# Patient Record
Sex: FEMALE | Race: WHITE | Employment: UNEMPLOYED | URBAN - METROPOLITAN AREA
[De-identification: names, ages, dates, MRNs, and addresses within clinical notes are randomized per-mention and may not be internally consistent; named-entity substitution may affect disease eponyms.]

---

## 2017-03-10 ENCOUNTER — OFFICE VISIT (OUTPATIENT)
Dept: FAMILY MEDICINE CLINIC | Age: 47
End: 2017-03-10

## 2017-03-10 VITALS
WEIGHT: 145 LBS | SYSTOLIC BLOOD PRESSURE: 127 MMHG | TEMPERATURE: 98 F | HEIGHT: 68 IN | BODY MASS INDEX: 21.98 KG/M2 | DIASTOLIC BLOOD PRESSURE: 85 MMHG | RESPIRATION RATE: 10 BRPM | HEART RATE: 74 BPM

## 2017-03-10 DIAGNOSIS — K90.0 CELIAC DISEASE: ICD-10-CM

## 2017-03-10 DIAGNOSIS — E03.4 HYPOTHYROIDISM DUE TO ACQUIRED ATROPHY OF THYROID: ICD-10-CM

## 2017-03-10 DIAGNOSIS — Z00.00 PREVENTATIVE HEALTH CARE: Primary | ICD-10-CM

## 2017-03-10 PROCEDURE — 99396 PREV VISIT EST AGE 40-64: CPT | Performed by: FAMILY MEDICINE

## 2017-03-10 RX ORDER — TAZAROTENE 0.1 MG/G
CREAM CUTANEOUS
COMMUNITY
Start: 2016-12-14

## 2017-04-03 LAB
CHOLESTEROL, TOTAL: 195 MG/DL
CHOLESTEROL/HDL RATIO: ABNORMAL
HBA1C MFR BLD: 5.1 %
HDLC SERPL-MCNC: 89 MG/DL (ref 35–70)
LDL CHOLESTEROL CALCULATED: 93 MG/DL (ref 0–160)
TRIGL SERPL-MCNC: 67 MG/DL
VLDLC SERPL CALC-MCNC: ABNORMAL MG/DL

## 2017-07-25 ENCOUNTER — OFFICE VISIT (OUTPATIENT)
Dept: FAMILY MEDICINE CLINIC | Age: 47
End: 2017-07-25
Payer: COMMERCIAL

## 2017-07-25 VITALS
TEMPERATURE: 97.3 F | HEIGHT: 69 IN | SYSTOLIC BLOOD PRESSURE: 115 MMHG | BODY MASS INDEX: 22.01 KG/M2 | DIASTOLIC BLOOD PRESSURE: 72 MMHG | RESPIRATION RATE: 16 BRPM | HEART RATE: 67 BPM | WEIGHT: 148.6 LBS

## 2017-07-25 DIAGNOSIS — K90.0 CELIAC DISEASE: ICD-10-CM

## 2017-07-25 DIAGNOSIS — E03.4 HYPOTHYROIDISM DUE TO ACQUIRED ATROPHY OF THYROID: ICD-10-CM

## 2017-07-25 DIAGNOSIS — Z00.00 PREVENTATIVE HEALTH CARE: ICD-10-CM

## 2017-07-25 PROCEDURE — 99214 OFFICE O/P EST MOD 30 MIN: CPT | Performed by: FAMILY MEDICINE

## 2017-07-25 RX ORDER — ASCORBIC ACID 125 MG
1 TABLET,CHEWABLE ORAL 2 TIMES DAILY
COMMUNITY

## 2017-07-25 RX ORDER — MULTIVIT WITH MINERALS/LUTEIN
1000 TABLET ORAL 4 TIMES DAILY
COMMUNITY

## 2017-07-25 ASSESSMENT — PATIENT HEALTH QUESTIONNAIRE - PHQ9
2. FEELING DOWN, DEPRESSED OR HOPELESS: 0
SUM OF ALL RESPONSES TO PHQ QUESTIONS 1-9: 0
1. LITTLE INTEREST OR PLEASURE IN DOING THINGS: 0
SUM OF ALL RESPONSES TO PHQ9 QUESTIONS 1 & 2: 0

## 2018-01-21 LAB
AVERAGE GLUCOSE: NORMAL
CHOLESTEROL, TOTAL: 202 MG/DL
CHOLESTEROL/HDL RATIO: ABNORMAL
HBA1C MFR BLD: 5.1 %
HDLC SERPL-MCNC: 85 MG/DL (ref 35–70)
LDL CHOLESTEROL CALCULATED: 103 MG/DL (ref 0–160)
TRIGL SERPL-MCNC: 71 MG/DL
VLDLC SERPL CALC-MCNC: ABNORMAL MG/DL

## 2018-02-02 ENCOUNTER — OFFICE VISIT (OUTPATIENT)
Dept: FAMILY MEDICINE CLINIC | Age: 48
End: 2018-02-02
Payer: COMMERCIAL

## 2018-02-02 VITALS
DIASTOLIC BLOOD PRESSURE: 76 MMHG | OXYGEN SATURATION: 92 % | SYSTOLIC BLOOD PRESSURE: 115 MMHG | TEMPERATURE: 97.5 F | BODY MASS INDEX: 20.88 KG/M2 | RESPIRATION RATE: 10 BRPM | HEIGHT: 69 IN | WEIGHT: 141 LBS | HEART RATE: 73 BPM

## 2018-02-02 DIAGNOSIS — K90.0 CELIAC DISEASE: ICD-10-CM

## 2018-02-02 DIAGNOSIS — Z00.00 PREVENTATIVE HEALTH CARE: ICD-10-CM

## 2018-02-02 DIAGNOSIS — E78.9 LIPID DISORDER: Primary | ICD-10-CM

## 2018-02-02 DIAGNOSIS — E06.3 HYPOTHYROIDISM DUE TO HASHIMOTO'S THYROIDITIS: ICD-10-CM

## 2018-02-02 DIAGNOSIS — E03.8 HYPOTHYROIDISM DUE TO HASHIMOTO'S THYROIDITIS: ICD-10-CM

## 2018-02-02 PROCEDURE — G8420 CALC BMI NORM PARAMETERS: HCPCS | Performed by: FAMILY MEDICINE

## 2018-02-02 PROCEDURE — G8427 DOCREV CUR MEDS BY ELIG CLIN: HCPCS | Performed by: FAMILY MEDICINE

## 2018-02-02 PROCEDURE — G8484 FLU IMMUNIZE NO ADMIN: HCPCS | Performed by: FAMILY MEDICINE

## 2018-02-02 PROCEDURE — 1036F TOBACCO NON-USER: CPT | Performed by: FAMILY MEDICINE

## 2018-02-02 PROCEDURE — 99214 OFFICE O/P EST MOD 30 MIN: CPT | Performed by: FAMILY MEDICINE

## 2018-02-02 NOTE — PROGRESS NOTES
Subjective:      Patient ID: Blanche Snowden is a 56 y.o. female. HPI   Blanche Snowdne is a 56 y.o. White female. Kristy Dandy  presents to the [address] [facility] today for   Chief Complaint   Patient presents with   [address]   ,  and;   Lipid disorder    Celiac disease    Hypothyroidism due to Hashimoto's thyroiditis    Preventative health care      I have reviewed Blanche Snowden medical, surgical and other pertinent history in detail, and have updated medication and allergy information in the computerized patient record. Clinical Care Team:     -Referring Provider for today's consult: Self Referred  -Primary Care Provider: Nate Lorenzo MD    Medical/Surgical History:   She  has a past medical history of Celiac disease and Hypothyroidism. Her  has a past surgical history that includes Upper gastrointestinal endoscopy (November 2013); Colonoscopy (July 2013); Appendectomy (2000); Anus surgery (2000); and other surgical history. Family/Social History:     Her family history includes High Cholesterol in her father. She  reports that she has never smoked. She has never used smokeless tobacco. She reports that she does not drink alcohol or use drugs.     Medications/Allergies/Immunizations:     Her current medication(s) include   Current Outpatient Prescriptions:     LCVBHQUEF-GEFIPSEPJ-QHRPDNGCJI PO, Take 1 tablet by mouth every morning (before breakfast) [facility], Disp: , Rfl:     Lysine 500 MG TABS, Take 500 mg by mouth 4 times daily, Disp: , Rfl:     Ascorbic Acid (VITAMIN C) 1000 MG tablet, Take 1,000 mg by mouth 2 times daily, Disp: , Rfl:     Menaquinone-7 (VITAMIN K2) 100 MCG CAPS, Take 1 capsule by mouth 2 times daily, Disp: , Rfl:     TAZORAC 0.1 % cream, Apply as needed nightly, Disp: , Rfl:     NONFORMULARY, Progesterone compounded capsule- 1 capsule daily on last half of cycle, Disp: , Rfl:     Specialty Vitamins Products (MAGNESIUM, AMINO ACID CHELATE,) 133 MG tablet, Take 1 tablet by mouth 4 times daily. , Disp: , Rfl:     ferrous sulfate 325 (65 FE) MG tablet, Take 325 mg by mouth nightly., Disp: , Rfl:     Omega-3 1400 MG CAPS, Take 1,250 mg by mouth 6 times daily [facility]S  Or NOW1 before and as finish meals and at bedtime, Disp: , Rfl:     Cinnamon 500 MG CAPS, Take 1 capsule by mouth 4 times daily (before meals and nightly). , Disp: , Rfl:     Vitamin D (CHOLECALCIFEROL) 1000 UNITS CAPS capsule, Take 6,000 Units by mouth daily , Disp: , Rfl:     Magnesium Citrate 100 MG TABS, Take 1 tablet by mouth 3 times daily (with meals) , Disp: , Rfl:     THYROID PO, Compound Thyroid 40 mcg/12 mcg Take 1 capsule by mouth daily, Disp: , Rfl:     Multiple Vitamins-Minerals (THERAPEUTIC MULTIVITAMIN-MINERALS) tablet, Take 1 tablet by mouth 2 times daily. , Disp: , Rfl:     DHEA 10 MG TABS, Take 15 mg by mouth daily Alternate 10 mg and 20 mg, Disp: , Rfl:     QUERCETIN PO, Take 1 capsule by mouth daily as needed (allergies). , Disp: , Rfl:     B Complex Vitamins (VITAMIN B COMPLEX) TABS, Take 1 tablet by mouth 3 times daily (before meals) [facility], [facility]vs or natures made, Disp: , Rfl:   Allergies: Review of patient's allergies indicates no known allergies. ,  Immunizations: There is no immunization history on file for this patient. History of Present Illness:     Irina's had concerns including Discuss Labs. Juan R Spivey  presents to the [facility] [address] [place] today for;   Chief Complaint   Patient presents with   [address] [place] [address]   , ,  abnormal labs follow up and these conditions as she  Is looking today for:     Lipid disorder    Celiac disease    Hypothyroidism due to Hashimoto's thyroiditis    Preventative health care          Review of Systems   All other systems reviewed and are negative. Objective:   Physical Exam   Constitutional: She is oriented to person, place, and time. She appears well-developed and well-nourished.    HENT:   Head: Normocephalic. Pulmonary/Chest: Effort normal.   Neurological: She is alert and oriented to person, place, and time. Psychiatric: She has a normal mood and affect. Thought content normal.   Nursing note and vitals reviewed. Assessment:     Laboratory Data:   Lab results were searched in Care Everywhere and/or those brought by the pateint were reviewed today with Alma Hernandez and she has a copy of their most recent labs to take home with them as noted below;       Imaging Data:   Imaging Data:       Assessment & Plan:       Impression:  1. Lipid disorder    2. Celiac disease    3. Hypothyroidism due to Hashimoto's thyroiditis    4. Preventative health care      Assessment and Plan:  After reviewing the patients chief complaints, reviewing their lab findings in great detail (with the patient and those accompanying them) which correlate to their chief complaints, symptoms, and or medical conditions; suggestions were made relating to changes in diet and or supplements which may improve the complaints and which will be reflected in their future lab findings; Chief Complaint   Patient presents with   [address]   ;    Plans for the next visits:  - Abnormal and non-optimal Labs were ordered today to be repeated in the next 120-365 days to assess changes from adjustments in nutrition and or nutrients. - Patient instructed when having a blood draw to ask the  to divide their lab draws into multiple draws over several days if not feeling good at the time of the lab draw or if either prefers to do several smaller blood draws over several days  - Patient instructed to check with insurer before each lab draw and to go to the lab which the insurer directs them for the most cost effective lab draw with the least patient's cost  - Alma Hernandez  will be scheduled subsequent to those results.   Jay Dawn will bring in her drink and food log to her next visit    Chronic Problems Addressed on this Visit: or weight issues. I discussed that the patient's clinical use of cinnamon bark, calcium, magnesium, Vitamin D and pharmaceutical grade CVS #[id number] fish oil or triple-strength fish oil, and balanced B-50 or B-100 time-released B complex which does not contain Vit C in the tablet. The dose will be for a time-limited trial to determine their individual effectiveness and tolerance in this patient. I also referred the patient to the reviewing such items as consumerlabs. com NMCD: Nutrition, Metabolism, and Cardiovascular Diseases (journal) and NCAAM.gov,  Searching www.nih.gov for any concerns about long-term use and hepatotoxicity of cinnamon and other nutrients and suggest they frequently search [employer].gov for the latest non-proprietary information on nutriceuticals as well as consider a subscription to [employer] for details on reviewed supplements, or at the least review the nutrient files at [facility], [address], an insulin mimetic, reduces some High Carbohydrate Dietary Impacts. Methylhydroxychalcone polymers insulin-enhancing properties in fat cells are responsible for enhanced glucose uptake, inhibiting hepatic HMG-CoA reductase and lowers lipids. www.jacn. org/content/20/4/327.full     But cinnamon with additives such as Cinnamon Extract are not effective as insulin mimetics. https://www.arroyo.net/     Nutrients for Start up from [employer] or [employer] for ease to get started now ;  Keyur Desai has some useable products;  - Triple Strength Fish Oil, enteric coated  - Vit D 3 5000 IU gel caps  - Iron ferrous sulfat 325 mg tabs  - Centrum Silver look-a-like for most patients not needing iron, or  - Centrum plain look-a-like if need iron    Local pharmacy chains such as [facility], [address], [facility], [address].  Beatris [place];  - Triple Strength Fish Oil (enteric coated if available) or    If not enteric coated, can take from freezer for less burps  - B-50 or B-100 time released balanced B complex tabs not containing Vit C in tablet  - Cinnamon bark 500 mg (with Chromium but not extracts)   some brands list 1000 mg / serving of 2 500 mg capsules,    some brands have 1000 mg caps with the chromium but capsule size is huge  - Calcium carbonate/citrate, magnesium oxide/citrate, Vit D 3  as 3-4 tabs/caps/serving     Some Local Brands may contain Zinc which is acceptable for the first bottle or two  - Magnesium oxide 250 mg tabs for those having < 2 bowel movements daily  - Magnesium citrate TABLETS  or Slow Mag, or magnesium gluconate if having > 2 bowel movement/day  - Centrum Silver or look-a-like for most patients, Centrum plain or look-a-like with iron  - Vitamin D-3 comes as 1,000 IU or 2,000 IU or 5,000 IU gel caps or Liquid drops      Some brands containing or derived from soy oil or corn oil are OK if not allergic to soy  - Elemental Iron 65 mg tabs at bedtime is available over the counter if need more iron     Usually turns bowel movements grey, green or black but not a concern  - Apricot Kernel Oil (by Now) for dry skin and use in sensitive perineal area skin    Nutrients for ongoing use by Mail order for less expense from www.amazon. com, wwwWealthVisor.com, www.[employer] Logic   - Triple Strength Fish Oil , Softgels   - B-100 time released balanced B complex   - Cinnamon bark 500 mg with or without Chromium but not extract (ineffective)  - Calcium carbonate 1000 mg, Magnesium oxide 500 mg, Vit D 3 best as individual  - Magnesium oxide 250 mg, 400 mg, or 500 mg tabs if < 2 bowel movements daily  - Multivitamin Seniors for most patients, One Daily or Item with iron  - Vit D 3  1,000IU ,   2,000 IU,  5,000 IU, can start low and buy larger on 2nd bottle     Some brands containing or derived from soy oil or corn oil are OK if not allergic to soy    Nutrients for Special Needs by Mail order for less expense;  - Elemental Iron 65 mg tabs rash on your chin, cheeks and lines on your neck and chest. The amount of latex is different in each food product or fruit variety. Foods to Avoid out of Season if not grown locally: Melon, Nectarine, Papaya, Cherry, Passion fruit, Plum, Chestnuts, and Tomato. Avocado, Banana, Celery, Figs, and Kiwi always contain Latex-like protein and are almost universally toxic    Whats in Season? Strawberries taste better in June than December because June is strawberry season so buy locally grown produce \"in season\" for the best flavor, cost and less Latex. Locally grown produce not only tastes great requires little of no ethylene exposure in food distribution so has less latex content. Out of season, use canned, frozen or dried since processed ripe and are latex lower!!!   Month     [place] Locally Grown Produce  January, February, March: use canned, frozen or dried fruits since lower in latex  April; asparagus, radishes  May; asparagus, broccoli, green onions, greens, peas, radishes, rhubarb  June; asparagus, beets, beans, broccoli, cabbage, cantaloupe, carrots, green onions, greens, lettuce, onions, parsley, peas, radishes, rhubarb, strawberries, watermelons  July; beans, beets, blueberries, broccoli, cabbage, cantaloupe, carrots, cauliflower, celery, cucumbers, eggplant, grapes, green onions, greens, lettuce, onions, parsley, peas, peaches, bell peppers, potatoes, radishes, summer raspberries, squash, sweet corn, tomatoes, turnips, watermelons  August; apples, beans, beets, blueberries, cabbage, cantaloupe, carrots, cauliflower, celery, cucumbers, eggplant, grapes, green onions, greens, lettuce, onions, parsley, peas, peaches, pears, bell peppers, potatoes, radishes, squash, sweet corn, tomatoes, turnips, watermelons  September; apples, beans, beets, blueberries, cabbage, cantaloupe, carrots, cauliflower, celery, cucumbers, eggplant, grapes, green onions, greens, lettuce, onions, parsley, peas, peaches, pears, bell

## 2018-04-23 ENCOUNTER — TELEPHONE (OUTPATIENT)
Dept: FAMILY MEDICINE CLINIC | Age: 48
End: 2018-04-23

## 2019-01-15 LAB
AVERAGE GLUCOSE: NORMAL
CHOLESTEROL, TOTAL: 172 MG/DL
CHOLESTEROL/HDL RATIO: NORMAL
HBA1C MFR BLD: 5.3 %
HDLC SERPL-MCNC: 57 MG/DL (ref 35–70)
LDL CHOLESTEROL CALCULATED: 95 MG/DL (ref 0–160)
TRIGL SERPL-MCNC: 97 MG/DL
VLDLC SERPL CALC-MCNC: NORMAL MG/DL

## 2019-02-01 ENCOUNTER — OFFICE VISIT (OUTPATIENT)
Dept: FAMILY MEDICINE CLINIC | Age: 49
End: 2019-02-01
Payer: COMMERCIAL

## 2019-02-01 VITALS
SYSTOLIC BLOOD PRESSURE: 94 MMHG | DIASTOLIC BLOOD PRESSURE: 60 MMHG | HEIGHT: 69 IN | TEMPERATURE: 98.4 F | BODY MASS INDEX: 22.1 KG/M2 | WEIGHT: 149.2 LBS | RESPIRATION RATE: 10 BRPM

## 2019-02-01 DIAGNOSIS — E03.8 HYPOTHYROIDISM DUE TO HASHIMOTO'S THYROIDITIS: ICD-10-CM

## 2019-02-01 DIAGNOSIS — K90.0 CELIAC DISEASE: ICD-10-CM

## 2019-02-01 DIAGNOSIS — E78.9 LIPID DISORDER: ICD-10-CM

## 2019-02-01 DIAGNOSIS — E06.3 HYPOTHYROIDISM DUE TO HASHIMOTO'S THYROIDITIS: ICD-10-CM

## 2019-02-01 PROCEDURE — G8427 DOCREV CUR MEDS BY ELIG CLIN: HCPCS | Performed by: FAMILY MEDICINE

## 2019-02-01 PROCEDURE — G8420 CALC BMI NORM PARAMETERS: HCPCS | Performed by: FAMILY MEDICINE

## 2019-02-01 PROCEDURE — 1036F TOBACCO NON-USER: CPT | Performed by: FAMILY MEDICINE

## 2019-02-01 PROCEDURE — 99214 OFFICE O/P EST MOD 30 MIN: CPT | Performed by: FAMILY MEDICINE

## 2019-02-01 PROCEDURE — G8484 FLU IMMUNIZE NO ADMIN: HCPCS | Performed by: FAMILY MEDICINE

## 2019-02-01 ASSESSMENT — PATIENT HEALTH QUESTIONNAIRE - PHQ9
2. FEELING DOWN, DEPRESSED OR HOPELESS: 0
SUM OF ALL RESPONSES TO PHQ QUESTIONS 1-9: 0
1. LITTLE INTEREST OR PLEASURE IN DOING THINGS: 0
SUM OF ALL RESPONSES TO PHQ9 QUESTIONS 1 & 2: 0
SUM OF ALL RESPONSES TO PHQ QUESTIONS 1-9: 0

## 2019-08-20 ENCOUNTER — NURSE ONLY (OUTPATIENT)
Dept: LAB | Age: 49
End: 2019-08-20

## 2019-08-20 DIAGNOSIS — E78.9 LIPID DISORDER: ICD-10-CM

## 2019-08-20 DIAGNOSIS — K90.0 CELIAC DISEASE: ICD-10-CM

## 2019-08-20 DIAGNOSIS — E03.8 HYPOTHYROIDISM DUE TO HASHIMOTO'S THYROIDITIS: ICD-10-CM

## 2019-08-20 DIAGNOSIS — E06.3 HYPOTHYROIDISM DUE TO HASHIMOTO'S THYROIDITIS: ICD-10-CM

## 2019-08-20 LAB
AVERAGE GLUCOSE: 93 MG/DL (ref 70–126)
BASOPHILS # BLD: 0.5 %
BASOPHILS ABSOLUTE: 0 THOU/MM3 (ref 0–0.1)
CALCIUM SERPL-MCNC: 9.6 MG/DL (ref 8.5–10.5)
CHOLESTEROL, TOTAL: 172 MG/DL (ref 100–199)
EOSINOPHIL # BLD: 0.8 %
EOSINOPHILS ABSOLUTE: 0.1 THOU/MM3 (ref 0–0.4)
ERYTHROCYTE [DISTWIDTH] IN BLOOD BY AUTOMATED COUNT: 14 % (ref 11.5–14.5)
ERYTHROCYTE [DISTWIDTH] IN BLOOD BY AUTOMATED COUNT: 46.3 FL (ref 35–45)
ESTRADIOL LEVEL: 29.8 PG/ML
HBA1C MFR BLD: 5.1 % (ref 4.4–6.4)
HCT VFR BLD CALC: 39.7 % (ref 37–47)
HDLC SERPL-MCNC: 76 MG/DL
HEMOGLOBIN: 12.6 GM/DL (ref 12–16)
IMMATURE GRANS (ABS): 0.02 THOU/MM3 (ref 0–0.07)
IMMATURE GRANULOCYTES: 0 %
LDL CHOLESTEROL CALCULATED: 87 MG/DL
LYMPHOCYTES # BLD: 30.8 %
LYMPHOCYTES ABSOLUTE: 2 THOU/MM3 (ref 1–4.8)
MAGNESIUM: 2.1 MG/DL (ref 1.6–2.4)
MCH RBC QN AUTO: 28.7 PG (ref 26–33)
MCHC RBC AUTO-ENTMCNC: 31.7 GM/DL (ref 32.2–35.5)
MCV RBC AUTO: 90.4 FL (ref 81–99)
MONOCYTES # BLD: 5.7 %
MONOCYTES ABSOLUTE: 0.4 THOU/MM3 (ref 0.4–1.3)
NUCLEATED RED BLOOD CELLS: 0 /100 WBC
PLATELET # BLD: 251 THOU/MM3 (ref 130–400)
PMV BLD AUTO: 9.7 FL (ref 9.4–12.4)
PROGESTERONE LEVEL: 0.35 NG/ML
PTH INTACT: 45.5 PG/ML (ref 15–65)
RBC # BLD: 4.39 MILL/MM3 (ref 4.2–5.4)
RHEUMATOID FACTOR: 10 IU/ML (ref 0–13)
SEDIMENTATION RATE, ERYTHROCYTE: 4 MM/HR (ref 0–20)
SEG NEUTROPHILS: 61.9 %
SEGMENTED NEUTROPHILS ABSOLUTE COUNT: 4 THOU/MM3 (ref 1.8–7.7)
T3 TOTAL: 93 NG/DL (ref 72–181)
TRIGL SERPL-MCNC: 44 MG/DL (ref 0–199)
TSH SERPL DL<=0.05 MIU/L-ACNC: 1.73 UIU/ML (ref 0.4–4.2)
VITAMIN D 25-HYDROXY: 74 NG/ML (ref 30–100)
WBC # BLD: 6.5 THOU/MM3 (ref 4.8–10.8)

## 2019-08-21 LAB — THYROXINE (T4): 6.6 UG/DL (ref 4.5–12)

## 2019-08-23 LAB
ANA SCREEN: NORMAL
C-REACTIVE PROTEIN, HIGH SENSITIVITY: 1.2 MG/L
DHEAS (DHEA SULFATE): 354 UG/DL (ref 32–240)
GLIADIN PEPTIDE IGG, IGA: NORMAL
HOMOCYSTEINE, TOTAL: 7 UMOL/L
THYROID PEROXIDASE ANTIBODY: 2.2 IU/ML (ref 0–9)

## 2019-08-25 LAB
DHEA UNCONJUGATED: 5.85 NG/ML (ref 0.63–4.7)
TESTOSTERONE, FREE W SHGB, FEMALES/CHILDREN: NORMAL

## 2019-09-03 ENCOUNTER — OFFICE VISIT (OUTPATIENT)
Dept: FAMILY MEDICINE CLINIC | Age: 49
End: 2019-09-03
Payer: COMMERCIAL

## 2019-09-03 VITALS
HEIGHT: 69 IN | TEMPERATURE: 98.7 F | HEART RATE: 61 BPM | RESPIRATION RATE: 10 BRPM | WEIGHT: 146.6 LBS | BODY MASS INDEX: 21.71 KG/M2 | SYSTOLIC BLOOD PRESSURE: 122 MMHG | DIASTOLIC BLOOD PRESSURE: 80 MMHG | OXYGEN SATURATION: 99 %

## 2019-09-03 DIAGNOSIS — J45.20 MILD INTERMITTENT ASTHMA WITHOUT COMPLICATION: ICD-10-CM

## 2019-09-03 DIAGNOSIS — E03.8 HYPOTHYROIDISM DUE TO HASHIMOTO'S THYROIDITIS: ICD-10-CM

## 2019-09-03 DIAGNOSIS — E78.9 LIPID DISORDER: ICD-10-CM

## 2019-09-03 DIAGNOSIS — E06.3 HYPOTHYROIDISM DUE TO HASHIMOTO'S THYROIDITIS: ICD-10-CM

## 2019-09-03 DIAGNOSIS — F41.9 ANXIETY: Primary | ICD-10-CM

## 2019-09-03 DIAGNOSIS — K90.0 CELIAC DISEASE: ICD-10-CM

## 2019-09-03 DIAGNOSIS — R53.83 TIRED: ICD-10-CM

## 2019-09-03 DIAGNOSIS — R41.3 MEMORY CHANGE: ICD-10-CM

## 2019-09-03 PROCEDURE — 99214 OFFICE O/P EST MOD 30 MIN: CPT | Performed by: FAMILY MEDICINE

## 2019-09-03 PROCEDURE — 1036F TOBACCO NON-USER: CPT | Performed by: FAMILY MEDICINE

## 2019-09-03 PROCEDURE — G8420 CALC BMI NORM PARAMETERS: HCPCS | Performed by: FAMILY MEDICINE

## 2019-09-03 PROCEDURE — G8427 DOCREV CUR MEDS BY ELIG CLIN: HCPCS | Performed by: FAMILY MEDICINE

## 2019-09-03 RX ORDER — CLINDAMYCIN PHOSPHATE 11.9 MG/ML
SOLUTION TOPICAL PRN
Refills: 3 | COMMUNITY
Start: 2019-06-04

## 2019-09-03 NOTE — PROGRESS NOTES
[address] [facility]. [address] [place] [address]  Dept: [phone]  Dept Fax: [phone]  Loc: [phone]      Danisha Merrill is a 53 y.o. White female. Jackeline Perry  presents to the [facility] today for   Chief Complaint   Patient presents with    6 Month Follow-Up    Discuss Labs   ,  and;   2. Mild intermittent asthma without complication    3. Hypothyroidism due to Hashimoto's thyroiditis    4. Celiac disease    5. Lipid disorder      I have reviewed Danisha Merrill medical, surgical and other pertinent history in detail, and have updated medication and allergy information in the computerized patientrecord. Clinical Care Team:     -Referring Provider for today's consult: Self Referred  -Primary Care Provider: Jason Gaona DO    Medical/Surgical History:   She  has a past medical history of Celiac disease and Hypothyroidism. Her  has a past surgical history that includes Upper gastrointestinal endoscopy (November 2013); Colonoscopy (July 2013); Appendectomy (2000); Anus surgery (2000); and other surgical history. Family/Social History:     Her family history includes High Cholesterol in her father. She  reports that she has never smoked. She has never used smokeless tobacco. She reports that she does not drink alcohol or use drugs.     Medications/Allergies/Immunizations:     Her current medication(s) include   Current Outpatient Medications:     clindamycin (CLEOCIN T) 1 % external solution, Apply topically as needed, Disp: , Rfl: 3    Lysine 500 MG TABS, Take 500 mg by mouth 4 times daily, Disp: , Rfl:     Ascorbic Acid (VITAMIN C) 1000 MG tablet, Take 1,000 mg by mouth 4 times daily , Disp: , Rfl:     Menaquinone-7 (VITAMIN K2) 100 MCG CAPS, Take 1 capsule by mouth 2 times daily, Disp: , Rfl:     TAZORAC 0.1 % cream, Apply as needed nightly, Disp: , Rfl:     NONFORMULARY, Wt 146 lb 9.6 oz (66.5 kg)   SpO2 99%   BMI 21.96 kg/m²   Physical Exam   Constitutional: She is oriented to person, place, and time. She appears well-developed and well-nourished. HENT:   Head: Normocephalic. Pulmonary/Chest: Effort normal.   Neurological: She is alert and oriented to person, place, and time. Psychiatric: She has a normal mood and affect. Thought content normal.   Nursing note and vitals reviewed. Laboratory Data:   Lab results were searched in Care Everywhere and/or those brought by the pateint were reviewed today with Schuyler Mckinnon and she has a copy of their most recent labs to take home with them as notedbelow;       Imaging Data:   Imaging Data:       Assessment & Plan:       Impression:  1. Anxiety    2. Mild intermittent asthma without complication    3. Hypothyroidism due to Hashimoto's thyroiditis    4. Celiac disease    5. Lipid disorder      Assessment and Plan:  After reviewing the patients chief complaints, reviewing their labfindings in great detail (with the patient and those accompanying them) which correlate to their chief complaints, symptoms, and or medical conditions; suggestions were made relating to changes in diet and or supplementswhich may improve the complaints and which will be reflected in their future lab findings; Chief Complaint   Patient presents with    6 Month Follow-Up    Discuss Labs   ;    Plans for the next visits:  - Abnormal and non-optimal Labs were ordered today to be repeated in the next 120-365 days to assess changes from adjustments in nutrition and or nutrients.    - Patient instructed when having ablood draw to ask the  to divide their lab draws into multiple draws over several days if not feeling good at the time of the lab draw or if either prefers to do several smaller blood draws over several days  -Patient instructed to check with insurer before each lab draw and to to to the lab which the insurer directs them for the most discussed that thepatient's clinical use of cinnamon bark, calcium, magnesium, Vitamin D and pharmaceutical grade CVS #288244 fish oil or triple-strength fish oil, and B-75 two phase time-released B complex by Celi Ramos will be for atime-limited trial to determine their individual effectiveness and safety in this patient. I also referred the patient to the NMCD: Nutrition, Metabolism, and Cardiovascular Diseases (journal) and concerns about long-termuse and hepatotoxicity of cinnamon and other nutrients and suggest they frequently search nih.gov for the latest non-proprietary information on nutriceuticals as well as consider a subscription to [employer] fordetails on reviewed supplements, or at the least review the nutrient files at [address] at [facility], [employer], an insulin mimetic, reduces some High Carbohydrate Dietary Impacts. Methylhydroxychalcone polymers insulin-enhancing properties in fat cells are responsible for enhanced glucose uptake, inhibiting hepatic HMG-CoA reductase and lowers lipids. www.jacn. org/content/20/4/327.full     But cinnamon with additivessuch as Chromium or Cinnamon Extract are not effective as insulin mimetics.  https://www.arroyo.net/     Nutrients for Start up from [employer] or [employer] for ease to get started now ;  Keyur Desai has some useable products;  - Triple Strength Fish Oil, enteric coated  - Vit D 3 5000 IU gel caps  - Iron ferrous sulfat 325 mg tabs  - Centrum Silver look-a-like for most patients, or  - Centrum plain look-a-like if need iron    Localpharmacies or chains such as [facility], [facility], [facility], have;  - Triple Strength Fish Oil (enteric coated ifavailable) or    If not enteric coated, can take from freezer for less burps  - B-50 or B-100 time released balanced B complex tabs  - Cinnamon bark 500 mg (without Chromium or extracts)   some brands list 1000 mg / serving of 2 DHEA levels on labs if having Fatigue    If stools too loose substitute for your Magnesium oxide using;   Magnesium citrate 200 mg tabs(NOT liquid) at bMobilized   Magnesium gluconate 550 mgby Dylon at [employer]. com or amazon. com  Magnesium chloride foot soaks or body sprays  www.Aden [employer] Anais   Magnesium chloride flakes 14.99 Item #: [id number] if Backordered get spray    Food Drink Symptom Log;  I asked this patient to track these items and any other symptoms on their list on a weekly basis to documenttheir progress or lack of same. This can be done on the symptom tracking sheet I gave them at today's visit but looks like this:                                                      Rate on scale of 0-10 with zero = notnoticeable  Symptom:                            Week 1               2                 3                 4               Etc            Hair loss    Foot cramps    Paresthesia    Aches    IBS (irritable bowel)    Constipation    Diarrhea  Nocturia    (up to bathroom at night)    Fatigue/Energy level  Stress      On the other side of the sheet they can track their food, drink, environment, activity, symptoms etc      Avoiding Latex-like proteins inmy foods; Avocados, Bananas, Celery, Figs & Kiwi proteins have latex-like proteins to inflame our immunesystems  How Can I Have A Latex Allergy? Eating foods with latex-like protein exposes us to latex allergies. Our body cannot tell the differencebetween these latex-like proteins and latex from rubber products since many people are allergic to fruit, vegetables and latex. Read labels on pre-packaged foods. This list to avoid is only a guide if you are known allergicto latex or have a latex rash on your chin, cheeks and lines on your neck and chest. The amount of latex is different in each food product or fruit variety. Foods to Avoid out of Season if not grown locally: Melon, Nectarine, Papaya, Cherry, Passion fruit, Plum, Chestnuts, and Tomato.

## 2020-03-06 ENCOUNTER — NURSE ONLY (OUTPATIENT)
Dept: LAB | Age: 50
End: 2020-03-06

## 2020-03-06 LAB
CALCIUM SERPL-MCNC: 9.8 MG/DL (ref 8.5–10.5)
MAGNESIUM: 2.5 MG/DL (ref 1.6–2.4)
PTH INTACT: 46.6 PG/ML (ref 15–65)
VITAMIN D 25-HYDROXY: 44 NG/ML (ref 30–100)

## 2020-03-08 LAB
EPSTEIN-BARR VIRUS ANTIBODIES: NORMAL
LYME ANTIBODY: 0.58 LIV (ref 0–1.2)

## 2020-04-03 ENCOUNTER — TELEPHONE (OUTPATIENT)
Dept: FAMILY MEDICINE CLINIC | Age: 50
End: 2020-04-03

## 2020-04-08 ENCOUNTER — VIRTUAL VISIT (OUTPATIENT)
Dept: FAMILY MEDICINE CLINIC | Age: 50
End: 2020-04-08
Payer: COMMERCIAL

## 2020-04-08 PROCEDURE — 99214 OFFICE O/P EST MOD 30 MIN: CPT | Performed by: FAMILY MEDICINE

## 2020-04-08 PROCEDURE — G8428 CUR MEDS NOT DOCUMENT: HCPCS | Performed by: FAMILY MEDICINE

## 2020-04-08 RX ORDER — ACETAMINOPHEN 160 MG
1 TABLET,DISINTEGRATING ORAL DAILY
COMMUNITY

## 2020-04-08 ASSESSMENT — ENCOUNTER SYMPTOMS: APNEA: 1

## 2020-04-08 NOTE — PROGRESS NOTES
draw with the least patient's cost  - [facility]  will be scheduled subsequentto those results. Jj Thompson will bring in her drink and food log to her next visit    Chronic Problems Addressed on this Visit:                                   1.  Intensity of Service; Uncontrolled items at this visit; Chief Complaint   Patient presents with   [address]   ; Improved items at this visit; Stable items atthis visit;  2. Patients food and drinks were reviewed with the patient,       - [facility] will bring food+drink symptom log to next visit for inclusion in their record      - 75 better food list reviewed & given topatient with the omega 6 food list to avoid         - Gluten in corn and oats abstracts sheet reviewed and given to the patient today   3. Greater than 25 GT doxy. me video visit minutes were spent face to face on this visit of which >50% was for counseling and coordination of care. Patients food and drinks were reviewed with thepatient,   - they will bring a food drink symptom log to future visits for inclusion in their record    - 75 better food list reviewed & given to patient along with the omega 6 food list to avoid      - Glutenin corn and oats abstracts sheet reviewed and given to the patient today    - 23 Foods containing Latex-like proteins was reviewed and copy to be taken if desired     - Nutrient Supplements list provided and copyto be taken if desired    - [employer]. com web site offered to patient to review at their convenience by staff with login information    Note:  I have discussed with the patient that with all nutraceuticals, there is often mixed data and emerging research which needs to be monitored; as well as an array of NIHfact sheets on nutrients and supplements. If I have recommended cinnamon at the request of this patient to assist them in control of their blood sugar, triglyceride and or weight issues.   I discussed that with the undesireable chromium / extract  - Calcium carbonate/citrate, magnesium oxide/citrate, Vit D 3  as 3-4 tabs/caps/serving     Some Local Brands may contain Zincwhich is acceptable for the first bottle or two  - Magnesium oxide 250 mg tabs for those having < 2 bowel movements daily  - Magnesium citrate 200 mg if having > 2bowel movement/day  - Centrum Silver or look-a-like for most patients, Centrum plain or look-a-like with iron  - Vitamin D-3 comes as 1,000 IU or 2,000 IU or 5,000 IU gel caps or Liquid drops      Some brands containing or derived from soy oil or corn oil are OK if not allergic to soy  - Elemental Iron 65 mg tabsat bedtime is available over the counter if need more iron     Usually turns bowel movements grey, green or black but not a concern  - Apricot Kernel Oil (by Now) for dry skin sensitive perineal or perianal area skin    Nutrients for ongoing use by Mail order for less expense from www. [employer] ;  - Strength Fish Oil , 240 Softgels Item #456936  -B-100 time released balanced B complex Item #453325  - Cinnamon bark 500 mg without Chromium or extract Item #223019  - Calcium carbonate 1000 mg, Magnesium oxide 500 mg, Vit D 3  400 IU Item #697051  - Magnesium oxide 500 mg tabs Item #003925 if less than 2 bowel movements daily  - [employer] Seniors Item #008510 for mostpatients, One Daily Item #175013 with iron  - Vit D 3  1,000 Item #974871      2,000 IU Item #598898  ,000 IU Item #879319     Some brands containing orderived from soy oil or corn oil are OK if not allergic to soy    Nutrients for Special Needs by Lidia Albarado for less expense from www. puritan.com ;  -Elemental Iron 65 mg tabs Item #154521 if need more iron for low iron on labs    Usually turns bowel movements grey, green or black but not a concern  - Time released Niacin 250 mg Item #571772 for cold intolerance, low libido or impotence  - DHEA 50 mg Item #104966 for improving DHEA levels on labs if having Fatigue    If stools too Latex-like protein. Whats in Season? Strawberries taste better in June than December because June is strawberry season so buy locally grown produce \"in season\" for the best flavor, cost and less Latex. Locally grown produce notonly tastes great requires little of no ethylene exposure in food distribution so has less latex content. Out of season, use canned, frozen or dried sinceprocessed ripe and are latex lower!!!   Month     [place] LocallyGrown Produce  January, February, March: use canned, frozen or dried fruits since lower in latex  April; asparagus, radishes  May; asparagus, broccoli, green onions, greens, peas, radishes,rhubarb  June; asparagus, beets, beans, broccoli, cabbage, cantaloupe, carrots, green onions, greens, lettuce,onions, parsley, peas, radishes, rhubarb, strawberries, watermelons  July; beans, beets, blueberries,broccoli, cabbage, cantaloupe, carrots, cauliflower, celery, cucumbers, eggplant, grapes, green onions, greens, lettuce, onions, parsley, peas, peaches, bell peppers, potatoes, radishes, summer raspberries, squash, sweetcorn, tomatoes, turnips, watermelons  August; apples, beans, beets, blueberries, cabbage, cantaloupe, carrots,cauliflower, celery, cucumbers, eggplant, grapes, green onions, greens, lettuce, onions, parsley, peas, peaches, pears, bell peppers, potatoes, radishes, squash, sweet corn, tomatoes, turnips, watermelons  September; apples, beans, beets, blueberries, cabbage, cantaloupe, carrots, cauliflower, celery, cucumbers, eggplant, grapes,green onions, greens, lettuce, onions, parsley, peas, peaches, pears, bell peppers, plums, potatoes, pumpkins, radishes, fall red raspberries, squash, sweet corn, tomatoes, turnips, watermelons  October; apples, beets, broccoli, cabbage, carrots, cauliflower, celery, green onions, greens, lettuce, parsley, peas, pears, potatoes,pumpkins, radishes, fall red raspberries, squash, turnips  November; broccoli, cabbage, carrots, parsley,pears, peas  December: use canned, frozen ordried fruits since lower in latex    Upto half of latex-sensitive patients show allergic reactions to fruits (avocados, bananas, kiwifruits, papayas, peaches),   Annals of Allergy, 1994. These plants contain the same proteins that are allergens in latex. People with fruit allergies should warn physicians beforeundergoing procedures which may cause anaphylactic reaction if in contact with latex gloves. Some of the common foods with defined cross-reactivity to latexare avocado, banana, kiwi, chestnut, raw potato, tomato,stone fruits (e.g., peach, cherry), hazelnut, melons, celery, carrot, apple, pear, papaya, and almond. Foods with less well-defined cross-reactivity to latex are peanuts, peppers, citrus fruits, coconut, pineapple, jose alejandro,fig, passion fruit, Ugli fruit, and grape    This fruit/latex cross-reactivity is worsened by ethylene, a gas used to hasten commercial ripening. In nature, plants produce low levels of the hormone ethylene, which regulates germination, flowering, and ripening. Forced ripening by high ethyleneconcentrations, plants produce allergenic wound-repair proteins, which are similar to wound-repair proteins made during the tapping of rubber trees. Sensitive individualswho ingest the fruit get a higher dose and worse reaction. Some people may even first become sensitized to latex through fruit. Can food processing increase theconcentrations of allergenic proteins? Latex-sensitized children (and adults) in [place] often experience allergic reactions after eating bananas ripenedartificially with ethylene. In the [place], food distribution centers treat unripe bananas and other produce with ethylene to ripen; not commonly done in [facility] since fruit is tree-ripened there. Does treatmentof food with ethylene induce banana proteins that cross-react with latex?  (Abbie et al.    References:   Latex in Foods Allergy, http://ehp.niehs.nih.gov/members/2003/5811/5811.html    Search web for [employer] in Season \" for whereyou live or are at the time you food shop  www.nutritioncouncil.org/pdf/healthy/SeasonalProduce. pdf ,   Management of Latex, ://medicalcenter. [employer].edu/  search for latex

## 2020-11-20 ENCOUNTER — OFFICE VISIT (OUTPATIENT)
Dept: PSYCHOLOGY | Age: 50
End: 2020-11-20
Payer: COMMERCIAL

## 2020-11-20 PROCEDURE — 90791 PSYCH DIAGNOSTIC EVALUATION: CPT | Performed by: PSYCHOLOGIST

## 2020-11-24 NOTE — PROGRESS NOTES
Behavioral Health Consultation  Rivera Curry, PhD  Psychologist  11/24/2020  11:29 AM      Time spent with Patient:  60 minutes  [place]  [facility] [place] Reason for Consult:  anxiety and stress  Referring Provider: No referring provider defined for this encounter. Pt provided informed consent for the behavioral health program. Discussed with patient model of service to include the limits of confidentiality (i.e. abuse reporting, suicide intervention, etc.) and short-term intervention focused approach. Pt indicated understanding. Feedback given to PCP. Description:    MSE:    Appearance    cooperative, crying  Appetite normal  Sleep disturbance Yes  Loss of pleasure No  Speech    normal rate, normal volume and well articulated  Mood    Anxious  Depressed  Affect    anxiety  Thought Content    intact and helplessness  Insight    Good  Judgment    Intact  Suicide Assessment    no suicidal ideation  Homicidal Assessment Intent No  Cutting No  Enuresis No  Learning Disorder None      History:    Medications:   Current Outpatient Medications   Medication Sig Dispense Refill    Cholecalciferol (VITAMIN D3) 25 MCG (1000 UT) CAPS Take 1 capsule by mouth daily      clindamycin (CLEOCIN T) 1 % external solution Apply topically as needed  3    Lysine 500 MG TABS Take 500 mg by mouth 4 times daily      Ascorbic Acid (VITAMIN C) 1000 MG tablet Take 1,000 mg by mouth 4 times daily       Menaquinone-7 (VITAMIN K2) 100 MCG CAPS Take 1 capsule by mouth 2 times daily      TAZORAC 0.1 % cream Apply as needed nightly      NONFORMULARY Progesterone compounded capsule- 1 capsule daily on last half of cycle      Specialty Vitamins Products (MAGNESIUM, AMINO ACID CHELATE,) 133 MG tablet Take 1 tablet by mouth 4 times daily.  ferrous sulfate 325 (65 FE) MG tablet Take 325 mg by mouth nightly.       Omega-3 1400 MG CAPS Take 1,250 mg by mouth 6 times daily [facility]  Or NOW1 before and as finish meals and at bedtime      Cinnamon 500 MG CAPS Take 1 capsule by mouth 4 times daily (before meals and nightly).  Magnesium Citrate 100 MG TABS Take 3 tablets by mouth daily       THYROID PO Compound Thyroid 40 mcg/12 mcg Take 1 capsule by mouth daily      DHEA 10 MG TABS Take 15 mg by mouth daily Alternate 10 mg and 20 mg      QUERCETIN PO Take 1 capsule by mouth daily as needed (allergies).  B Complex Vitamins (VITAMIN B COMPLEX) TABS Take 1 tablet by mouth 3 times daily (before meals) [employer], or natures made       No current facility-administered medications for this visit.         Social History:   Social History     Socioeconomic History    Marital status:      Spouse name: Not on file    Number of children: Not on file    Years of education: Not on file    Highest education level: Not on file   Occupational History    Not on file   Social Needs    Financial resource strain: Not on file    Food insecurity     Worry: Not on file     Inability: Not on file    Transportation needs     Medical: Not on file     Non-medical: Not on file   Tobacco Use    Smoking status: Never Smoker    Smokeless tobacco: Never Used   Substance and Sexual Activity    Alcohol use: No    Drug use: No    Sexual activity: Not on file   Lifestyle    Physical activity     Days per week: Not on file     Minutes per session: Not on file    Stress: Not on file   Relationships    Social connections     Talks on phone: Not on file     Gets together: Not on file     Attends Pent[employer]ism service: Not on file     Active member of club or organization: Not on file     Attends meetings of clubs or organizations: Not on file     Relationship status: Not on file    Intimate partner violence     Fear of current or ex partner: Not on file     Emotionally abused: Not on file     Physically abused: Not on file     Forced sexual activity: Not on file   Other Topics Concern    Not on file   Social History Narrative    Not on file TOBACCO:   reports that she has never smoked. She has never used smokeless tobacco.  ETOH:   reports no history of alcohol use. Family History:   Family History   Problem Relation Age of Onset    High Cholesterol Father            Assessment:  She was seen for issues that are going on with her son; she is stressed out and worries about him; she saw this therapist at the other practice with her son for family counseling; her main stressor is her son and his anger; she takes thyroid medication, vitamin D, B, K; progesterone and Black Cohash which is a natural hot flash relief for menopause; She rated herself as good for sleep; eating was fine; motivation was fine; work was fine; relationships and feelings as needs improvement; since this therapist is seeing her son for therapy, this therapist talked to her about seeing someone else because of the conflict of interest; she is experiencing anxiety about her son and it is affecting her daily functioning. Diagnosis:    Adjustment Disorder with mixed anxiety and depressed mood. F43.26        Plan:  Pt interventions:  this therapist will talk to another therapist in the office and see if he can meet with her.

## 2021-03-01 ENCOUNTER — OFFICE VISIT (OUTPATIENT)
Dept: PSYCHOLOGY | Age: 51
End: 2021-03-01
Payer: COMMERCIAL

## 2021-03-01 DIAGNOSIS — F43.23 ADJUSTMENT DISORDER WITH MIXED ANXIETY AND DEPRESSED MOOD: Primary | ICD-10-CM

## 2021-03-01 PROCEDURE — 1036F TOBACCO NON-USER: CPT | Performed by: PSYCHOLOGIST

## 2021-03-01 PROCEDURE — 90791 PSYCH DIAGNOSTIC EVALUATION: CPT | Performed by: PSYCHOLOGIST

## 2021-03-01 NOTE — PATIENT INSTRUCTIONS
Try the worry worksheet below for 15 min per day. Mindfulness attitudes are also attached. Great meeting with you today! Talk to you again soon. Constructive Worry Worksheet  Concerns Solutions     1. ________________________                        2.  ______________________                        3.  _______________________     1. __________________________        2. __________________________        3. __________________________        1.  _________________________        2. __________________________        3. __________________________        1. __________________________        2. __________________________        3.  __________________________           Constructive Worry Instructions  When we have challenges, we tend to use our problem-solving skills to make our lives better and to relieve ourselves of anxiety. It is not surprising that some of us may use our problem-solving skills at the wrong time and place, namely bedtime. We may think about a problem, trying to solve it, but unfortunately this will oftentimes keep us awake. Constructive worry is a method for managing the tendency to worry during that quiet time when sleep is supposed to be taking over. Do this exercise early in the evening (at least 2 hours before bed). It should take only about 15 minutes to complete. Here is how it is done:  1. Write down the problem(s) facing you that has the greatest chance of keeping you awake a bedtime, and list them in the Concerns column of the [address]. 2. Then, think of the next step that might help fix it. Write it down in the Solutions column. This need not be the final solution to the problem, since most problems have to be solved by taking steps anyhow, and you will be doing this again tomorrow night and the night after until you finally get to the best solution.  If you know how to fix the problem completely, then write that down.     If you decide that this is not really a big problem, and you will just deal with it when the time comes, then write that down.  If you decide that you simply do not know what to do about it, and need to ask someone to help you, write that down.  If you decide that it is a problem, but there seems to be no good solution at all, and that you will just have to live with it, write that down, with a note to yourself that maybe sometime soon you or someone you speak with will give you a clue that will lead you to a solution. 3. Repeat this for any other concerns you have. 4. Fold the Constructive Worry Worksheet in half and place it on the nightstand next to your bed and forget about it until bedtime. 5. At bedtime, if you begin to worry actually tell yourself that you have dealt with your problems already in the best way you know how, and when you were at your problem-solving best.  Remind yourself that you will be working on them again tomorrow evening and that nothing you can do while you are so tired can help you any more than what you have already done; more effort will only make the matters worst.      Mindfulness Attitudes that provide foundation for healthier living  From the work of Debbie Romero, PhD, in Full Catastrophe Living    1. Non-Judging   Taking the stance of an impartial witness to your own experience.  Noticing the stream of judging mind . . good / bad / neutral not trying to  stop it but just being aware of it. 2. Patience   Letting things unfold in their own time   A child may try to help a butterfly emerge by breaking open a chrysalis but  chances are the butterfly wont benefit from this help.  Practising patience with ourselves. Why rush through some moments in  order to get to other better ones? Each one is your life in that moment. [de-identified]   Being completely open to each moment, accepting its fullness, knowing that  like the butterfly, things will emerge in their own time.    3. Beginners Mind   Too often we let our thinking and our beliefs about what we know stop us  from seeing things as they really are.  Cultivating a mind that is willing to see everything as if for the first time.  Being receptive to new possibilities not getting stuck in a rut of our own  expertise.  Each moment is unique and contains unique possibilities.  Try it with someone you know - next time, ask yourself if you are seeing  this person with fresh eyes, as he/she really is? Try it with problems with  the jazlyn with the dog with the man in the corner shop. 4. Trust   Developing a basic trust in yourself and your feelings.  Trusting in your own authority and intuition, even if you make some  mistakes along the way.  Honour your feelings. Taking responsibility for yourself and your own wellbeing. 5. Non-Striving   Meditation has no goal other than for you to be yourself. The irony is you  already are.  Paying attention to how you are right now - however that it is. Just watch.  The best way to achieve your own goals is to back off from striving and  instead start to really focus on carefully seeing and accepting things as they  are, moment by moment. With patience and regular practice, movement  towards your goals will take place by itself. 6. Acceptance   Seeing things as they actually are in the present. If you have a headache,  accept you have a headache. Meditation Maintenance: A Follow on Course © Belkys Hernandez   We often waste a lot of time and energy denying what is fact. We are trying  to force situations to how we would like them to be. This creates more  tension and prevents positive change occurring.  Now is the only time we have for anything. You have to accept yourself as  you are before you can really change.  Acceptance is not passive; it does not mean you have to like everything and  abandon your principles and values.  It does not mean you have to be  resigned to tolerating things. It does not mean that you should stop trying to  break free of your own self-destructive habits or give up your desire to  change and grow.  Acceptance is a willingness to see things as they are. You are much more  likely to know what to do and have an inner conviction to act when you have  a clear picture of what is actually happening. 7. Letting Go  Alivia Durbin Letting go is a way of letting things be, of accepting things as they are.  We let things go and we just watch   If we find it particularly difficult to let go of something because it has such a  strong hold on our mind, we can direct our attention to what holding feels  like. Holding on is the opposite of letting go. Being willing to look at the  ways we hold on shows a lot about its opposite.  You already know how to let go Every night when we go to sleep we let go of our bodily sensations, thoughts, worries.

## 2021-03-01 NOTE — PROGRESS NOTES
Behavioral Health Consultation/Psychotherapy Note  Brandon Hdz. Maria A Antunez Psy.D. 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 She said Awa Perez (22 y/o)  has been seeing Dr. Oralee Apley for about 5 years. She also had PPD after the birth of her second son. She said she's not been dealing well with all the stuff going on with Heriberto.  A GI appt last week indicated he may have cancer. She said she feels so bad for him since he can't catch a break. He got a good job at the [employer] said she's been dealing with depression and anxiety (including panic attacks) since her 20's. Her parents were  when she was in [place] and a good friend got killed around that time as well. She is Religious and her nikhil is very important to her. She thinks she could handle things with Heriberto better if she new his nikhil were strong. He has walked away from his nikhil in the past couple years. O:    Appearance    Patient presents as alert, oriented, and cooperative, tearful with moderate distress  Appetite normal  Sleep disturbance Some  Loss of pleasure Some  Speech    clear and understandable  Mood    Depressed, Anxious  Emptiness  Affect    anxiety  Thought Process    linear and coherent  Insight    Good  Judgment    Intact  Memory    recent and remote memory intact  Suicide Assessment    no suicidal ideation      A:    1. Adjustment disorder with mixed anxiety and depressed mood      Ct needing supportive psychotherapy to address stress, anxiety, coping, through the use of mindfulness and relying on the strength of her nikhil. P:    Doxy visit in 2-4 weeks, then meet in office. All questions about treatment plan answered. Patient instructed to go immediately to the emergency room and/or call 911 if any suicidal or homicidal ideations. Patient stated understanding and is agreeable to treatment and crisis plan.           Provider Signature:  Electronically signed by Tameka Hedrick PSYD on 3/1/2021 at 9:29 AM

## 2021-03-26 ENCOUNTER — VIRTUAL VISIT (OUTPATIENT)
Dept: PSYCHOLOGY | Age: 51
End: 2021-03-26
Payer: COMMERCIAL

## 2021-03-26 DIAGNOSIS — F43.23 ADJUSTMENT DISORDER WITH MIXED ANXIETY AND DEPRESSED MOOD: Primary | ICD-10-CM

## 2021-03-26 PROCEDURE — 90837 PSYTX W PT 60 MINUTES: CPT | Performed by: PSYCHOLOGIST

## 2021-03-26 NOTE — PROGRESS NOTES
treatment plan answered. Patient instructed to go immediately to the emergency room and/or call 911 if any suicidal or homicidal ideations. Patient stated understanding and is agreeable to treatment and crisis plan.           Provider Signature:  Electronically signed by Murali Hogue PSYD on 3/26/2021 at 3:01 PM

## 2021-04-09 ENCOUNTER — OFFICE VISIT (OUTPATIENT)
Dept: PSYCHOLOGY | Age: 51
End: 2021-04-09
Payer: COMMERCIAL

## 2021-04-09 ENCOUNTER — NURSE ONLY (OUTPATIENT)
Dept: LAB | Age: 51
End: 2021-04-09

## 2021-04-09 DIAGNOSIS — F43.23 ADJUSTMENT DISORDER WITH MIXED ANXIETY AND DEPRESSED MOOD: Primary | ICD-10-CM

## 2021-04-09 LAB
BASOPHILS # BLD: 0.6 %
BASOPHILS ABSOLUTE: 0 THOU/MM3 (ref 0–0.1)
C-REACTIVE PROTEIN: < 0.3 MG/DL (ref 0–1)
CALCIUM SERPL-MCNC: 9.7 MG/DL (ref 8.5–10.5)
EOSINOPHIL # BLD: 0.7 %
EOSINOPHILS ABSOLUTE: 0 THOU/MM3 (ref 0–0.4)
ERYTHROCYTE [DISTWIDTH] IN BLOOD BY AUTOMATED COUNT: 14.2 % (ref 11.5–14.5)
ERYTHROCYTE [DISTWIDTH] IN BLOOD BY AUTOMATED COUNT: 47.1 FL (ref 35–45)
HCT VFR BLD CALC: 44.3 % (ref 37–47)
HEMOGLOBIN: 13.9 GM/DL (ref 12–16)
IMMATURE GRANS (ABS): 0.02 THOU/MM3 (ref 0–0.07)
IMMATURE GRANULOCYTES: 0.3 %
LYMPHOCYTES # BLD: 31.3 %
LYMPHOCYTES ABSOLUTE: 2.1 THOU/MM3 (ref 1–4.8)
MAGNESIUM: 2.3 MG/DL (ref 1.6–2.4)
MCH RBC QN AUTO: 28.3 PG (ref 26–33)
MCHC RBC AUTO-ENTMCNC: 31.4 GM/DL (ref 32.2–35.5)
MCV RBC AUTO: 90.2 FL (ref 81–99)
MONOCYTES # BLD: 7.6 %
MONOCYTES ABSOLUTE: 0.5 THOU/MM3 (ref 0.4–1.3)
NUCLEATED RED BLOOD CELLS: 0 /100 WBC
PLATELET # BLD: 268 THOU/MM3 (ref 130–400)
PMV BLD AUTO: 9.8 FL (ref 9.4–12.4)
PTH INTACT: 34.8 PG/ML (ref 15–65)
RBC # BLD: 4.91 MILL/MM3 (ref 4.2–5.4)
SEDIMENTATION RATE, ERYTHROCYTE: 1 MM/HR (ref 0–20)
SEG NEUTROPHILS: 59.5 %
SEGMENTED NEUTROPHILS ABSOLUTE COUNT: 4 THOU/MM3 (ref 1.8–7.7)
VITAMIN D 25-HYDROXY: 36 NG/ML (ref 30–100)
WBC # BLD: 6.7 THOU/MM3 (ref 4.8–10.8)

## 2021-04-09 PROCEDURE — 90837 PSYTX W PT 60 MINUTES: CPT | Performed by: PSYCHOLOGIST

## 2021-04-09 PROCEDURE — 1036F TOBACCO NON-USER: CPT | Performed by: PSYCHOLOGIST

## 2021-04-09 NOTE — PROGRESS NOTES
Behavioral Health Consultation/Psychotherapy Note  Junior Shayla Mercedes Psy.D. Visit Date:  4/9/2021    Patient:  Marquis Alonzo  YOB: 1970  Chief Complaint:  Follow-up, Anxiety, Depression, and Stress    Duration of session:  60 minutes    This note will not be viewable in [facility] for the following reason(s). This is a Psychotherapy Note. S:     Wu Fury a lot of good and bad moments with dealing with grief. Last week she struggled with depression and anger. Still, she thinks she may have turned a corner. She talked about the insight with calling on her nikhil in God to help. We talked about challenges being disguised as opportunities for growth. She said riding horses is like food for her soul. She's had more good moments than bad, and feels much hope for the future even though she knows things with Prudence Giancarlo aren't great now. O:    Appearance    Patient presents as alert, oriented, and cooperative, tearful with moderate distress  Appetite normal  Sleep disturbance Some  Loss of pleasure Some  Speech    clear and understandable  Mood    Depressed, Anxious  Emptiness  Affect    anxiety  Thought Process    linear and coherent  Insight    Good  Judgment    Intact  Memory    recent and remote memory intact  Suicide Assessment    no suicidal ideation      A:    1. Adjustment disorder with mixed anxiety and depressed mood        Ct needing supportive psychotherapy to address stress, anxiety, coping, through the use of mindfulness and relying on the strength of her nikhil.      P:    Meet in office in 2-4 weeks. All questions about treatment plan answered. Patient instructed to go immediately to the emergency room and/or call 911 if any suicidal or homicidal ideations. Patient stated understanding and is agreeable to treatment and crisis plan.           Provider Signature:  Electronically signed by Mana Monteiro PSYD on 4/9/2021 at 10:34 AM

## 2021-04-09 NOTE — PATIENT INSTRUCTIONS
Pramillicent to the [facility]:    \"Lord, I may not know exactly what I need right now, but you do. Please send your [facility] to give me what you know I need. \"\"        Self-compassion affirmations by Dr. Fabienne Felton  https://self-compassion. org/exercise-2-self-compassion-break/    Think of a situation in your life that is difficult, that is causing you stress. Call the situation to mind, and see if you can actually feel the stress and emotional discomfort in your body. Now, say to yourself:    1. This is a moment of suffering    Thats mindfulness. Other options include: This hurts. Ouch. This is stress. 2. Suffering is a part of life    Thats common humanity. Other options include: Other people feel this way. Im not alone. We all struggle in our lives. Now, put your hands over your heart, feel the warmth of your hands and the gentle touch of your hands on your chest. Or adopt the soothing touch you discovered felt right for you. Say to yourself:    3. May I be kind to myself    You can also ask yourself, Roslyn Prado do I need to hear right now to express kindness to myself?  Is there a phrase that speaks to you in your particular situation, such as: May I give myself the compassion that I need  May I learn to accept myself as I am  May I forgive myself  May I be strong.   May I be patient  This practice can be used any time of day or night, and will help you remember to evoke the three aspects of self-compassion when you need it most.

## 2021-04-14 ENCOUNTER — OFFICE VISIT (OUTPATIENT)
Dept: FAMILY MEDICINE CLINIC | Age: 51
End: 2021-04-14
Payer: COMMERCIAL

## 2021-04-14 VITALS
SYSTOLIC BLOOD PRESSURE: 114 MMHG | DIASTOLIC BLOOD PRESSURE: 60 MMHG | HEART RATE: 74 BPM | BODY MASS INDEX: 21.73 KG/M2 | OXYGEN SATURATION: 98 % | WEIGHT: 143.4 LBS | HEIGHT: 68 IN | TEMPERATURE: 98.4 F | RESPIRATION RATE: 12 BRPM

## 2021-04-14 DIAGNOSIS — K90.0 CELIAC DISEASE: ICD-10-CM

## 2021-04-14 DIAGNOSIS — E78.9 LIPID DISORDER: ICD-10-CM

## 2021-04-14 DIAGNOSIS — E06.3 HYPOTHYROIDISM DUE TO HASHIMOTO'S THYROIDITIS: ICD-10-CM

## 2021-04-14 DIAGNOSIS — E03.8 HYPOTHYROIDISM DUE TO HASHIMOTO'S THYROIDITIS: ICD-10-CM

## 2021-04-14 DIAGNOSIS — F41.9 ANXIETY: Primary | ICD-10-CM

## 2021-04-14 DIAGNOSIS — J45.20 MILD INTERMITTENT ASTHMA WITHOUT COMPLICATION: ICD-10-CM

## 2021-04-14 DIAGNOSIS — R41.3 MEMORY CHANGE: ICD-10-CM

## 2021-04-14 DIAGNOSIS — R53.83 TIRED: ICD-10-CM

## 2021-04-14 PROBLEM — F32.A DEPRESSIVE DISORDER: Status: ACTIVE | Noted: 2020-06-16

## 2021-04-14 PROBLEM — G47.00 INSOMNIA: Status: ACTIVE | Noted: 2020-01-22

## 2021-04-14 PROBLEM — N80.9 ENDOMETRIOSIS: Status: ACTIVE | Noted: 2020-06-16

## 2021-04-14 PROCEDURE — G8420 CALC BMI NORM PARAMETERS: HCPCS | Performed by: FAMILY MEDICINE

## 2021-04-14 PROCEDURE — 1036F TOBACCO NON-USER: CPT | Performed by: FAMILY MEDICINE

## 2021-04-14 PROCEDURE — 99214 OFFICE O/P EST MOD 30 MIN: CPT | Performed by: FAMILY MEDICINE

## 2021-04-14 PROCEDURE — G8427 DOCREV CUR MEDS BY ELIG CLIN: HCPCS | Performed by: FAMILY MEDICINE

## 2021-04-14 SDOH — ECONOMIC STABILITY: FOOD INSECURITY: WITHIN THE PAST 12 MONTHS, THE FOOD YOU BOUGHT JUST DIDN'T LAST AND YOU DIDN'T HAVE MONEY TO GET MORE.: NEVER TRUE

## 2021-04-14 SDOH — ECONOMIC STABILITY: INCOME INSECURITY: HOW HARD IS IT FOR YOU TO PAY FOR THE VERY BASICS LIKE FOOD, HOUSING, MEDICAL CARE, AND HEATING?: NOT HARD AT ALL

## 2021-04-14 SDOH — ECONOMIC STABILITY: TRANSPORTATION INSECURITY
IN THE PAST 12 MONTHS, HAS THE LACK OF TRANSPORTATION KEPT YOU FROM MEDICAL APPOINTMENTS OR FROM GETTING MEDICATIONS?: NO

## 2021-04-14 SDOH — ECONOMIC STABILITY: TRANSPORTATION INSECURITY
IN THE PAST 12 MONTHS, HAS LACK OF TRANSPORTATION KEPT YOU FROM MEETINGS, WORK, OR FROM GETTING THINGS NEEDED FOR DAILY LIVING?: NO

## 2021-04-14 SDOH — ECONOMIC STABILITY: FOOD INSECURITY: WITHIN THE PAST 12 MONTHS, YOU WORRIED THAT YOUR FOOD WOULD RUN OUT BEFORE YOU GOT MONEY TO BUY MORE.: NEVER TRUE

## 2021-04-14 ASSESSMENT — PATIENT HEALTH QUESTIONNAIRE - PHQ9
2. FEELING DOWN, DEPRESSED OR HOPELESS: 0
1. LITTLE INTEREST OR PLEASURE IN DOING THINGS: 0
SUM OF ALL RESPONSES TO PHQ QUESTIONS 1-9: 0
SUM OF ALL RESPONSES TO PHQ QUESTIONS 1-9: 0

## 2021-04-14 NOTE — PATIENT INSTRUCTIONS
Thank you   1. Thank you for trusting us with your healthcare needs. You may receive a survey regarding today's visit. It would help us out if you would take a few moments to provide your feedback. We value your input. 2. Please bring in ALL medications BOTTLES, including inhalers, herbal supplements, over the counter, prescribed & non-prescribed medicine. The office would like actual medication bottles and a list.   3. Please note our OFFICE POLICIES:   a. Prior to getting your labs drawn, please check with your insurance company for benefits and eligibility of lab services. Often, insurance companies cover certain tests for preventative visits only. It is patient's responsibility to see what is covered. b. We are unable to change a diagnosis after the test has been performed. c. Lab orders will not be re-printed. Please hold onto your original lab orders and take them to your lab to be completed. d. If you no show your scheduled appointment three times, you will be dismissed from this practice. e. Joan Mckusick must be completed 24 hours prior to your schedule appointment. 4. If the list below has been completed, PLEASE FAX RECORDS TO OUR OFFICE @ [phone].  Once the records have been received we will update your records at our office:  Health Maintenance Due   Topic Date Due    Hepatitis C screen  Never done    Pneumococcal 0-64 years Vaccine (1 of 1 - PPSV23) Never done    HIV screen  Never done    COVID-19 Vaccine (1) Never done    DTaP/Tdap/Td vaccine (1 - Tdap) Never done    Cervical cancer screen  03/17/2016    TSH testing  08/20/2020

## 2021-04-14 NOTE — PROGRESS NOTES
[address] [facility]. [address] [place] [address]  Dept: [phone]  Dept Fax: [phone]  Loc: [phone]      Shaina Brown is a 53 y.o. White female. [place]  presents to the [place] [address] clinic today for   Chief Complaint   Patient presents with    Follow-up   , and;   1. Anxiety    2. Mild intermittent asthma without complication    3. Celiac disease    4. Hypothyroidism due to Hashimoto's thyroiditis    5. Lipid disorder    6. Tired    7. Memory change          I have reviewed Shaina Borwn medical, surgical and other pertinent history in detail, and have updated medication and allergy information in the computerized patient record. Clinical Care Team:     -Referring Provider for today's consult: self  -Primary Care Provider: Jacqueline Delgadillo DO    Medical/Surgical History:   She  has a past medical history of Celiac disease and Hypothyroidism. Her  has a past surgical history that includes Upper gastrointestinal endoscopy (November 2013); Colonoscopy (July 2013); Appendectomy (2000); Anus surgery (2000); and other surgical history. Family/Social History:     Her family history includes High Cholesterol in her father. She  reports that she has never smoked. She has never used smokeless tobacco. She reports that she does not drink alcohol or use drugs.     Medications/Allergies/Immunizations:     Her current medication(s) include   Current Outpatient Medications:     Cholecalciferol (VITAMIN D3) 25 MCG (1000 UT) CAPS, Take 2 capsules by mouth daily , Disp: , Rfl:     clindamycin (CLEOCIN T) 1 % external solution, Apply topically as needed, Disp: , Rfl: 3    Lysine 500 MG TABS, Take 500 mg by mouth 4 times daily, Disp: , Rfl:     Ascorbic Acid (VITAMIN C) 1000 MG tablet, Take 1,000 mg by mouth 4 times daily , Disp: , Rfl:     Menaquinone-7 (VITAMIN K2) 100 MCG CAPS, Take 1 capsule by mouth 2 times daily, Disp: , Rfl:     TAZORAC 0.1 % cream, Apply as needed nightly, Disp: , Rfl:     NONFORMULARY, Progesterone compounded capsule- 1 capsule daily on last half of cycle, Disp: , Rfl:     Specialty Vitamins Products (MAGNESIUM, AMINO ACID CHELATE,) 133 MG tablet, Take 1 tablet by mouth 4 times daily. , Disp: , Rfl:     ferrous sulfate 325 (65 FE) MG tablet, Take 325 mg by mouth nightly., Disp: , Rfl:     Omega-3 1400 MG CAPS, Take 2 capsules by mouth 6 times daily [facility]S  Or NOW1 before and as finish meals and at bedtime, Disp: , Rfl:     Cinnamon 500 MG CAPS, Take 1 capsule by mouth 4 times daily (before meals and nightly). , Disp: , Rfl:     Magnesium Citrate 100 MG TABS, Take 3 tablets by mouth daily , Disp: , Rfl:     THYROID PO, Compound Thyroid 40 mcg/12 mcg Take 1 capsule by mouth daily, Disp: , Rfl:     DHEA 10 MG TABS, Take 15 mg by mouth daily Alternate 10 mg and 20 mg, Disp: , Rfl:     QUERCETIN PO, Take 1 capsule by mouth daily as needed (allergies). , Disp: , Rfl:     B Complex Vitamins (VITAMIN B COMPLEX) TABS, Take 1 tablet by mouth 3 times daily (before meals) [employer], or natures made, Disp: , Rfl:   Allergies: Patient has no known allergies. Immunizations: There is no immunization history on file for this patient. History of Present Illness:     Irina's had concerns including Follow-up. Jovon Pandey  presents to the [address] [place] [address] today for;   Chief Complaint   Patient presents with    Follow-up   , abnormal labs follow up and these conditions as she  Is looking today for:     1. Anxiety    2. Mild intermittent asthma without complication    3. Celiac disease    4. Hypothyroidism due to Hashimoto's thyroiditis    5. Lipid disorder    6. Tired    7. Memory change      HPI    Subjective:     Review of Systems   All other systems reviewed and are negative.       Objective:     /60 (Site: Right Upper Arm, Position: Sitting, Cuff Size: Medium Adult)   Pulse 74   Temp 98.4 °F (36.9 °C) (Oral)   Resp 12   Ht 5' 8\" (1.727 m)   Wt 143 lb 6.4 oz (65 kg)   SpO2 98%   BMI 21.80 kg/m²   Physical Exam  Vitals signs and nursing note reviewed. Constitutional:       Appearance: Normal appearance. HENT:      Head: Normocephalic. Pulmonary:      Effort: Pulmonary effort is normal.   Neurological:      Mental Status: She is alert. Psychiatric:         Mood and Affect: Mood normal.         Thought Content: Thought content normal.            Laboratory Data:   Lab results were searched in Care Everywhere and/or those brought by the pateint were reviewed today with [place] and she has a copy of their most recent labs to take home with them as notedbelow;       Imaging Data:   Imaging Data:       Assessment & Plan:       Impression:  1. Anxiety    2. Mild intermittent asthma without complication    3. Celiac disease    4. Hypothyroidism due to Hashimoto's thyroiditis    5. Lipid disorder    6. Tired    7. Memory change      Assessment and Plan:  After reviewing the patients chief complaints, reviewing their labfindings in great detail (with the patient and those accompanying them) which correlate to their chief complaints, symptoms, and or medical conditions; suggestions were made relating to changes in diet and or supplements which may improve the complaints and which will be reflected in their future lab findings; Chief Complaint   Patient presents with    Follow-up   ;    Plans for the next visits:  - Abnormal and non-optimal Labs were ordered today to be repeated in the next 120-365 days to assess changes from adjustments in nutrition and or nutrients.    - Patient instructed when having a blood draw to ask the  to divide their lab draws into multiple draws over several days if not feeling good at the time of the lab draw or if either prefers to do several smaller blood draws over several days  -Patient instructed to check with insurer before each lab draw and to to to the lab which the insurer directs them for the most cost effective lab draw with the least patient's cost  - Tushar Meehan  will be scheduled subsequent to those results. Allyn Gutiérrez will bring in her drink and food log to her next visit    Chronic Problems Addressed on this Visit:                                   1.  Intensity of Service; Uncontrolled items at this visit; Chief Complaint   Patient presents with    Follow-up   ; Improved items at this visit; Stable items at this visit;  2. Patients food and drinks were reviewed with the patient,       - Tushar Meehan will bring food+drink symptom log to next visit for inclusion in their record      - 75 better food list reviewed & given to patient with the omega 6 food list to avoid         - Gluten in corn and oats abstracts sheet reviewed and given to the patient today   3. Greater than 25 minutes were spent face to face on this visit of which >50% was for counseling and coordination of care. Patients food and drinks were reviewed with the patient,   - they will bring a food drink symptom log to future visits for inclusion in their record    - 75 better food list reviewed & given to patient along with the omega 6 food list to avoid      - Gluten in corn and oats abstracts sheet reviewed and given to the patient today    - 23 Foods containing Latex-like proteins was reviewed and copy to be taken if desired     - Nutrient Supplements list provided and copyto be taken if desired    - [employer]. [employer] web site offered to patient to review at their convenience by staff with login information    Note:  I have discussed with the patient that with all nutraceuticals, there is often mixed data and emerging research which needs to be monitored; as well as an array of NIHfact sheets on nutrients and supplements.      If I have recommended cinnamon at the request of this patient to assist them in control of their blood sugar, triglyceride and or weight issues. I discussed that thepatient's clinical use of cinnamon bark, calcium, magnesium, Vitamin D and pharmaceutical grade CVS #470049 fish oil or triple-strength fish oil, and B-75 two phase time-released B complex by Christie Cochran will be for atime-limited trial to determine their individual effectiveness and safety in this patient. I also referred the patient to the NMCD: Nutrition, Metabolism, and Cardiovascular Diseases (journal) and concerns about long-termuse and hepatotoxicity of cinnamon and other nutrients and suggest they frequently search nih.gov for the latest non-proprietary information on nutriceuticals as well as consider a subscription to BandPage fordetails on reviewed supplements, or at the least review the nutrient files at [address] [place] [facility] at [facility], [address] [place] [address]     Cinnamon bark, an insulin mimetic, reduces some High Carbohydrate Dietary Impacts. Methylhydroxychalcone polymers insulin-enhancing properties in fat cells are responsible for enhanced glucose uptake, inhibiting hepatic HMG-CoA reductase and lowers lipids. www.jacn. org/content/20/4/327.full     But cinnamon with additivessuch as Cinnamon Extract are not effective as insulin mimetics.  :eStoreDirectory.at     Nutrients for Start up from [employer] or [employer] for ease to get started now;  Keyur Desai has some useable products;  - Triple Strength Fish Oil, enteric coated  - Vit D 3 5000 IU gel caps  - Iron ferrous sulfat 325 mg tabs  - Centrum Silver look-a-like for most patients, or  - Centrum plain look-a-like if need iron    Local pharmacies or chains such as [employer] [employer], have;  - Triple Strength Fish Oil (enteric coated if available) or    If not enteric coated, can take from freezer for less burps  - B-50 or B-100 released balanced B complex tabs  - Cinnamon bark 500 mg (without Chromium or extracts)   some brands list 1000 mg / serving of 2 capsules,    some brands have 1000 mg caps with the undesireable chromium / extract  - Calcium carbonate/citrate, magnesium oxide/citrate, Vit D 3  as 3-4 tabs/caps/serving     Some Local Brands may contain Zinc which is acceptable for the first bottle or two  - Magnesium oxide 250 mg tabs for those having < 2 bowel movements daily  - Magnesium citrate 200 mg if having > 2bowel movement/day  - Centrum Silver or look-a-like for most patients, Centrum plain or look-a-like with iron  - Vitamin D-3 comes as 1,000 IU or 2,000 IU or 5,000 IU gel caps or Liquid drops      Some brands containing or derived from soy oil or corn oil are OK if not allergic to soy  - Elemental Iron 65 mg tabs at bedtime is available over the counter if need more iron     Usually turns bowel movements grey, green, or black but not a concern  - Apricot Kernel Oil (by Now) for dry skin sensitive perineal or perianal area skin    Nutrients for ongoing use by Mail order for less expense from [employer] ;  - Strength Fish Oil , 240 Softgels Item #438190  -B-100 time released balanced B complex Item #188283  - Cinnamon bark 500 mg without Chromium or extract Item #281942  - Calcium carbonate 1000 mg, Magnesium oxide 500 mg, Vit D 3  400 IU Item #560512  - Magnesium oxide 500 mg tabs Item #272409 if less than 2 bowel movements daily  - ABC Seniors Item #896737 for most patients, One Daily Item #229282 with iron  - Vit D 3  1,000 Item #897681      2,000 IU Item #430471   Item #001633     Some brands containing orderived from soy oil or corn oil are OK if not allergic to soy    Nutrients for Special Needs by Mail order for less expense from www. puritan.com ;  -Elemental Iron 65 mg tabs Item #774015 if need more iron for low iron on labs    Usually turns bowel movements grey, green or black but not a concern  - Time released Niacin 250 mg Item #595825 for cold intolerance, low libido or impotence  - DHEA 50 mg Item #655681 for improving DHEA levels on labs if having Fatigue    If stools too loose substitute for your Magnesium oxide using;   Magnesium citrate 200 mg tabs (NOT liquid) at [employer]   Magnesium gluconate 550 mg by [place] at [employer] or [employer]. com  Magnesium chloride foot soaks or body sprays  www.Engage   Magnesium chloride flakes 14.99 Item #: [id number] if Back ordered get spray    Food Drink Symptom Log;  I asked this patient to track these items and any other symptoms on their list on a weekly basis to documenttheir progress or lack of same. This can be done on the symptom tracking sheet I gave them at today's visit but looks like this:                                                      Rate on scale of 0-10 with zero = not noticeable  Symptom:                            Week 1               2                 3                 4               Etc            Hair loss    Foot cramps    Paresthesia    Aches    IBS (irritable bowel)    Constipation    Diarrhea  Nocturia (up to bathroom at night)    Fatigue/Energy level  Stress      On the other side of the sheet they can track their food, drink, environment, activity, symptoms etc      Avoiding Latex-like proteins in my foods; Avocados, Bananas, Celery, Figs & Kiwi proteins have latex-like proteins to inflame our immune systems  How Can I Have A Latex Allergy? Eating foods with latex-like protein exposes us to latex allergies. Our body cannot tell the differencebetween these latex-like proteins and latex from rubber products since many people are allergic to fruit, vegetables and latex. Read labels on pre-packaged foods. This list to avoid is only a guide if you are known allergicto latex or have a latex rash on your chin, cheeks and lines on your neck and chest. The amount of latex is different in each food product or fruit variety.   to Avoid out of Season if not grown locally: Melon, Nectarine, [address], Richard Pong, Passion fruit, [place], Chestnuts, and Tomato. Avocado, Banana, Celery, Figs, and Kiwi always contain Latex-like protein. Whats in Season? Strawberries taste better in June than December because June is strawberry season so buy locally grown produce \"in season\" for the best flavor, cost and less Latex. Locally grown produce notonly tastes great requires little of no ethylene exposure in food distribution so has less latex content. Out of season, use canned, frozen or dried since processed ripe and are latex lower!!!     Month     [place] Locally Grown Produce  January, February, March: use canned, frozen or dried fruits since lower in latex  April: asparagus, radishes  May: asparagus, broccoli, green onions, greens, peas, radishes, rhubarb  June: asparagus, beets, beans, broccoli, cabbage, cantaloupe, carrots, green onions, greens, lettuce, onions, parsley, peas, radishes, rhubarb, strawberries, watermelons  July: beans, beets, blueberries, broccoli, cabbage, cantaloupe, carrots, cauliflower, celery, cucumbers, eggplant, grapes, green onions, greens, lettuce, onions, parsley, peas, peaches, bell peppers, potatoes, radishes, summer raspberries, squash, sweetcorn, tomatoes, turnips, watermelons  August: apples, beans, beets, blueberries, cabbage, cantaloupe, carrots, cauliflower, celery, cucumbers, eggplant, grapes, green onions, greens, lettuce, onions, parsley, peas, peaches, pears, bell peppers, potatoes, radishes, squash, sweet corn, tomatoes, turnips, watermelons  September: apples, beans, beets, blueberries, cabbage, cantaloupe, carrots, cauliflower, celery, cucumbers, eggplant, grapes, green onions, greens, lettuce, onions, parsley, peas, peaches, pears, bell peppers, plums, potatoes, pumpkins, radishes, fall red raspberries, squash, sweet corn, tomatoes, turnips, watermelons  October: apples, beets, broccoli, cabbage, carrots, cauliflower, celery, green onions, greens, lettuce, parsley, peas, pears, potatoes, pumpkins, radishes, cross-react with latex? (Abbie et al.)    References:   Latex in Foods Allergy, http://ehp.niehs.nih.gov/members/2003/5811/5811.html    Search web for [employer] in Season \" for whereyou live or are at the time you food shop  www.nutritioncouncil.org/pdf/healthy/SeasonalProduce. pdf ,   Management of Latex, ://medicalcenter. [employer].edu/  search for latex

## 2021-04-23 ENCOUNTER — OFFICE VISIT (OUTPATIENT)
Dept: PSYCHOLOGY | Age: 51
End: 2021-04-23
Payer: COMMERCIAL

## 2021-04-23 DIAGNOSIS — F43.23 ADJUSTMENT DISORDER WITH MIXED ANXIETY AND DEPRESSED MOOD: Primary | ICD-10-CM

## 2021-04-23 PROCEDURE — 90837 PSYTX W PT 60 MINUTES: CPT | Performed by: PSYCHOLOGIST

## 2021-04-23 PROCEDURE — 1036F TOBACCO NON-USER: CPT | Performed by: PSYCHOLOGIST

## 2021-04-23 NOTE — PATIENT INSTRUCTIONS
Personal Thought  Control. Our thinking often creates anxiety for us. Getting better control of our thinking can go a long way in helping us cope. The following steps can be useful. 1. Let yourself become aware of thoughts you have when you are anxious. What are the words that you are saying to yourself at that moment? Sometimes it takes a little practice before we become aware of our thoughts. Some examples might be:  I know something bad is going to happen, or This is horrible or Trinna Greenwood is this happening to me!?  2. Write your thoughts down. Its much easier to work with our thoughts, analyze them, and replace them if they are in black and white.   3. Ask yourself the following questions about your thoughts:  a. Is it true? (Is it logically correct? Where is the evidence to support the truth of that thought? Are there alternative ways of thinking that would be more correct?). If a thought is not as true as it could be, replace it with a more realistic and helpful one. The majority of thoughts we have that generate anxiety are not the most realistic appraisals of the situation. b. So what? (If this is logically correct, what does it mean to me? Is there anything I can do about the situation? Is it in my best interest to get anxious about this?). 4. Use coping self-statements. When feeling anxious, you may be able to tell yourself automatic phrases without thinking too much about it. A couple of examples would be phrases such as Its OK, I can handle it, or Ive been through things like this before and have done all right.   Notice that these statements tend to be true for all of us. 5. Notice a change in your emotional state as you change your thinking. As your thoughts become more realistic, you will probably notice a decrease in anxiety and tension, and an increase in your ability to cope. Why is emotional intelligence (EQ) so important?   Intellectual intelligence (IQ) stressful a situation becomes. Stress busting: functioning well in the heat of the moment  Develop your stress-busting skills by working through the following three steps:  Realize when youre stressed - The first step to reducing stress is recognizing what stress feels like. How does your body feel when youre stressed? Are your muscles or stomach tight or sore? Are your hands clenched? Is your breath shallow? Being aware of your physical response to stress will help regulate tension when it occurs. Identify your stress response - Everyone reacts differently to stress. If you tend to become angry or agitated under stress, you will respond best to stress-relieving activities that quiet you down. If you tend to become depressed or withdrawn, you will respond best to stress-relieving activities that are stimulating. If you tend to freeze--speeding up in some ways while slowing down in others--you need stress-relieving activities that provide both comfort and stimulation. Discover the stress-busting techniques that work for you - The best way to reduce stress quickly is by engaging one or more of your senses: sight, sound, smell, taste, and touch. Each person responds differently to sensory input, so you need to find things that are soothing and/or energizing to you. For example, if youre a visual person you can relieve stress by surrounding yourself with uplifting images. If you respond more to sound, you may find a wind chime, a favorite piece of music, or the sound of a water fountain helps to quickly reduce your stress levels. Emotional intelligence (EQ) skill 2: Beat relationship stress with emotional awareness  Being able to connect to your emotions--having a [id number] awareness of your emotions and how they influence your thoughts and actions--is the key to understanding yourself and remaining calm and focused in tense situations with others.   Many people are disconnected from their emotions--especially strong core emotions such as anger, sadness, fear, and molly. This may be the result of negative childhood experiences that taught you to try to shut off your feelings. But although we can distort, deny, or numb our feelings, we cant eliminate them. Tiffanie Linger still there, whether were aware of them or not. Unfortunately, without emotional awareness, we are unable to fully understand our own motivations and needs, or to communicate effectively with others. We are also at far greater risk for becoming overwhelmed in situations that appear threatening. What kind of a relationship do you have with your emotions? Do you experience feelings that flow, encountering one emotion after another as your experiences change from moment to moment? Are your emotions accompanied by physical sensations that you experience in places like your stomach or chest?   Do you experience discrete feelings and emotions, such as anger, sadness, fear, molly, each of which is evident in subtle facial expressions? Can you experience intense feelings that are strong enough to capture both your attention and that of others? Do you pay attention to your emotions? Do they factor into your decision making? If any of these experiences are unfamiliar, your emotions may be turned down or turned off. In order to be emotionally healthy and emotionally intelligent, you must reconnect to your core emotions, accept them, and become comfortable with them. Emotional intelligence (EQ) skill 3: Nonverbal communication  Being a good communicator requires more than just verbal skills and the ability to manage stress. Often, what you say is less important than how you say it, or the other nonverbal signals you send out--the gestures you make, the way you sit, how fast or how loud you talk, how close you stand, or how much eye contact you make.  In order to hold the attention of others and build connection and trust, you need to be aware of, and in control of, this body language. You also need to be able to accurately read and respond to the nonverbal cues that other people send you. These messages dont stop when someone stops speaking. Even when youre silent, youre still communicating nonverbally. Think about what you are transmitting as well, and if what you say matches what you feel. If you insist, Im fine,\" while clenching your teeth and looking away, your body is clearly signaling the opposite. Your nonverbal messages can produce a sense of interest, trust, excitement, and desire for connection--or they can generate fear, confusion, distrust, and disinterest.  Tips for improving nonverbal communication  Successful nonverbal communication depends on your ability to manage stress, recognize your own emotions, and understand the signals youre sending and receiving. When communicating: Focus on the other person. If you are planning what youre going to say next, daydreaming, or thinking about something else, you are almost certain to miss nonverbal cues and other subtleties in the conversation. Make eye contact. Eye contact can communicate interest, maintain the flow of a conversation, and help gauge the other persons response. Pay attention to nonverbal cues youre sending and receiving, such as facial expression, tone of voice, posture and gestures, touch, and the timing and pace of the conversation. Emotional intelligence (EQ) skill 4: Use humor and play to deal with challenges  Humor, laughter, and play are natural antidotes to lifes difficulties; they lighten your burdens and help you keep things in perspective. A good hearty laugh reduces stress, elevates mood, and brings your nervous system back into balance. Playful communication broadens your emotional intelligence and helps you:  Take hardships in stride.  By allowing you to view your frustrations and disappointments from new perspectives, laughter and play enable you to survive annoyances, hard times, and setbacks. Smooth over differences. Using gentle humor often helps you say things that might be otherwise difficult to express without creating a flap. Simultaneously relax and energize yourself. Playful communication relieves fatigue and relaxes your body, which allows you to recharge and accomplish more. Become more creative. When you loosen up, you free yourself of rigid ways of thinking and being, allowing you to get creative and see things in new ways. How to develop playful communication: Its never too late to develop and embrace your playful, humorous side. Try setting aside regular, quality playtime. The more you joke, play, and laugh--the easier it becomes. Find enjoyable activities that loosen you up and help you embrace your playful nature. Practice by playing with animals, babies, young children, and outgoing people who appreciate playful banter. Emotional intelligence (EQ) skill 5: Resolve conflict positively  Conflict and disagreements are inevitable in relationships. Two people cant possibly have the same needs, opinions, and expectations at all times. However, that neednt be a bad thing. Resolving conflict in healthy, constructive ways can strengthen trust between people. When conflict isnt perceived as threatening or punishing, it fosters freedom, creativity, and safety in relationships. The ability to manage conflicts in a positive, trust-building way is supported by the previous four skills. Once you know how to manage stress, stay emotionally present and aware, communicate nonverbally, and use humor and play, youll be better equipped to handle emotionally charged situations and catch and defuse many issues before they escalate. Tips for resolving conflict in a trust-building way  Stay focused in the present.  When you are not holding on to old hurts and resentments, you can recognize the reality of a current situation and view it as a new

## 2021-04-23 NOTE — PROGRESS NOTES
Behavioral Health Consultation/Psychotherapy Note  Varsha Quiles. Cathryn Valdez Psy.D. Visit Date:  4/23/2021    Patient:  Malka Lloyd  YOB: 1970  Chief Complaint:  Follow-up, Depression, and Stress    Duration of session:  60 minutes    This note will not be viewable in [employer] [facility] for the following reason(s). This is a Psychotherapy Note. S:     Today we talked about how ct is continually calling on God's александр to help her with Heriberto's situation. She so much wants to control it, make it better, make Tim Orchard make the \"right\" decisions, all things she can't do. She expressed her thoughts and feelings and talked about the role of her nikhil in her life and how it's THE source of strength for her. O:    Appearance    Patient presents as alert, oriented, and cooperative, tearful with moderate distress  Appetite normal  Sleep disturbance Some  Loss of pleasure Some  Speech    clear and understandable  Mood    Depressed, Anxious  Emptiness  Affect    anxiety  Thought Process    linear and coherent  Insight    Good  Judgment    Intact  Memory    recent and remote memory intact  Suicide Assessment    no suicidal ideation      A:    1. Adjustment disorder with mixed anxiety and depressed mood        Ct needing supportive psychotherapy to address stress, anxiety, coping, through the use of mindfulness and relying on the strength of her nikhil.      P:    Meet again in 2 weeks. All questions about treatment plan answered. Patient instructed to go immediately to the emergency room and/or call 911 if any suicidal or homicidal ideations. Patient stated understanding and is agreeable to treatment and crisis plan.           Provider Signature:  Electronically signed by Yenifer Cruz PSYD on 4/23/2021 at 2:22 PM

## 2021-05-07 ENCOUNTER — OFFICE VISIT (OUTPATIENT)
Dept: PSYCHOLOGY | Age: 51
End: 2021-05-07
Payer: COMMERCIAL

## 2021-05-07 ENCOUNTER — NURSE ONLY (OUTPATIENT)
Dept: LAB | Age: 51
End: 2021-05-07

## 2021-05-07 DIAGNOSIS — F43.23 ADJUSTMENT DISORDER WITH MIXED ANXIETY AND DEPRESSED MOOD: Primary | ICD-10-CM

## 2021-05-07 PROCEDURE — 90837 PSYTX W PT 60 MINUTES: CPT | Performed by: PSYCHOLOGIST

## 2021-05-07 PROCEDURE — 1036F TOBACCO NON-USER: CPT | Performed by: PSYCHOLOGIST

## 2021-05-07 NOTE — PROGRESS NOTES
Behavioral Health Consultation/Psychotherapy Note  Felicita Walters. Ghanshyam Almendarez Psy.D. Visit Date:  5/7/2021    Patient:  Lizzette Kim  YOB: 1970  Chief Complaint:  Follow-up and Stress    Duration of session:  60 minutes    This note will not be viewable in [employer] for the following reason(s). This is a Psychotherapy Note. S:     Ct said Luis Allison starts his cancer treatments this morning. Luis Allison didn't pass his PT test, and there's a chance he could try again in August if he's feeling well enough. She's had Prozac, Zoloft, and Wellbutrin in the past, but nothing really worked for her anxiety. She's currently trying an herbal supplement that seems to be working. We talked about limits of control, thought anxiety, thinking errors, and how to accept uncertainty today, while weaving in aspects of her nikhil that are important to her. O:    Appearance    Patient presents as alert, oriented, and cooperative, tearful with moderate distress  Appetite normal  Sleep disturbance Some  Loss of pleasure Some  Speech    clear and understandable  Mood    Depressed, Anxious  Emptiness  Affect    anxiety  Thought Process    linear and coherent  Insight    Good  Judgment    Intact  Memory    recent and remote memory intact  Suicide Assessment    no suicidal ideation      A:    1. Adjustment disorder with mixed anxiety and depressed mood      Ct needing supportive psychotherapy to address stress, anxiety, coping, through the use of mindfulness and relying on the strength of her nikhil.      P:    Meet again in 2 weeks. All questions about treatment plan answered. Patient instructed to go immediately to the emergency room and/or call 911 if any suicidal or homicidal ideations. Patient stated understanding and is agreeable to treatment and crisis plan.           Provider Signature:  Electronically signed by Emelyn Yap PSYD on 5/7/2021 at 9:40 AM

## 2021-05-07 NOTE — PATIENT INSTRUCTIONS
Maybe God makes certain tasks impossible, so we stop trying and let God handle it. It's OK to back off and listen to what God wants for you. Sometimes we struggle because we put ourselves in the way of God's plan, and we just need to get out of the way. Thinking errors  A. Filling in the blanks-there is missing information and you decide what it is  1. Mind reading-you think you know what other people are thinking [facility] thinks Im stupid)  2. Fortune telling-you predict the future (I know I wont get the job)  B. Negativity-tendency to be negative and pessimistic  1. Catastrophizing-you believe things will be so terrible that it will be unbearable (Ill never be able to go there again if I fail this test)  2. Labeling-you assign global negative traits to yourself (Im a failure)  3. Discounting positives-you claim that anything positive about you is trivial and doesnt count (That test was easy, so passing it didnt count)  4. Negative filter-you focus only on the negatives (Look at all of the people who dont like me)  5. What if?-you engage in a never-ending questioning of what if this happens? [facility] if I get anxious? What if I say something stupid? )  6. Inability to disconfirm-you reject any evidence that contradicts your negative thoughts (My mom may love me but Im still unloveable)  C. Faulty logic; drawing a false conclusion-jumping to conclusions without sufficient data or with distorted reasoning  1. Overgeneralizing-you conclude there is global pattern of negatives based on one incident (I didnt get the job, I cant ever do anything right)  2. Personalizing-you accept the blame for negative events (Nadiya Garcia broke up with me because Im stupid)  3. Blaming-you focus on others as causing your problems instead of taking responsibility for the part you played (Its his fault Im struggling in school)  4.  Unfair comparisons-you compare yourself to extreme examples and then conclude you are inferior (Shes the prettiest girl in the class, Im just so ugly)  5. Emotional reasoning-you interpret the situation based on how you feel (I feel depressed, I think this relationship must be over)  D. Faulty rules-holding yourself to unrealistic standards or rules  1. Shoulds-you  yourself harshly for how you think you should have acted instead of how you actually did (I shouldve done better, Im such a failure)  2. Regret orientation-you focus on past failures and are critical of how you didnt do better (I couldve gotten a better job if Id tried harder)  Kasey Mons thinking-interpreting events in extremes without being able to see things as a complicated mixture of good and bad  1. Dichotomous thinking-you view events in all-or-nothing terms (I always get rejected by everyone)  2. Judgment focus-you place a value of good or bad on self, others, events (Im not good at anything but she is great at everything)        Personal Thought  Control. Our thinking often creates anxiety for us. Getting better control of our thinking can go a long way in helping us cope. The following steps can be useful. 1. Let yourself become aware of thoughts you have when you are anxious. What are the words that you are saying to yourself at that moment? Sometimes it takes a little practice before we become aware of our thoughts. Some examples might be:  I know something bad is going to happen, or This is horrible or Fernando Root is this happening to me!?  2. Write your thoughts down. Its much easier to work with our thoughts, analyze them, and replace them if they are in black and white.   3. Ask yourself the following questions about your thoughts:  a. Is it true? (Is it logically correct? Where is the evidence to support the truth of that thought? Are there alternative ways of thinking that would be more correct?).   If a thought is not as true as it could be, replace it with a more realistic and helpful one. The majority of thoughts we have that generate anxiety are not the most realistic appraisals of the situation. b. So what? (If this is logically correct, what does it mean to me? Is there anything I can do about the situation? Is it in my best interest to get anxious about this?). 4. Use coping self-statements. When feeling anxious, you may be able to tell yourself automatic phrases without thinking too much about it. A couple of examples would be phrases such as Its OK, I can handle it, or Ive been through things like this before and have done all right.   Notice that these statements tend to be true for all of us. 5. Notice a change in your emotional state as you change your thinking. As your thoughts become more realistic, you will probably notice a decrease in anxiety and tension, and an increase in your ability to cope.

## 2021-05-21 ENCOUNTER — NURSE ONLY (OUTPATIENT)
Dept: LAB | Age: 51
End: 2021-05-21

## 2021-05-21 ENCOUNTER — OFFICE VISIT (OUTPATIENT)
Dept: PSYCHOLOGY | Age: 51
End: 2021-05-21
Payer: COMMERCIAL

## 2021-05-21 DIAGNOSIS — F43.23 ADJUSTMENT DISORDER WITH MIXED ANXIETY AND DEPRESSED MOOD: Primary | ICD-10-CM

## 2021-05-21 PROCEDURE — 1036F TOBACCO NON-USER: CPT | Performed by: PSYCHOLOGIST

## 2021-05-21 PROCEDURE — 90837 PSYTX W PT 60 MINUTES: CPT | Performed by: PSYCHOLOGIST

## 2021-05-21 NOTE — PROGRESS NOTES
Behavioral Health Consultation/Psychotherapy Note  Carmen Garcias. Javier Ramsey Psy.D. Visit Date:  2021    Patient:  Maribel De Jesus  YOB: 1970  Chief Complaint:  Follow-up, Depression, Anxiety, and Stress    Duration of session:  60 minutes      S:     Ct said her  Tammy Serrato) decided they needed to go to [facility] for Mother's Day to see his mom. She doesn't normally like doing things so quickly without time to prepare. Chau's mom Giovani Sarmiento) has terminal cancer, and his sister Javier Abdul) also has cancer and she's caring for her mom. Macy Landa is another one of Chau's sisters that lives in [facility]. Ct saw a  and it made her think about having to bury Micheal Mahoney.  This brought up a lot of emotions especially how his choices are out of her control, and how it's hard for her to not just be able to fix his situation. We processed thoughts and feelings about the situation. They should have Heriberto's genetic testing results next week, and they've had Germaine Becerril do predisposition testing on them as well to catch anything. O:    Appearance    Patient presents as alert, oriented, and cooperative, tearful with moderate distress  Appetite normal  Sleep disturbance Some  Loss of pleasure Some  Speech    clear and understandable  Mood    Depressed, Anxious  Emptiness  Affect    anxiety  Thought Process    linear and coherent  Insight    Good  Judgment    Intact  Memory    recent and remote memory intact  Suicide Assessment    no suicidal ideation      A:    1. Adjustment disorder with mixed anxiety and depressed mood        Ct needing supportive psychotherapy to address stress, anxiety, coping, through the use of mindfulness and relying on the strength of her nikhil.      P:    [address] check in. Meet again in 4 weeks. All questions about treatment plan answered. Patient instructed to go immediately to the emergency room and/or call 911 if any suicidal or homicidal ideations. Patient stated understanding and is agreeable to treatment and crisis plan.           Provider Signature:  Electronically signed by Caio Hatfield PSYD on 5/21/2021 at 9:56 AM

## 2021-06-25 ENCOUNTER — OFFICE VISIT (OUTPATIENT)
Dept: PSYCHOLOGY | Age: 51
End: 2021-06-25
Payer: COMMERCIAL

## 2021-06-25 DIAGNOSIS — F43.23 ADJUSTMENT DISORDER WITH MIXED ANXIETY AND DEPRESSED MOOD: Primary | ICD-10-CM

## 2021-06-25 PROCEDURE — 90837 PSYTX W PT 60 MINUTES: CPT | Performed by: PSYCHOLOGIST

## 2021-06-25 PROCEDURE — 1036F TOBACCO NON-USER: CPT | Performed by: PSYCHOLOGIST

## 2021-06-25 NOTE — PATIENT INSTRUCTIONS
Constructive Worry Worksheet  [employer]     1. ________________________                        2.  ______________________                        3.  _______________________     1. __________________________        2. __________________________        3. __________________________        1.  _________________________        2. __________________________        3. __________________________        1. __________________________        2. __________________________        3.  __________________________           Constructive Worry Instructions  When we have challenges, we tend to use our problem-solving skills to make our lives better and to relieve ourselves of anxiety. It is not surprising that some of us may use our problem-solving skills at the wrong time and place, namely bedtime. We may think about a problem, trying to solve it, but unfortunately this will oftentimes keep us awake. Constructive worry is a method for managing the tendency to worry during that quiet time when sleep is supposed to be taking over. Do this exercise early in the evening (at least 2 hours before bed). It should take only about 15 minutes to complete. Here is how it is done:  1. Write down the problem(s) facing you that has the greatest chance of keeping you awake a bedtime, and list them in the Concerns column of the [address]. 2. Then, think of the next step that might help fix it. Write it down in the Solutions column. This need not be the final solution to the problem, since most problems have to be solved by taking steps anyhow, and you will be doing this again tomorrow night and the night after until you finally get to the best solution.  If you know how to fix the problem completely, then write that down.  If you decide that this is not really a big problem, and you will just deal with it when the time comes, then write that down.    If you decide that you simply do not know what to do about it, and need to ask someone to help you, write that down.  If you decide that it is a problem, but there seems to be no good solution at all, and that you will just have to live with it, write that down, with a note to yourself that maybe sometime soon you or someone you speak with will give you a clue that will lead you to a solution. 3. Repeat this for any other concerns you have. 4. Fold the Constructive Worry Worksheet in half and place it on the nightstand next to your bed and forget about it until bedtime.       5. At bedtime, if you begin to worry actually tell yourself that you have dealt with your problems already in the best way you know how, and when you were at your problem-solving best.  Remind yourself that you will be working on them again tomorrow evening and that nothing you can do while you are so tired can help you any more than what you have already done; more effort will only make the matters worst.

## 2021-06-25 NOTE — PROGRESS NOTES
Behavioral Health Consultation/Psychotherapy Note  Mei Yazmin. Darien Castro Psy.D. Visit Date:  6/25/2021    Patient:  Wilfrid Gutiérrez  YOB: 1970  Chief Complaint:  Follow-up and Stress    Duration of session:  60 minutes      S:     Ct said there's been some rough weeks with Heriberto's treatment. She has felt like she's had to do so much for him, and hasn't had time for herself or anyone else. We talked about ways she could back off a little taking care of Jim Terrell so much, so that she could be more present for her family and herself. The family had a wedding in [place] that Dyllan and the other two kids attended, but since Jim Terrell got really sick and had to go to the ER, ct and Jim Terrell couldn't attend, which put a strain on their marriage. She said how Dyllan is on the extreme with some things as far as being too positive and she's often on the other extreme with being too negative. We processed thoughts and feelings about the situation. O:    Appearance    Patient presents as alert, oriented, and cooperative, tearful with moderate distress  Appetite normal  Sleep disturbance Some  Loss of pleasure Some  Speech    clear and understandable  Mood    Depressed, Anxious  Emptiness  Affect    anxiety  Thought Process    linear and coherent  Insight    Good  Judgment    Intact  Memory    recent and remote memory intact  Suicide Assessment    no suicidal ideation      A:    1. Adjustment disorder with mixed anxiety and depressed mood        Ct needing supportive psychotherapy to address stress, anxiety, coping, through the use of mindfulness and relying on the strength of her nikhil.      P:    Meet again in 2-4 weeks. All questions about treatment plan answered. Patient instructed to go immediately to the emergency room and/or call 911 if any suicidal or homicidal ideations. Patient stated understanding and is agreeable to treatment and crisis plan.           Provider Signature:  Electronically signed by Caio Hatfield PSYD on 6/25/2021 at 9:41 AM

## 2021-07-16 ENCOUNTER — OFFICE VISIT (OUTPATIENT)
Dept: PSYCHOLOGY | Age: 51
End: 2021-07-16
Payer: COMMERCIAL

## 2021-07-16 DIAGNOSIS — F43.23 ADJUSTMENT DISORDER WITH MIXED ANXIETY AND DEPRESSED MOOD: Primary | ICD-10-CM

## 2021-07-16 PROCEDURE — 90837 PSYTX W PT 60 MINUTES: CPT | Performed by: PSYCHOLOGIST

## 2021-07-16 PROCEDURE — 1036F TOBACCO NON-USER: CPT | Performed by: PSYCHOLOGIST

## 2021-08-06 ENCOUNTER — OFFICE VISIT (OUTPATIENT)
Dept: PSYCHOLOGY | Age: 51
End: 2021-08-06
Payer: COMMERCIAL

## 2021-08-06 DIAGNOSIS — F43.23 ADJUSTMENT DISORDER WITH MIXED ANXIETY AND DEPRESSED MOOD: Primary | ICD-10-CM

## 2021-08-06 PROCEDURE — 1036F TOBACCO NON-USER: CPT | Performed by: PSYCHOLOGIST

## 2021-08-06 PROCEDURE — 90837 PSYTX W PT 60 MINUTES: CPT | Performed by: PSYCHOLOGIST

## 2021-08-06 NOTE — PROGRESS NOTES
Behavioral Health Consultation/Psychotherapy Note  Kellee Harris. Demetrius Brock Psy.D. Visit Date:  8/6/2021    Patient:  Kelli Mccallum  YOB: 1970  Chief Complaint:  Follow-up, Depression, Anxiety, and Stress    Duration of session:  60 minutes      S:     Ct said her son has an MRI coming up which can help determine how to move forward. She still said it's been really tough riding the roller coaster and wanting to take the pain from [address] away. She wanted to talk about prayer and how she can stay positive for him. O:    Appearance    Patient presents as alert, oriented, and cooperative, tearful with moderate distress  Appetite normal  Sleep disturbance Some  Loss of pleasure Some  Speech    clear and understandable  Mood    Depressed, Anxious  Emptiness  Affect    anxiety  Thought Process    linear and coherent  Insight    Good  Judgment    Intact  Memory    recent and remote memory intact  Suicide Assessment    no suicidal ideation      A:    1. Adjustment disorder with mixed anxiety and depressed mood      Ct needing supportive psychotherapy to address stress, anxiety, coping, through the use of mindfulness and relying on the strength of her nikhil.        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 Meet again in 2-4 weeks. All questions about treatment plan answered. Patient instructed to go immediately to the emergency room and/or call 911 if any suicidal or homicidal ideations. Patient stated understanding and is agreeable to treatment and crisis plan.           Provider Signature:  Electronically signed by Lionel Palmer PSYD on 8/6/2021 at 8:41 AM

## 2021-08-27 ENCOUNTER — OFFICE VISIT (OUTPATIENT)
Dept: PSYCHOLOGY | Age: 51
End: 2021-08-27
Payer: COMMERCIAL

## 2021-08-27 DIAGNOSIS — F43.23 ADJUSTMENT DISORDER WITH MIXED ANXIETY AND DEPRESSED MOOD: Primary | ICD-10-CM

## 2021-08-27 PROCEDURE — 1036F TOBACCO NON-USER: CPT | Performed by: PSYCHOLOGIST

## 2021-08-27 PROCEDURE — 90837 PSYTX W PT 60 MINUTES: CPT | Performed by: PSYCHOLOGIST

## 2021-08-27 NOTE — PATIENT INSTRUCTIONS
It's really about \"I don't want to have to handle this,\" rather than \"I can't handle this. \"  Sometimes just saying this out loud is acceptance right there, once you're honest with yourself and God, now you can begin (with God's help) to move forward. Keep being yourself in your relationship with God. God wants all your good and all your bad.

## 2021-08-27 NOTE — PROGRESS NOTES
Behavioral Health Consultation/Psychotherapy Note  Jaun Villegas. Wade Gomez Psy.D. Visit Date:  8/27/2021    Patient:  Angela Joseph  YOB: 1970  Chief Complaint:  Follow-up, Anxiety, and Stress    Duration of session:  60 minutes      S:     Ct said from the MRI 85-90% of Heriberto's cancer was gone. This was much better than they could have hoped for. They will still do the 2nd round of treatment. They went to [facility] to be with Chau's mom who's in failing health. Heriberto received the sacrament of anointing of the sick when he was down in [place], and her grandma had it as well. Chau's mom is still sick, and may pass soon. We spent time taking perspective and how her Hinduism beliefs tie into what's going on.      O:    Appearance    Patient presents as alert, oriented, and cooperative, tearful with moderate distress  Appetite normal  Sleep disturbance Some  Loss of pleasure Some  Speech    clear and understandable  Mood    Depressed, Anxious  Emptiness  Affect    anxiety  Thought Process    linear and coherent  Insight    Good  Judgment    Intact  Memory    recent and remote memory intact  Suicide Assessment    no suicidal ideation      A:    1. Adjustment disorder with mixed anxiety and depressed mood        Ct needing supportive psychotherapy to address stress, anxiety, coping, through the use of mindfulness and relying on the strength of her nikhil.         P:    Meet again in 3 weeks. All questions about treatment plan answered. Patient instructed to go immediately to the emergency room and/or call 911 if any suicidal or homicidal ideations. Patient stated understanding and is agreeable to treatment and crisis plan.           Provider Signature:  Electronically signed by Cayla Hazel PSYD on 8/27/2021 at 9:49 AM

## 2021-09-17 ENCOUNTER — OFFICE VISIT (OUTPATIENT)
Dept: PSYCHOLOGY | Age: 51
End: 2021-09-17
Payer: COMMERCIAL

## 2021-09-17 DIAGNOSIS — F43.23 ADJUSTMENT DISORDER WITH MIXED ANXIETY AND DEPRESSED MOOD: Primary | ICD-10-CM

## 2021-09-17 PROCEDURE — 1036F TOBACCO NON-USER: CPT | Performed by: PSYCHOLOGIST

## 2021-09-17 PROCEDURE — 90837 PSYTX W PT 60 MINUTES: CPT | Performed by: PSYCHOLOGIST

## 2021-09-17 NOTE — PROGRESS NOTES
plan.          Provider Signature:  Electronically signed by Leobardo Mcclain PSYD on 9/17/2021 at 9:49 AM

## 2021-09-17 NOTE — PATIENT INSTRUCTIONS
Mindfulness Attitudes that provide foundation for healthier living  From the work of Silvia Busby, PhD, in [facility] 46    1. Non-Judging   Taking the stance of an impartial witness to your own experience.  Noticing the stream of judging mind . . good / bad / neutral not trying to  stop it but just being aware of it. 2. Patience   Letting things unfold in their own time   A child may try to help a butterfly emerge by breaking open a chrysalis but  chances are the butterfly wont benefit from this help.  Practising patience with ourselves. Why rush through some moments in  order to get to other better ones? Each one is your life in that moment. [de-identified]   Being completely open to each moment, accepting its fullness, knowing that  like the butterfly, things will emerge in their own time. 3. Beginners Mind   Too often we let our thinking and our beliefs about what we know stop us  from seeing things as they really are.  Cultivating a mind that is willing to see everything as if for the first time.  Being receptive to new possibilities not getting stuck in a rut of our own  expertise.  Each moment is unique and contains unique possibilities.  Try it with someone you know - next time, ask yourself if you are seeing  this person with fresh eyes, as he/she really is? Try it with problems with  the jazlyn with the dog with the man in the corner shop. 4. Trust   Developing a basic trust in yourself and your feelings.  Trusting in your own authority and intuition, even if you make some  mistakes along the way.  Honour your feelings. Taking responsibility for yourself and your own wellbeing. 5. Non-Striving   Meditation has no goal other than for you to be yourself. The irony is you  already are.  Paying attention to how you are right now - however that it is. Just watch.    The best way to achieve your own goals is to back off from striving and  instead start to really focus on carefully seeing and accepting things as they  are, moment by moment. With patience and regular practice, movement  towards your goals will take place by itself. 6. Acceptance   Seeing things as they actually are in the present. If you have a headache,  accept you have a headache. Meditation Maintenance: A Follow on Course © Tatiana Cervantes and Binta Powell   We often waste a lot of time and energy denying what is fact. We are trying  to force situations to how we would like them to be. This creates more  tension and prevents positive change occurring.  Now is the only time we have for anything. You have to accept yourself as  you are before you can really change.  Acceptance is not passive; it does not mean you have to like everything and  abandon your principles and values. It does not mean you have to be  resigned to tolerating things. It does not mean that you should stop trying to  break free of your own self-destructive habits or give up your desire to  change and grow.  Acceptance is a willingness to see things as they are. You are much more  likely to know what to do and have an inner conviction to act when you have  a clear picture of what is actually happening. 7. Letting Go  [facility] Letting go is a way of letting things be, of accepting things as they are.  We let things go and we just watch   If we find it particularly difficult to let go of something because it has such a  strong hold on our mind, we can direct our attention to what holding feels  like. Holding on is the opposite of letting go. Being willing to look at the  ways we hold on shows a lot about its opposite.  You already know how to let go Every night when we go to sleep we let go of our bodily sensations, thoughts, worries.

## 2021-10-01 ENCOUNTER — OFFICE VISIT (OUTPATIENT)
Dept: PSYCHOLOGY | Age: 51
End: 2021-10-01
Payer: COMMERCIAL

## 2021-10-01 DIAGNOSIS — F43.23 ADJUSTMENT DISORDER WITH MIXED ANXIETY AND DEPRESSED MOOD: Primary | ICD-10-CM

## 2021-10-01 PROCEDURE — 90837 PSYTX W PT 60 MINUTES: CPT | Performed by: PSYCHOLOGIST

## 2021-10-01 PROCEDURE — 1036F TOBACCO NON-USER: CPT | Performed by: PSYCHOLOGIST

## 2021-10-01 NOTE — PATIENT INSTRUCTIONS
Mindfulness Attitudes that provide foundation for healthier living  From the work of Oscar Chilel, PhD, in [facility] 46    1. Non-Judging   Taking the stance of an impartial witness to your own experience.  Noticing the stream of judging mind . . good / bad / neutral not trying to  stop it but just being aware of it. 2. Patience   Letting things unfold in their own time   A child may try to help a butterfly emerge by breaking open a chrysalis but  chances are the butterfly wont benefit from this help.  Practising patience with ourselves. Why rush through some moments in  order to get to other better ones? Each one is your life in that moment. [de-identified]   Being completely open to each moment, accepting its fullness, knowing that  like the butterfly, things will emerge in their own time. 3. Beginners Mind   Too often we let our thinking and our beliefs about what we know stop us  from seeing things as they really are.  Cultivating a mind that is willing to see everything as if for the first time.  Being receptive to new possibilities not getting stuck in a rut of our own  expertise.  Each moment is unique and contains unique possibilities.  Try it with someone you know - next time, ask yourself if you are seeing  this person with fresh eyes, as he/she really is? Try it with problems with  the jazlyn with the dog with the man in the corner shop. 4. Trust   Developing a basic trust in yourself and your feelings.  Trusting in your own authority and intuition, even if you make some  mistakes along the way.  Honour your feelings. Taking responsibility for yourself and your own wellbeing. 5. Non-Striving   Meditation has no goal other than for you to be yourself. The irony is you  already are.  Paying attention to how you are right now - however that it is. Just watch.    The best way to achieve your own goals is to back off from striving and  instead start to really focus on carefully seeing and accepting things as they  are, moment by moment. With patience and regular practice, movement  towards your goals will take place by itself. 6. Acceptance   Seeing things as they actually are in the present. If you have a headache,  accept you have a headache. Meditation Maintenance: A Follow on Course © David Mata and Lizette Lawrence   We often waste a lot of time and energy denying what is fact. We are trying  to force situations to how we would like them to be. This creates more  tension and prevents positive change occurring.  Now is the only time we have for anything. You have to accept yourself as  you are before you can really change.  Acceptance is not passive; it does not mean you have to like everything and  abandon your principles and values. It does not mean you have to be  resigned to tolerating things. It does not mean that you should stop trying to  break free of your own self-destructive habits or give up your desire to  change and grow.  Acceptance is a willingness to see things as they are. You are much more  likely to know what to do and have an inner conviction to act when you have  a clear picture of what is actually happening. 7. Letting Go  [facility] Letting go is a way of letting things be, of accepting things as they are.  We let things go and we just watch   If we find it particularly difficult to let go of something because it has such a  strong hold on our mind, we can direct our attention to what holding feels  like. Holding on is the opposite of letting go. Being willing to look at the  ways we hold on shows a lot about its opposite.  You already know how to let go Every night when we go to sleep we let go of our bodily sensations, thoughts, worries.

## 2021-10-01 NOTE — PROGRESS NOTES
Behavioral Health Consultation/Psychotherapy Note  Carmen Kraft. Meli Hernandez Psy.D. Visit Date:  10/1/2021    Patient:  Jan Chun  YOB: 1970  Chief Complaint:  Follow-up, Anxiety, and Stress    Duration of session:  60 minutes      S:     Ct said Claire Prado has had treatment pushed back to let his blood count come back up, but he's doing OK. She talked about how it gets exhausting going through this, these storms and doesn't see breaks. We leaned on her nikhil and put things into perspective based on that. We processed thoughts and feelings about the situation. O:    Appearance    Patient presents as alert, oriented, and cooperative, tearful with mild distress  Appetite normal  Sleep disturbance Some  Loss of pleasure Some  Speech    clear and understandable  Mood    Dysthymic  Affect    anxiety  Thought Process    linear and coherent  Insight    Good  Judgment    Intact  Memory    recent and remote memory intact  Suicide Assessment    no suicidal ideation      A:    1. Adjustment disorder with mixed anxiety and depressed mood        Ct needing supportive psychotherapy to address stress, anxiety, coping, through the use of mindfulness and relying on the strength of her nikhil.     P:    Meet again in 3 weeks. All questions about treatment plan answered. Patient instructed to go immediately to the emergency room and/or call 911 if any suicidal or homicidal ideations. Patient stated understanding and is agreeable to treatment and crisis plan.           Provider Signature:  Electronically signed by Angle Pickett PSYD on 10/1/2021 at 9:39 AM

## 2021-11-05 ENCOUNTER — OFFICE VISIT (OUTPATIENT)
Dept: PSYCHOLOGY | Age: 51
End: 2021-11-05
Payer: COMMERCIAL

## 2021-11-05 DIAGNOSIS — F43.23 ADJUSTMENT DISORDER WITH MIXED ANXIETY AND DEPRESSED MOOD: Primary | ICD-10-CM

## 2021-11-05 PROCEDURE — 90837 PSYTX W PT 60 MINUTES: CPT | Performed by: PSYCHOLOGIST

## 2021-11-05 PROCEDURE — 1036F TOBACCO NON-USER: CPT | Performed by: PSYCHOLOGIST

## 2021-12-10 ENCOUNTER — OFFICE VISIT (OUTPATIENT)
Dept: PSYCHOLOGY | Age: 51
End: 2021-12-10
Payer: COMMERCIAL

## 2021-12-10 DIAGNOSIS — F43.23 ADJUSTMENT DISORDER WITH MIXED ANXIETY AND DEPRESSED MOOD: Primary | ICD-10-CM

## 2021-12-10 PROCEDURE — 1036F TOBACCO NON-USER: CPT | Performed by: PSYCHOLOGIST

## 2021-12-10 PROCEDURE — 90837 PSYTX W PT 60 MINUTES: CPT | Performed by: PSYCHOLOGIST

## 2021-12-10 NOTE — PROGRESS NOTES
Behavioral Health Consultation/Psychotherapy Note  Hakeem Trujillo. Carlos Pickett Psy.D. Visit Date:  12/10/2021    Patient:  Emma Thomas  YOB: 1970  Chief Complaint:  Follow-up, Anxiety, and Stress    Duration of session:  55 minutes      S:     Ct said she had to cancel the last appt since she and the family got COVID-23. She said the entire family all got infusions. She said Jaxon Camejo has tests at the [facility] near the end of January. Her 's porn addiction really bothered her and we discussed this for a while. She talked about how she doesn't feel good enough or valued by him. She noted this has been a problem for him for a while now, and it's negative impact has been intermittently evident throughout the marriage. O:    Appearance    Patient presents as alert, oriented, and cooperative, tearful with mild distress  Appetite normal  Sleep disturbance Some  Loss of pleasure Some  Speech    clear and understandable  Mood    Dysthymic  Affect    anxiety  Thought Process    linear and coherent  Insight    Good  Judgment    Intact  Memory    recent and remote memory intact  Suicide Assessment    no suicidal ideation      A:    1. Adjustment disorder with mixed anxiety and depressed mood        Ct needing supportive psychotherapy to address stress, anxiety, coping, through the use of mindfulness and relying on the strength of her nikhil.     P:    Meet again in 2-4 weeks. All questions about treatment plan answered. Patient instructed to go immediately to the emergency room and/or call 911 if any suicidal or homicidal ideations. Patient stated understanding and is agreeable to treatment and crisis plan.           Provider Signature:  Electronically signed by Akhil Olivares PSYD on 12/10/2021 at 1:32 PM

## 2021-12-23 ENCOUNTER — VIRTUAL VISIT (OUTPATIENT)
Dept: PSYCHOLOGY | Age: 51
End: 2021-12-23
Payer: COMMERCIAL

## 2021-12-23 DIAGNOSIS — F43.23 ADJUSTMENT DISORDER WITH MIXED ANXIETY AND DEPRESSED MOOD: Primary | ICD-10-CM

## 2021-12-23 PROCEDURE — 90834 PSYTX W PT 45 MINUTES: CPT | Performed by: PSYCHOLOGIST

## 2021-12-23 NOTE — PROGRESS NOTES
Behavioral Health Consultation/Psychotherapy Note  Merced Hyde Psy.D. Visit Date:  12/23/2021    Patient:  Ric Calderón  YOB: 1970  Chief Complaint:  Follow-up, Depression, Anxiety, and Stress    Duration of session:  50 minutes      S:     This session was conducted as a telepsychology visit due to [facility] restrictions placed on in-person visits d/t the COVID-19 outbreak. Patient Location: Home       Provider Location (City/State): [facility], [place])    Patient gave verbal consent for teleservices and will sign a consent form when feasible. This virtual visit was conducted via interactive/real-time audio/video, using Sponsia me. Ct said Lockie Bosworth has one more treatment next week, and struggled with the last treatment he had. She said it's hard for her to focus on tasks, and then being interrupted, and trying to refocus. She has expectations about things that are not met. There's increased frustration from not being able to complete tasks from being interrupted by things that upset her day. We talked about some strategies for making this better. O:    Appearance    Patient presents as alert, oriented, and cooperative, tearful with mild distress  Appetite normal  Sleep disturbance Some  Loss of pleasure Some  Speech    clear and understandable  Mood    Dysthymic  Affect    anxiety  Thought Process    linear and coherent  Insight    Good  Judgment    Intact  Memory    recent and remote memory intact  Suicide Assessment    no suicidal ideation      A:    1. Adjustment disorder with mixed anxiety and depressed mood        Ct needing supportive psychotherapy to address stress, anxiety, coping, through the use of mindfulness and relying on the strength of her nikhil.     P:    Meet again in 2 weeks. Mindfulness Attitudes that provide foundation for healthier living  From the work of Jaguar Patel, PhD, [facility]    1.  Non-Judging   Taking the stance of an impartial witness to your own experience.  Noticing the stream of judging mind . . good / bad / neutral not trying to  stop it but just being aware of it. 2. Patience   Letting things unfold in their own time   A child may try to help a butterfly emerge by breaking open a chrysalis but  chances are the butterfly wont benefit from this help.  Practising patience with ourselves. Why rush through some moments in  order to get to other better ones? Each one is your life in that moment. [de-identified]   Being completely open to each moment, accepting its fullness, knowing that  like the butterfly, things will emerge in their own time. 3. Beginners Mind   Too often we let our thinking and our beliefs about what we know stop us  from seeing things as they really are.  Cultivating a mind that is willing to see everything as if for the first time.  Being receptive to new possibilities not getting stuck in a rut of our own  expertise.  Each moment is unique and contains unique possibilities.  Try it with someone you know  next time, ask yourself if you are seeing  this person with fresh eyes, as he/she really is? Try it with problems with  the jazlyn with the dog with the man in the corner shop. 4. Trust   Developing a basic trust in yourself and your feelings.  Trusting in your own authority and intuition, even if you make some  mistakes along the way.  Honour your feelings. Taking responsibility for yourself and your own wellbeing. 5. Non-Striving   Meditation has no goal other than for you to be yourself. The irony is you  already are.  Paying attention to how you are right now  however that it is. Just watch.  The best way to achieve your own goals is to back off from striving and  instead start to really focus on carefully seeing and accepting things as they  are, moment by moment. With patience and regular practice, movement  towards your goals will take place by itself.     6. Acceptance   Seeing things as they actually are in the present. If you have a headache,  accept you have a headache. Meditation Maintenance: A Follow on Course © Fahad Spencer   We often waste a lot of time and energy denying what is fact. We are trying  to force situations to how we would like them to be. This creates more  tension and prevents positive change occurring.  Now is the only time we have for anything. You have to accept yourself as  you are before you can really change.  Acceptance is not passive; it does not mean you have to like everything and  abandon your principles and values. It does not mean you have to be  resigned to tolerating things. It does not mean that you should stop trying to  break free of your own self-destructive habits or give up your desire to  change and grow.  Acceptance is a willingness to see things as they are. You are much more  likely to know what to do and have an inner conviction to act when you have  a clear picture of what is actually happening. 7. Letting Go  [facility] Letting go is a way of letting things be, of accepting things as they are.  We let things go and we just watch   If we find it particularly difficult to let go of something because it has such a  strong hold on our mind, we can direct our attention to what holding feels  like. Holding on is the opposite of letting go. Being willing to look at the  ways we hold on shows a lot about its opposite.  You already know how to let go Every night when we go to sleep we let go of our bodily sensations, thoughts, worries. 7 tips for becoming more mindful    Meditation is one form of Mindfulness but there is so much more. While it is true that meditation is a form of mindfulness, it is only one option on the mindfulness menu. Mindfulness means, simply put, the active state of being fully conscious and aware.  One common mindfulness exercise involves letting yourself becoming fully conscious and aware of your own breath, which is a great way to to focus you right into the current moment. You can also focus your mindfulness practice on cultivating an open, present state of mind during interactions with other people at home, at work and at play. Get into alpha-wave music. Google alpha-wave music and give it a listen. This is music meant to simulate your brains  yes, you guessed it  alpha waves, which are associated with states of relaxation and focus. I recommend listening to alpha-wave music for 10 minutes before bed, which can help reduce depression, improve creativity and enable a deeper more restful sleep. Think about how to implement mindfulness during your daily routines. The beauty of mindfulness is that you can bring it to whatever activity you are engaged in, whatever mood you are feeling, whether alone or with others in a social circumstance Mindfulness is not just for the quiet, controlled, pleasant moments you spend by yourself. The practice will really start to make a difference in your life when you let it into your agitated moments, those eye rolling hair curling moments of stress that may be uncomfortable or awkward moments during the daily grind. A good place to start: when you are walking outside, focus on your surroundings. What can you see, hear, smell and feel? When you are taking a shower, let yourself fully feel the water running on your skin, the steam warming your lungs. All questions about treatment plan answered. Patient instructed to go immediately to the emergency room and/or call 911 if any suicidal or homicidal ideations. Patient stated understanding and is agreeable to treatment and crisis plan.           Provider Signature:  Electronically signed by Mohinder Saez PSYD on 12/23/2021 at 10:26 AM

## 2021-12-23 NOTE — PATIENT INSTRUCTIONS
on carefully seeing and accepting things as they  are, moment by moment. With patience and regular practice, movement  towards your goals will take place by itself. 6. Acceptance   Seeing things as they actually are in the present. If you have a headache,  accept you have a headache. Meditation Maintenance: A Follow on Course © Yovany Montoya and Adolph Simmons   We often waste a lot of time and energy denying what is fact. We are trying  to force situations to how we would like them to be. This creates more  tension and prevents positive change occurring.  Now is the only time we have for anything. You have to accept yourself as  you are before you can really change.  Acceptance is not passive; it does not mean you have to like everything and  abandon your principles and values. It does not mean you have to be  resigned to tolerating things. It does not mean that you should stop trying to  break free of your own self-destructive habits or give up your desire to  change and grow.  Acceptance is a willingness to see things as they are. You are much more  likely to know what to do and have an inner conviction to act when you have  a clear picture of what is actually happening. 7. Letting Go  Dulce Reece Letting go is a way of letting things be, of accepting things as they are.  We let things go and we just watch   If we find it particularly difficult to let go of something because it has such a  strong hold on our mind, we can direct our attention to what holding feels  like. Holding on is the opposite of letting go. Being willing to look at the  ways we hold on shows a lot about its opposite.  You already know how to let go Every night when we go to sleep we let go of our bodily sensations, thoughts, worries. 7 tips for becoming more mindful    Meditation is one form of Mindfulness but there is so much more.  While it is true that meditation is a form of mindfulness, it is only one option on the mindfulness menu. Mindfulness means, simply put, the active state of being fully conscious and aware. One common mindfulness exercise involves letting yourself becoming fully conscious and aware of your own breath, which is a great way to to focus you right into the current moment. You can also focus your mindfulness practice on cultivating an open, present state of mind during interactions with other people at home, at work and at play. Get into alpha-wave music. Google alpha-wave music and give it a listen. This is music meant to simulate your brains  yes, you guessed it  alpha waves, which are associated with states of relaxation and focus. I recommend listening to alpha-wave music for 10 minutes before bed, which can help reduce depression, improve creativity and enable a deeper more restful sleep. Think about how to implement mindfulness during your daily routines. The beauty of mindfulness is that you can bring it to whatever activity you are engaged in, whatever mood you are feeling, whether alone or with others in a social circumstance Mindfulness is not just for the quiet, controlled, pleasant moments you spend by yourself. The practice will really start to make a difference in your life when you let it into your agitated moments, those eye rolling hair curling moments of stress that may be uncomfortable or awkward moments during the daily grind. A good place to start: when you are walking outside, focus on your surroundings. What can you see, hear, smell and feel? When you are taking a shower, let yourself fully feel the water running on your skin, the steam warming your lungs. Eat mindfully. Listen to what your body is saying to you. Mindful eating is about eating the amount your need not over filling, eat foods that are nutritionally healthy; and consider where your food comes from as you eat it.  Try to imagine the people involved in the various steps that it took to bring this food to your plate. This mindful eating practice can have both mental and physical benefits. Sleep mindfully. Make sure you get enough sleep, and prioritize getting good-quality sleep. There are smartphone apps out there that can help offer insight into your sleep quality. If you change your practices and lengthen your night of sleep, you can notice a major bearing on the rest of your day. Keep it short. This is not a practice of many hours. The mind works best in [employer] and you can apply this to your mindfulness techniques! Learn to meditate. I know I said earlier that mindfulness does not necessarily equate meditation, and that is still true. Of course, it is also the case that learning deep meditation techniques can help you practice mindfulness more easily, and train your brain to be more efficient. And who doesnt want to pursue the possibility of a more efficient, rested, steady, and responsive mind?

## 2022-02-23 ENCOUNTER — OFFICE VISIT (OUTPATIENT)
Dept: PSYCHOLOGY | Age: 52
End: 2022-02-23
Payer: COMMERCIAL

## 2022-02-23 DIAGNOSIS — F43.23 ADJUSTMENT DISORDER WITH MIXED ANXIETY AND DEPRESSED MOOD: Primary | ICD-10-CM

## 2022-02-23 PROCEDURE — 90837 PSYTX W PT 60 MINUTES: CPT | Performed by: PSYCHOLOGIST

## 2022-03-16 ENCOUNTER — PATIENT MESSAGE (OUTPATIENT)
Dept: FAMILY MEDICINE CLINIC | Age: 52
End: 2022-03-16

## 2022-03-16 DIAGNOSIS — F32.A DEPRESSIVE DISORDER: ICD-10-CM

## 2022-03-16 DIAGNOSIS — F41.1 GENERALIZED ANXIETY DISORDER: ICD-10-CM

## 2022-03-16 DIAGNOSIS — E28.2 POLYCYSTIC OVARIES: ICD-10-CM

## 2022-03-16 DIAGNOSIS — E78.9 LIPID DISORDER: ICD-10-CM

## 2022-03-16 DIAGNOSIS — N80.9 ENDOMETRIOSIS: ICD-10-CM

## 2022-03-16 DIAGNOSIS — G47.00 INSOMNIA, UNSPECIFIED TYPE: ICD-10-CM

## 2022-03-16 DIAGNOSIS — K90.0 CELIAC DISEASE: ICD-10-CM

## 2022-03-16 DIAGNOSIS — E03.8 OTHER SPECIFIED HYPOTHYROIDISM: ICD-10-CM

## 2022-03-16 DIAGNOSIS — F41.9 ANXIETY: Primary | ICD-10-CM

## 2022-03-30 NOTE — TELEPHONE ENCOUNTER
From: Prince Wilson  Sent: 3/30/2022 4:30 PM EDT  To: [facility]  Subject: UP COMING APPOINTMENT    Good Afternoon,   For my upcoming visit with Dr Carlos Eduardo Hannah on April 13, could you tell me if the orders for my labs are already at the lab at [address] [place] [address] so that I can just stop by the lab and get those drawn? Thanks so much!   Millie Adame

## 2022-04-05 ENCOUNTER — NURSE ONLY (OUTPATIENT)
Dept: LAB | Age: 52
End: 2022-04-05

## 2022-04-05 DIAGNOSIS — G47.00 INSOMNIA, UNSPECIFIED TYPE: ICD-10-CM

## 2022-04-05 DIAGNOSIS — F32.A DEPRESSIVE DISORDER: ICD-10-CM

## 2022-04-05 DIAGNOSIS — F41.1 GENERALIZED ANXIETY DISORDER: ICD-10-CM

## 2022-04-05 DIAGNOSIS — K90.0 CELIAC DISEASE: ICD-10-CM

## 2022-04-05 DIAGNOSIS — E28.2 POLYCYSTIC OVARIES: ICD-10-CM

## 2022-04-05 DIAGNOSIS — E03.8 OTHER SPECIFIED HYPOTHYROIDISM: ICD-10-CM

## 2022-04-05 DIAGNOSIS — E78.9 LIPID DISORDER: ICD-10-CM

## 2022-04-05 DIAGNOSIS — F41.9 ANXIETY: ICD-10-CM

## 2022-04-05 DIAGNOSIS — N80.9 ENDOMETRIOSIS: ICD-10-CM

## 2022-04-05 LAB
ALBUMIN SERPL-MCNC: 4.5 G/DL (ref 3.5–5.1)
ALP BLD-CCNC: 52 U/L (ref 38–126)
ALT SERPL-CCNC: 19 U/L (ref 11–66)
AMYLASE: 35 U/L (ref 20–104)
ANION GAP SERPL CALCULATED.3IONS-SCNC: 12 MEQ/L (ref 8–16)
AST SERPL-CCNC: 22 U/L (ref 5–40)
BASOPHILS # BLD: 0.6 %
BASOPHILS ABSOLUTE: 0 THOU/MM3 (ref 0–0.1)
BILIRUB SERPL-MCNC: 0.4 MG/DL (ref 0.3–1.2)
BILIRUBIN DIRECT: < 0.2 MG/DL (ref 0–0.3)
BUN BLDV-MCNC: 20 MG/DL (ref 7–22)
CALCIUM SERPL-MCNC: 9.6 MG/DL (ref 8.5–10.5)
CHLORIDE BLD-SCNC: 102 MEQ/L (ref 98–111)
CHOLESTEROL, TOTAL: 195 MG/DL (ref 100–199)
CO2: 25 MEQ/L (ref 23–33)
CREAT SERPL-MCNC: 0.6 MG/DL (ref 0.4–1.2)
EOSINOPHIL # BLD: 0.3 %
EOSINOPHILS ABSOLUTE: 0 THOU/MM3 (ref 0–0.4)
ERYTHROCYTE [DISTWIDTH] IN BLOOD BY AUTOMATED COUNT: 14.1 % (ref 11.5–14.5)
ERYTHROCYTE [DISTWIDTH] IN BLOOD BY AUTOMATED COUNT: 45.1 FL (ref 35–45)
GFR SERPL CREATININE-BSD FRML MDRD: > 90 ML/MIN/1.73M2
GLUCOSE BLD-MCNC: 91 MG/DL (ref 70–108)
HCT VFR BLD CALC: 39.7 % (ref 37–47)
HDLC SERPL-MCNC: 88 MG/DL
HEMOGLOBIN: 12.7 GM/DL (ref 12–16)
IMMATURE GRANS (ABS): 0.02 THOU/MM3 (ref 0–0.07)
IMMATURE GRANULOCYTES: 0.3 %
LDL CHOLESTEROL CALCULATED: 97 MG/DL
LIPASE: 16 U/L (ref 5.6–51.3)
LYMPHOCYTES # BLD: 21 %
LYMPHOCYTES ABSOLUTE: 1.6 THOU/MM3 (ref 1–4.8)
MAGNESIUM: 2.1 MG/DL (ref 1.6–2.4)
MCH RBC QN AUTO: 28.2 PG (ref 26–33)
MCHC RBC AUTO-ENTMCNC: 32 GM/DL (ref 32.2–35.5)
MCV RBC AUTO: 88.2 FL (ref 81–99)
MONOCYTES # BLD: 5.8 %
MONOCYTES ABSOLUTE: 0.4 THOU/MM3 (ref 0.4–1.3)
NUCLEATED RED BLOOD CELLS: 0 /100 WBC
PLATELET # BLD: 253 THOU/MM3 (ref 130–400)
PMV BLD AUTO: 8.9 FL (ref 9.4–12.4)
POTASSIUM SERPL-SCNC: 3.9 MEQ/L (ref 3.5–5.2)
PTH INTACT: 33.8 PG/ML (ref 15–65)
RBC # BLD: 4.5 MILL/MM3 (ref 4.2–5.4)
RHEUMATOID FACTOR: 11 IU/ML (ref 0–13)
SEDIMENTATION RATE, ERYTHROCYTE: 3 MM/HR (ref 0–20)
SEG NEUTROPHILS: 72 %
SEGMENTED NEUTROPHILS ABSOLUTE COUNT: 5.5 THOU/MM3 (ref 1.8–7.7)
SODIUM BLD-SCNC: 139 MEQ/L (ref 135–145)
T4 FREE: 1.19 NG/DL (ref 0.93–1.76)
TOTAL PROTEIN: 7 G/DL (ref 6.1–8)
TRIGL SERPL-MCNC: 49 MG/DL (ref 0–199)
TSH SERPL DL<=0.05 MIU/L-ACNC: 2.09 UIU/ML (ref 0.4–4.2)
VITAMIN D 25-HYDROXY: 51 NG/ML (ref 30–100)
WBC # BLD: 7.7 THOU/MM3 (ref 4.8–10.8)

## 2022-04-06 ENCOUNTER — OFFICE VISIT (OUTPATIENT)
Dept: PSYCHOLOGY | Age: 52
End: 2022-04-06
Payer: COMMERCIAL

## 2022-04-06 DIAGNOSIS — F43.23 ADJUSTMENT DISORDER WITH MIXED ANXIETY AND DEPRESSED MOOD: Primary | ICD-10-CM

## 2022-04-06 LAB
C-REACTIVE PROTEIN, HIGH SENSITIVITY: 2.2 MG/L
DHEAS (DHEA SULFATE): 244 UG/DL (ref 26–200)
ESTRADIOL LEVEL: 327.5 PG/ML (ref 27–314)
FOLLICLE STIMULATING HORMONE: 13.4 MIU/ML (ref 1.7–21.5)
GLIADIN PEPTIDE IGG: 0.9 U/ML
HOMOCYSTEINE: 7.3 UMOL/L
LUTEINIZING HORMONE: 81.5 MIU/ML (ref 1–95.6)
T3 FREE: 3.11 PG/ML (ref 2.02–4.43)
T3 TOTAL: 94 NG/DL (ref 60–181)
THYROXINE (T4): 5.4 UG/DL (ref 4.5–10.9)
TISSUE TRANSGLUTAMINASE IGA: < 0.1 U/ML

## 2022-04-06 PROCEDURE — 90837 PSYTX W PT 60 MINUTES: CPT | Performed by: PSYCHOLOGIST

## 2022-04-06 NOTE — PROGRESS NOTES
Behavioral Health Consultation/Psychotherapy Note  Shantelle Rossi Psy.D. Visit Date:  4/6/2022    Patient:  Lexi Vuong  YOB: 1970  Chief Complaint:  Follow-up, Depression, Anxiety, and Stress    Duration of session:  60 minutes      S:     Ct said Heriberto's surgery went well, was in hospital for 7 days. After he got back home things got worse, reactions to meds, changes in appetite, etc.  Ct thinks his poor attitude contributes to his lack of recovery. We had a great, in-depth discussion about nikhil. She talked about crying at pain management reception about being told Heriberto's appt would have to be r/s b/c they were late, and another patient compassionately giving up her spot so he could be seen. She talked about being emotional with a nurse at [facility] when asked how she was doing, then being able to hear God's voice through her. We processed these experiences and talked about the role of giving up control, acceptance for the situation and continuing to be open to [employer]. O:    Appearance    Patient presents as alert, oriented, and cooperative, tearful with mild distress  Appetite normal  Sleep disturbance Some  Loss of pleasure Some  Speech    clear and understandable  Mood    Dysthymic  Affect    anxiety  Thought Process    linear and coherent  Insight    Good  Judgment    Intact  Memory    recent and remote memory intact  Suicide Assessment    no suicidal ideation      A:    1. Adjustment disorder with mixed anxiety and depressed mood        Ct needing supportive psychotherapy to address stress, anxiety, coping, through the use of mindfulness and relying on the strength of her nikhil.     P:    Meet again in 2-4 weeks. All questions about treatment plan answered. Patient instructed to go immediately to the emergency room and/or call 911 if any suicidal or homicidal ideations.  Patient stated understanding and is agreeable to treatment and crisis plan.          Provider Signature:  Electronically signed by Susan Coleman PSYD on 4/6/2022 at 11:38 AM

## 2022-04-07 LAB — THYROID PEROXIDASE ANTIBODY: < 4 IU/ML (ref 0–25)

## 2022-04-13 ENCOUNTER — OFFICE VISIT (OUTPATIENT)
Dept: FAMILY MEDICINE CLINIC | Age: 52
End: 2022-04-13
Payer: COMMERCIAL

## 2022-04-13 VITALS
WEIGHT: 143 LBS | TEMPERATURE: 98.3 F | OXYGEN SATURATION: 99 % | DIASTOLIC BLOOD PRESSURE: 62 MMHG | SYSTOLIC BLOOD PRESSURE: 102 MMHG | RESPIRATION RATE: 12 BRPM | BODY MASS INDEX: 21.18 KG/M2 | HEIGHT: 69 IN | HEART RATE: 50 BPM

## 2022-04-13 DIAGNOSIS — E03.8 HYPOTHYROIDISM DUE TO HASHIMOTO'S THYROIDITIS: ICD-10-CM

## 2022-04-13 DIAGNOSIS — E06.3 HYPOTHYROIDISM DUE TO HASHIMOTO'S THYROIDITIS: ICD-10-CM

## 2022-04-13 DIAGNOSIS — F41.1 GENERALIZED ANXIETY DISORDER: ICD-10-CM

## 2022-04-13 DIAGNOSIS — F41.9 ANXIETY: Primary | ICD-10-CM

## 2022-04-13 DIAGNOSIS — J45.20 MILD INTERMITTENT ASTHMA WITHOUT COMPLICATION: ICD-10-CM

## 2022-04-13 PROBLEM — N64.4 BREAST PAIN IN FEMALE: Status: ACTIVE | Noted: 2021-06-23

## 2022-04-13 PROBLEM — N92.4 ABNORMAL PERIMENOPAUSAL BLEEDING: Status: ACTIVE | Noted: 2020-07-16

## 2022-04-13 PROBLEM — N94.3 PREMENSTRUAL SYNDROME: Status: ACTIVE | Noted: 2020-01-22

## 2022-04-13 LAB
DHEA UNCONJUGATED: 3.28 NG/ML (ref 0.63–4.7)
TESTOSTERONE, FREE W SHGB, FEMALES/CHILDREN: NORMAL

## 2022-04-13 PROCEDURE — 99215 OFFICE O/P EST HI 40 MIN: CPT | Performed by: FAMILY MEDICINE

## 2022-04-13 NOTE — PROGRESS NOTES
[address] [facility]. [address] [facility] [address]  Dept: [phone]  Dept Fax: [phone]  Loc: [phone]      Padilla Tierney is a 49 y.o. White female. Rosa Martinez  presents to the [place] [address] clinic today for   Chief Complaint   Patient presents with    Follow-up    Discuss Labs   , and;   No diagnosis found. I have reviewed Padilla Tierney medical, surgical and other pertinent history in detail, and have updated medication and allergy information in the computerized patient record. Clinical Care Team:     -Referring Provider for today's consult: self  -Primary Care Provider: King Moore DO    Medical/Surgical History:   She  has a past medical history of Celiac disease and Hypothyroidism. Her  has a past surgical history that includes Upper gastrointestinal endoscopy (November 2013,2021); Colonoscopy (July 2013,2021); Appendectomy (2000); Anus surgery (2000); and other surgical history. Family/Social History:     Her family history includes Breast Cancer in her maternal aunt; Cervical Cancer in her maternal grandmother; Colon Cancer in her paternal aunt; Colon Polyps in her father; Esophageal Cancer in her maternal grandmother; High Cholesterol in her father; Mer Liter in her maternal grandmother; Rectal Cancer in her child; Uterine Cancer in her maternal aunt. She  reports that she has never smoked. She has never used smokeless tobacco. She reports that she does not drink alcohol and does not use drugs.     Medications/Allergies/Immunizations:     Her current medication(s) include   Current Outpatient Medications:     Cholecalciferol (VITAMIN D3) 25 MCG (1000 UT) CAPS, Take 2 capsules by mouth daily , Disp: , Rfl:     clindamycin (CLEOCIN T) 1 % external solution, Apply topically as needed, Disp: , Rfl: 3    Lysine 500 MG TABS, Take 500 mg by mouth 4 times daily, Disp: , Rfl:     Ascorbic Acid (VITAMIN C) 1000 MG tablet, Take 1,000 mg by mouth 4 times daily , Disp: , Rfl:     Menaquinone-7 (VITAMIN K2) 100 MCG CAPS, Take 1 capsule by mouth 2 times daily, Disp: , Rfl:     TAZORAC 0.1 % cream, Apply as needed nightly, Disp: , Rfl:     NONFORMULARY, Progesterone compounded capsule- 1 capsule daily on last half of cycle, Disp: , Rfl:     Specialty Vitamins Products (MAGNESIUM, AMINO ACID CHELATE,) 133 MG tablet, Take 1 tablet by mouth 4 times daily. , Disp: , Rfl:     ferrous sulfate 325 (65 FE) MG tablet, Take 325 mg by mouth nightly., Disp: , Rfl:     Omega-3 1400 MG CAPS, Take 2 capsules by mouth 6 times daily CVS  Or NOW1 before and as finish meals and at bedtime, Disp: , Rfl:     Cinnamon 500 MG CAPS, Take 1 capsule by mouth 4 times daily (before meals and nightly). , Disp: , Rfl:     Magnesium Citrate 100 MG TABS, Take 3 tablets by mouth daily , Disp: , Rfl:     THYROID PO, Compound Thyroid 40 mcg/12 mcg Take 1 capsule by mouth daily, Disp: , Rfl:     DHEA 10 MG TABS, Take 15 mg by mouth daily Alternate 10 mg and 20 mg, Disp: , Rfl:     QUERCETIN PO, Take 1 capsule by mouth daily as needed (allergies). , Disp: , Rfl:     B Complex Vitamins (VITAMIN B COMPLEX) TABS, Take 1 tablet by mouth 3 times daily (before meals) [employer], or natures made, Disp: , Rfl:   Allergies: Gluten meal  Immunizations:   Immunization History   Administered Date(s) Administered    Tdap (Boostrix, Adacel) 10/14/2010        History of Present Illness:     Irina's had concerns including Follow-up and Discuss Labs. Nicole Lomeli  presents to the [address] [place] [address] today for;   Chief Complaint   Patient presents with    Follow-up   [facility]   , abnormal labs follow up and these conditions as she  Is looking today for:     No diagnosis found. HPI    Subjective:     Review of Systems   All other systems reviewed and are negative.       Objective:     /62 (Site: Right Upper Arm, Position: Sitting, Cuff Size: Medium Adult)   Pulse 50   Temp 98.3 °F (36.8 °C) (Oral)   Resp 12   Ht 5' 9\" (1.753 m)   Wt 143 lb (64.9 kg)   SpO2 99%   BMI 21.12 kg/m²   Physical Exam  Vitals and nursing note reviewed. Constitutional:       Appearance: Normal appearance. HENT:      Head: Normocephalic. Pulmonary:      Effort: Pulmonary effort is normal.   Neurological:      Mental Status: She is alert. Psychiatric:         Mood and Affect: Mood normal.         Thought Content: Thought content normal.            Laboratory Data:   Lab results were searched in Care Everywhere and/or those brought by the pateint were reviewed today with Yesenia Govea and she has a copy of their most recent labs to take home with them as noted below;       Imaging Data:   Imaging Data:       Assessment & Plan:       Impression:  No diagnosis found. Assessment and Plan:  After reviewing the patients chief complaints, reviewing their labfindings in great detail (with the patient and those accompanying them) which correlate to their chief complaints, symptoms, and or medical conditions; suggestions were made relating to changes in diet and or supplements which may improve the complaints and which will be reflected in their future lab findings; Chief Complaint   Patient presents with   [address] [place] [address]   ;    Plans for the next visits:  - Abnormal and non-optimal Labs were ordered today to be repeated in the next 120-365 days to assess changes from adjustments in nutrition and or nutrients.    - Patient instructed when having a blood draw to ask the  to divide their lab draws into multiple draws over several days if not feeling good at the time of the lab draw or if either prefers to do several smaller blood draws over several days  -Patient instructed to check with insurer before each lab draw and to go to the lab which the insurer directs them for the most cost effective lab draw with the least patient's cost  - Donna Larsen  will be scheduled subsequent to those results. Ilana Jane will bring in her drink, food, supplement log to her next visit    Chronic Problems Addressed on this Visit:                                   1.  Intensity of Service; Uncontrolled items at this visit; Chief Complaint   Patient presents with   [address]   ; Improved items at this visit and Stable items were discussed at this visit;  2. Patients food, drinks, supplements and symptoms were reviewed with the patient,       - Donna Larsen will bring food, drink, supplements and symptoms log to next visit for inclusion in their record      - 75 better food list reviewed & given to patient with the omega 6 food list to avoid      - The 52 Latex foods list was reviewed and given to the patients with the information on carrageenan         - Gluten in corn and oats abstracts sheet reviewed and given to the patient today   3. Greater than 40 minutes time was spent with the patient face to face on this visit; of which >50% was for counseling and coordination of care, as well as the time spent before and after the visit reviewing the chart, documenting the encounter, reviewing labs,reports, NIH listed studies, making phone calls, etc.      Patients food and drinks were reviewed with the patient,   - They will bring a food drink symptom log to future visits for inclusion in their record    - 75 better food list reviewed & given to patient along with the omega 6 food list to avoid      - Gluten in corn and oats abstracts sheet reviewed and given to the patient today    - 23 Foods containing Latex-like proteins was reviewed and copy to be taken if desired     - Nutrient Supplements list provided and copyto be taken if desired    - Vertigo. ecoInsight web site offered to patient to review at their convenience by staff with login information    Note:  I have discussed with the patient that with all nutraceuticals, there is often mixed data and emerging research which needs to be monitored; as well as an array of NIH fact sheets on nutrients and supplements, available at www.nih,issue plus VSporto. [employer] plus www.[employer]i,org. If I have recommended cinnamon at the request of this patient to assist them in control of their blood sugar, triglyceride, and/or weight issues. I discussed that the patient's clinical use of cinnamon bark, calcium, magnesium, Vitamin D, and pharmaceutical grade CVS omega 3 oil or triple-strength fish oil, and B-50/B-100 time-released B-complex by [address] [facility] will be for a time-limited trial to determine their individual effectiveness and safety in this patient. I also referred the patient to the NMCD: Nutrition, Metabolism, and Cardiovascular Diseases (SecuritiesCard.pl) and concerns about long-term use and hepatotoxicity of cinnamon and other nutrients. I suggested they frequently search nih.gov for the latest non-proprietary information on nutriceuticals as well as consider a subscription to iTwin for details on reviewed supplements, or at the least review the nutrient files at [facility] at [facility], [address] bark, an insulin mimetic, reduces some High Carbohydrate Dietary Impacts. Methylhydroxychalcone polymers insulin-enhancing properties in fat cells are responsible for enhanced glucose uptake, inhibiting hepatic HMG-CoA reductase and lowers lipids. www.jacn. org/content/20/4/327.full     But cinnamon with additives such as Cinnamon Extract are not effective as insulin mimetics.  :eStoreDirectory.at     Nutrients for Start up from [place] or [place] for ease to get started now;  Keyur Desai has some useable products;  - Triple Strength Fish Oil, enteric coated  - Vit D-3 5000 IU gel caps  - Iron ferrous sulfate 325 mg tabs  - Centrum Silver look-a-like for most patients, or  - Centrum plain look-a-like if need iron    Local pharmacies or chains such as [employer], [employer], have:  - CVS pharmaceutical grade omega 3 is 90% EPA/DHA whereas most Triple strength fish oil are 75% EPA/DHA  - Triple Strength Fish Oil (enteric coated if available) or if not enteric coated, can take from freezer for less burps  - B-50 or B-100 released balanced B complex tabs by [id number] [facility] bark 500 mg (without Chromium or extracts)   some brands list 1000 mg / serving of 2 capsules,    some brands have 1000 mg caps with the undesireable chromium extract  - Calcium carbonate/citrate, magnesium oxide/citrate, Vit D-3 as 3-4 tabs/caps/serving     Some Local Brands may contain Zinc which is acceptable for the first bottle or two  - Magnesium oxide 250 mg tabs for those having < 2 bowel movements daily  - Magnesium citrate 200 mg if having > 2 bowel movements/day  - Centrum Silver or look-a-like for most patients, Centrum plain or look-a-like with iron  - Vitamin D-3 comes as 1,000 IU or 2,000 IU or 5,000 IU gel caps or Liquid drops but keep Vitamin D levels <50 but >40     Some brands containing or derived from soy oil or corn oil are OK if not allergic to soy  - Elemental Iron 65 mg tabs at bedtime is available over the counter if need more iron     Usually turns bowel movements grey, green, or black but not a concern  - Apricot Kernel Oil (by Now) for dry skin sensitive perineal or perianal area skin    Nutrients for ongoing use by Mail order for less expense from [employer]. [employer] ;  - Strength Fish Oil , 240 Softgels Item [id number]  -B-100 time released balanced B complex Item #[id number]  - Cinnamon bark 500 mg without Chromium or extract Item #[id number]  - Calcium carbonate 1000 mg, Magnesium oxide 500 mg, Vit D-3 400 IU Item #[id number]  - Magnesium oxide 500 mg tabs Item #[id number] if less than 2 bowel movements daily  - ABC Seniors Item #[id number] for most patients, One Daily Item #[id number] with Latex Allergy? Eating foods with latex-like protein exposes us to latex allergies. Our body cannot tell the differencebetween these latex-like proteins and latex from rubber products since many people are allergic to fruit, vegetables and latex. Read labels on pre-packaged foods. This list to avoid is only a guide if you are known allergicto latex or have a latex rash on your chin, cheeks and lines on your neck and chest. The amount of latex is different in each food product or fruit variety. Avoid out of Season if not grown locally:   Melon, Nectarine, Papaya, Cherry, Passion fruit, Plum, Chestnuts, and Tomato. Avocado, Banana, Celery, Figs, and Kiwi always contain Latex-like protein. Whats in Season? Strawberries taste better in June than December because June is strawberry season so buy locally grown produce \"in season\" for the best flavor, cost, and less Latex. Locally grown produce not only tastes great but also requires little or no ethylene exposure in food distribution so has less latex content. Out of season: use canned, frozen, or dried since those are processed ripe and latex content is lower!!!     Month     [place] Locally Grown Produce  January, February, March: use canned, frozen or dried fruits since lower in latex  April: asparagus, radishes  May: asparagus, broccoli, green onions, greens, peas, radishes, rhubarb  Addie: asparagus, beets, beans, broccoli, cabbage, cantaloupe, carrots, green onions, greens, lettuce, onions, parsley, peas, radishes, rhubarb, strawberries, watermelons  July: beans, beets, blueberries, broccoli, cabbage, cantaloupe, carrots, cauliflower, celery, cucumbers, eggplant, grapes, green onions, greens, lettuce, onions, parsley, peas, peaches, bell peppers, potatoes, radishes, summer raspberries, squash, sweetcorn, tomatoes, turnips, watermelons  August: apples, beans, beets, blueberries, cabbage, cantaloupe, carrots, cauliflower, celery, cucumbers, eggplant, grapes, green onions, greens, lettuce, onions, parsley, peas, peaches, pears, bell peppers, potatoes, radishes, squash, sweet corn, tomatoes, turnips, watermelons  September: apples, beans, beets, blueberries, cabbage, cantaloupe, carrots, cauliflower, celery, cucumbers, eggplant, grapes, green onions, greens, lettuce, onions, parsley, peas, peaches, pears, bell peppers, plums, potatoes, pumpkins, radishes, fall red raspberries, squash, sweet corn, tomatoes, turnips, watermelons  October: apples, beets, broccoli, cabbage, carrots, cauliflower, celery, green onions, greens, lettuce, parsley, peas, pears, potatoes, pumpkins, radishes, fall red raspberries, squash, turnips  November: broccoli, cabbage, carrots, parsley, pears, peas  December: use canned, frozen or dried fruits since lower in latex    Upto half of latex-sensitive patients show allergic reactions to fruits (avocados, bananas, kiwifruits, papayas, peaches),   Annals of Allergy, 1994. These plants contain the same proteins that are allergens in latex. People with fruit allergies should warn physicians before undergoing procedures which may cause anaphylactic reaction if in contact with latex gloves. Some of the common foods with defined cross-reactivity to latex are avocado, banana, kiwi, chestnut, raw potato, tomato, stone fruits (e.g., peach, cherry), hazelnut, melons, celery, carrot, apple, pear, papaya, and almond. Foods with less well-defined cross-reactivity to latex are peanuts, peppers, citrus fruits, coconut, pineapple, jose alejandro, fig, passion fruit, Ugli fruit, and grape. This fruit/latex cross-reactivity is worsened by ethylene, a gas used to hasten commercial ripening. In nature, plants produce low levels of the hormone ethylene, which regulates germination, flowering, and ripening.  Forced ripening by high ethylene concentrations, plants produce allergenic wound-repair proteins, which are similar to wound-repair proteins made during the tapping of rubber trees. Sensitive individuals who ingest the fruit get a higher dose and worse reaction. Some people may even first become sensitized to latex through fruit. Can food processing increase the concentrations of allergenic proteins? Latex-sensitized children (and adults) in [place] often experience allergic reactions after eating bananas ripened artificially with ethylene. In the [facility], food distribution centers treat unripe bananas and other produce with ethylene to ripen; not commonly done in [facility] since fruit is tree-ripened there. Does treatment of food with ethylene induce banana proteins that cross-react with latex? (Abbie et al.)    References:   Latex in Foods Allergy, http://ehp.niehs.nih.gov/members/2003/5811/5811.html    Search web for [employer] in Season \" for where you live or are at the time you food shop   Management of Latex, ://medicalcenter. o[employer].edu/  search for nih, latex-like proteins in foods

## 2022-04-27 ENCOUNTER — OFFICE VISIT (OUTPATIENT)
Dept: PSYCHOLOGY | Age: 52
End: 2022-04-27
Payer: COMMERCIAL

## 2022-04-27 DIAGNOSIS — F43.23 ADJUSTMENT DISORDER WITH MIXED ANXIETY AND DEPRESSED MOOD: Primary | ICD-10-CM

## 2022-04-27 PROCEDURE — 90837 PSYTX W PT 60 MINUTES: CPT | Performed by: PSYCHOLOGIST

## 2022-04-27 NOTE — PROGRESS NOTES
Behavioral Health Consultation/Psychotherapy Note  Shekhar Hansen. Bashir Lilly Psy.D. Visit Date:  4/27/2022    Patient:  Juan Pablo Rader  YOB: 1970  Chief Complaint:  Follow-up, Anxiety, and Stress    Duration of session:  60 minutes      S:     Ct said they scheduled Heriberto's reversal surgery for June 8. They stopped his pain management medication. She said his recovery has been up and down. She has noticed her own brain being in constant \"problem-solving mode. \"  She wishes she could have her life back. We processed this. We also reminded about leaning on her nikhil for strength. We reframed some things and talked about how she doesn't need to try so hard to make Ifeanyi Goodson do things, that by taking a paradoxical approach and backing off, she may be able to get what she's needing from him. She agreed to give it a try. O:    Appearance    Patient presents as alert, oriented, and cooperative, tearful with mild distress  Appetite normal  Sleep disturbance Some  Loss of pleasure Some  Speech    clear and understandable  Mood    Dysthymic  Affect    anxiety  Thought Process    linear and coherent  Insight    Good  Judgment    Intact  Memory    recent and remote memory intact  Suicide Assessment    no suicidal ideation      A:    1. Adjustment disorder with mixed anxiety and depressed mood        Ct needing supportive psychotherapy to address stress, anxiety, coping, through the use of mindfulness and relying on the strength of her nikhil.     P:    Meet again in 2-4 weeks. All questions about treatment plan answered. Patient instructed to go immediately to the emergency room and/or call 911 if any suicidal or homicidal ideations. Patient stated understanding and is agreeable to treatment and crisis plan.           Provider Signature:  Electronically signed by Loreda Heimlich, PSYD on 4/27/2022 at 11:58 AM

## 2022-05-18 ENCOUNTER — OFFICE VISIT (OUTPATIENT)
Dept: PSYCHOLOGY | Age: 52
End: 2022-05-18
Payer: COMMERCIAL

## 2022-05-18 DIAGNOSIS — F43.23 ADJUSTMENT DISORDER WITH MIXED ANXIETY AND DEPRESSED MOOD: Primary | ICD-10-CM

## 2022-05-18 PROCEDURE — 90834 PSYTX W PT 45 MINUTES: CPT | Performed by: PSYCHOLOGIST

## 2022-06-22 ENCOUNTER — OFFICE VISIT (OUTPATIENT)
Dept: PSYCHOLOGY | Age: 52
End: 2022-06-22
Payer: COMMERCIAL

## 2022-06-22 DIAGNOSIS — F43.23 ADJUSTMENT DISORDER WITH MIXED ANXIETY AND DEPRESSED MOOD: Primary | ICD-10-CM

## 2022-06-22 PROCEDURE — 90837 PSYTX W PT 60 MINUTES: CPT | Performed by: PSYCHOLOGIST

## 2022-06-22 NOTE — PROGRESS NOTES
Psychotherapy Note  Angel Cordero. Gabino Ferro Psy.D. Visit Date:  6/22/2022    Patient:  Barbara Perez  YOB: 1970  Chief Complaint:  Follow-up, Anxiety, and Stress      Duration of session:  60 minutes      S:     Ct said Sanaz Latham had the ileostomy reversal surgery a couple weeks ago. She chronicled how his recovery has been very difficult and the emotional and psychological burden on her has been very difficult as well. We talked about acceptance, and changing her expectations of Heriberto's situation. We also talked about how she can't fully carry Heriberto's burden for him, or live his life for him, that he has to do it, and accepting this. We also talked about how when Sanaz Latham goes through these difficult experiences, it can build resilience for him. O:    Appearance    Patient presents as alert, oriented, and cooperative, tearful with mild distress  Appetite normal  Sleep disturbance Some  Loss of pleasure Some  Speech    clear and understandable  Mood    Dysthymic  Affect    anxiety  Thought Process    linear and coherent  Insight    Good  Judgment    Intact  Memory    recent and remote memory intact  Suicide Assessment    no suicidal ideation      A:    1. Adjustment disorder with mixed anxiety and depressed mood        Ct needing supportive psychotherapy to address stress, anxiety, coping, through the use of mindfulness and relying on the strength of her nikhil.     P:    Meet again in 2-4 weeks. All questions about treatment plan answered. Patient instructed to go immediately to the emergency room and/or call 911 if any suicidal or homicidal ideations. Patient stated understanding and is agreeable to treatment and crisis plan.         Provider Signature:  Electronically signed by Davion Roa PSYD on 6/22/2022 at 9:40 AM

## 2022-08-05 ENCOUNTER — OFFICE VISIT (OUTPATIENT)
Dept: PSYCHOLOGY | Age: 52
End: 2022-08-05
Payer: COMMERCIAL

## 2022-08-05 DIAGNOSIS — F43.23 ADJUSTMENT DISORDER WITH MIXED ANXIETY AND DEPRESSED MOOD: Primary | ICD-10-CM

## 2022-08-05 PROCEDURE — 90837 PSYTX W PT 60 MINUTES: CPT | Performed by: PSYCHOLOGIST

## 2022-08-05 NOTE — PROGRESS NOTES
Psychotherapy Note  Sunitha Truong. Thanh Macias Psy.D. Visit Date:  8/5/2022    Patient:  Pati Baig  YOB: 1970  Chief Complaint:  Follow-up, Depression, Anxiety, and Stress      Duration of session:  60 minutes      S:     Ct said she's doing OK, said she's still dealing with the ups and downs of Heriberto's recovery. She said after a conversation with Samir Jackson one night, she woke up the next day in a panic. She tried praying and calling Chau, but couldn't get a hold of him, talked to her aunt. Chau finally came home and she broke down saying she couldn't deal with all of this anymore. We talked at length about how to have perspective with this, how to share the burden with others, and how worry is often unproductive. We focused on perspective taking and how to reframe things for Heriberto.      O:    Appearance    Patient presents as alert, oriented, and cooperative, tearful with mild distress  Appetite normal  Sleep disturbance Some  Loss of pleasure Some  Speech    clear and understandable  Mood    Dysthymic  Affect    anxiety  Thought Process    linear and coherent  Insight    Good  Judgment    Intact  Memory    recent and remote memory intact  Suicide Assessment    no suicidal ideation      A:    1. Adjustment disorder with mixed anxiety and depressed mood        Ct needing supportive psychotherapy to address stress, anxiety, coping, through the use of mindfulness and relying on the strength of her nikihl. P:    Meet again in 2-4 weeks. All questions about treatment plan answered. Patient instructed to go immediately to the emergency room and/or call 911 if any suicidal or homicidal ideations. Patient stated understanding and is agreeable to treatment and crisis plan.         Provider Signature:  Electronically signed by Susie Pinto PSYD on 8/5/2022 at 10:04 AM

## 2022-08-19 LAB — MAMMOGRAPHY, EXTERNAL: NORMAL

## 2022-09-14 ENCOUNTER — OFFICE VISIT (OUTPATIENT)
Dept: PSYCHOLOGY | Age: 52
End: 2022-09-14
Payer: COMMERCIAL

## 2022-09-14 DIAGNOSIS — F43.23 ADJUSTMENT DISORDER WITH MIXED ANXIETY AND DEPRESSED MOOD: Primary | ICD-10-CM

## 2022-09-14 PROCEDURE — 90837 PSYTX W PT 60 MINUTES: CPT | Performed by: PSYCHOLOGIST

## 2022-10-05 ENCOUNTER — TELEPHONE (OUTPATIENT)
Dept: FAMILY MEDICINE CLINIC | Age: 52
End: 2022-10-05

## 2022-10-05 ENCOUNTER — NURSE ONLY (OUTPATIENT)
Dept: LAB | Age: 52
End: 2022-10-05

## 2022-10-05 DIAGNOSIS — F41.9 ANXIETY: ICD-10-CM

## 2022-10-05 DIAGNOSIS — G47.00 INSOMNIA, UNSPECIFIED TYPE: ICD-10-CM

## 2022-10-05 DIAGNOSIS — F32.A DEPRESSIVE DISORDER: ICD-10-CM

## 2022-10-05 DIAGNOSIS — E78.9 LIPID DISORDER: ICD-10-CM

## 2022-10-05 DIAGNOSIS — N92.4 ABNORMAL PERIMENOPAUSAL BLEEDING: Primary | ICD-10-CM

## 2022-10-05 DIAGNOSIS — Z00.00 GENERAL MEDICAL EXAM: ICD-10-CM

## 2022-10-05 DIAGNOSIS — N80.9 ENDOMETRIOSIS: ICD-10-CM

## 2022-10-05 DIAGNOSIS — N92.4 ABNORMAL PERIMENOPAUSAL BLEEDING: ICD-10-CM

## 2022-10-05 DIAGNOSIS — R53.83 TIRED: ICD-10-CM

## 2022-10-05 DIAGNOSIS — K90.0 CELIAC DISEASE: ICD-10-CM

## 2022-10-05 LAB
ALBUMIN SERPL-MCNC: 4.5 G/DL (ref 3.5–5.1)
ALP BLD-CCNC: 47 U/L (ref 38–126)
ALT SERPL-CCNC: 23 U/L (ref 11–66)
ANION GAP SERPL CALCULATED.3IONS-SCNC: 11 MEQ/L (ref 8–16)
AST SERPL-CCNC: 22 U/L (ref 5–40)
AVERAGE GLUCOSE: 108 MG/DL (ref 70–126)
BASOPHILS # BLD: 0.7 %
BASOPHILS ABSOLUTE: 0 THOU/MM3 (ref 0–0.1)
BILIRUB SERPL-MCNC: 0.8 MG/DL (ref 0.3–1.2)
BILIRUBIN DIRECT: < 0.2 MG/DL (ref 0–0.3)
BUN BLDV-MCNC: 18 MG/DL (ref 7–22)
C-REACTIVE PROTEIN, HIGH SENSITIVITY: 1.3 MG/L
CALCIUM SERPL-MCNC: 10.2 MG/DL (ref 8.5–10.5)
CHLORIDE BLD-SCNC: 101 MEQ/L (ref 98–111)
CO2: 28 MEQ/L (ref 23–33)
CREAT SERPL-MCNC: 0.6 MG/DL (ref 0.4–1.2)
EOSINOPHIL # BLD: 0.9 %
EOSINOPHILS ABSOLUTE: 0.1 THOU/MM3 (ref 0–0.4)
ERYTHROCYTE [DISTWIDTH] IN BLOOD BY AUTOMATED COUNT: 14.1 % (ref 11.5–14.5)
ERYTHROCYTE [DISTWIDTH] IN BLOOD BY AUTOMATED COUNT: 44.5 FL (ref 35–45)
GFR SERPL CREATININE-BSD FRML MDRD: > 90 ML/MIN/1.73M2
GLUCOSE BLD-MCNC: 91 MG/DL (ref 70–108)
HBA1C MFR BLD: 5.6 % (ref 4.4–6.4)
HCT VFR BLD CALC: 41.2 % (ref 37–47)
HEMOGLOBIN: 13.4 GM/DL (ref 12–16)
HOMOCYSTEINE: 6.9 UMOL/L
IMMATURE GRANS (ABS): 0.02 THOU/MM3 (ref 0–0.07)
IMMATURE GRANULOCYTES: 0.3 %
LYMPHOCYTES # BLD: 35.8 %
LYMPHOCYTES ABSOLUTE: 2.1 THOU/MM3 (ref 1–4.8)
MAGNESIUM: 2.4 MG/DL (ref 1.6–2.4)
MCH RBC QN AUTO: 28.2 PG (ref 26–33)
MCHC RBC AUTO-ENTMCNC: 32.5 GM/DL (ref 32.2–35.5)
MCV RBC AUTO: 86.7 FL (ref 81–99)
MONOCYTES # BLD: 5.6 %
MONOCYTES ABSOLUTE: 0.3 THOU/MM3 (ref 0.4–1.3)
NUCLEATED RED BLOOD CELLS: 0 /100 WBC
PLATELET # BLD: 246 THOU/MM3 (ref 130–400)
PMV BLD AUTO: 9.7 FL (ref 9.4–12.4)
POTASSIUM SERPL-SCNC: 3.8 MEQ/L (ref 3.5–5.2)
PTH INTACT: 39.7 PG/ML (ref 15–65)
RBC # BLD: 4.75 MILL/MM3 (ref 4.2–5.4)
RHEUMATOID FACTOR: < 10 IU/ML (ref 0–13)
SEDIMENTATION RATE, ERYTHROCYTE: 1 MM/HR (ref 0–20)
SEG NEUTROPHILS: 56.7 %
SEGMENTED NEUTROPHILS ABSOLUTE COUNT: 3.3 THOU/MM3 (ref 1.8–7.7)
SODIUM BLD-SCNC: 140 MEQ/L (ref 135–145)
TOTAL PROTEIN: 7.2 G/DL (ref 6.1–8)
VITAMIN D 25-HYDROXY: 59 NG/ML (ref 30–100)
WBC # BLD: 5.9 THOU/MM3 (ref 4.8–10.8)

## 2022-10-05 NOTE — TELEPHONE ENCOUNTER
Pt called. She is at the [address] [facility] lab now, thought she had lab orders pending for next weeks visit. I asked her if there are new symptoms or issues? She said no. Had a hormone panel done last week so she doesn't want any levels for this ran. Also she didn't want any thyroid labs done either. Pended orders. Please review, approve or deny. Add diagnosis    I need to call her when approved. She is at the lab in her car waiting. Told her about 1/2 hr - 45 minutes since MD in with a pt.

## 2022-10-06 LAB
DHEAS (DHEA SULFATE): 491 UG/DL (ref 26–200)
GLIADIN PEPTIDE IGG: 0.9 U/ML
TISSUE TRANSGLUTAMINASE IGA: < 0.1 U/ML

## 2022-10-09 LAB — DHEA UNCONJUGATED: 5.32 NG/ML (ref 0.63–4.7)

## 2022-10-26 ENCOUNTER — OFFICE VISIT (OUTPATIENT)
Dept: FAMILY MEDICINE CLINIC | Age: 52
End: 2022-10-26
Payer: COMMERCIAL

## 2022-10-26 VITALS
WEIGHT: 135.4 LBS | TEMPERATURE: 97.2 F | OXYGEN SATURATION: 99 % | HEART RATE: 51 BPM | SYSTOLIC BLOOD PRESSURE: 104 MMHG | DIASTOLIC BLOOD PRESSURE: 64 MMHG | BODY MASS INDEX: 20.06 KG/M2 | HEIGHT: 69 IN | RESPIRATION RATE: 10 BRPM

## 2022-10-26 DIAGNOSIS — F32.A DEPRESSIVE DISORDER: ICD-10-CM

## 2022-10-26 DIAGNOSIS — E78.9 LIPID DISORDER: ICD-10-CM

## 2022-10-26 DIAGNOSIS — K90.0 CELIAC DISEASE: ICD-10-CM

## 2022-10-26 DIAGNOSIS — F41.9 ANXIETY: Primary | ICD-10-CM

## 2022-10-26 DIAGNOSIS — E03.8 OTHER SPECIFIED HYPOTHYROIDISM: ICD-10-CM

## 2022-10-26 DIAGNOSIS — R53.83 TIRED: ICD-10-CM

## 2022-10-26 DIAGNOSIS — J45.20 MILD INTERMITTENT ASTHMA WITHOUT COMPLICATION: ICD-10-CM

## 2022-10-26 DIAGNOSIS — Z00.00 GENERAL MEDICAL EXAM: ICD-10-CM

## 2022-10-26 DIAGNOSIS — G47.00 INSOMNIA, UNSPECIFIED TYPE: ICD-10-CM

## 2022-10-26 PROCEDURE — 99214 OFFICE O/P EST MOD 30 MIN: CPT | Performed by: FAMILY MEDICINE

## 2022-10-26 SDOH — ECONOMIC STABILITY: FOOD INSECURITY: WITHIN THE PAST 12 MONTHS, YOU WORRIED THAT YOUR FOOD WOULD RUN OUT BEFORE YOU GOT MONEY TO BUY MORE.: NEVER TRUE

## 2022-10-26 SDOH — ECONOMIC STABILITY: FOOD INSECURITY: WITHIN THE PAST 12 MONTHS, THE FOOD YOU BOUGHT JUST DIDN'T LAST AND YOU DIDN'T HAVE MONEY TO GET MORE.: NEVER TRUE

## 2022-10-26 ASSESSMENT — PATIENT HEALTH QUESTIONNAIRE - PHQ9
4. FEELING TIRED OR HAVING LITTLE ENERGY: 0
SUM OF ALL RESPONSES TO PHQ QUESTIONS 1-9: 0
2. FEELING DOWN, DEPRESSED OR HOPELESS: 0
8. MOVING OR SPEAKING SO SLOWLY THAT OTHER PEOPLE COULD HAVE NOTICED. OR THE OPPOSITE, BEING SO FIGETY OR RESTLESS THAT YOU HAVE BEEN MOVING AROUND A LOT MORE THAN USUAL: 0
6. FEELING BAD ABOUT YOURSELF - OR THAT YOU ARE A FAILURE OR HAVE LET YOURSELF OR YOUR FAMILY DOWN: 0
7. TROUBLE CONCENTRATING ON THINGS, SUCH AS READING THE NEWSPAPER OR WATCHING TELEVISION: 0
9. THOUGHTS THAT YOU WOULD BE BETTER OFF DEAD, OR OF HURTING YOURSELF: 0
3. TROUBLE FALLING OR STAYING ASLEEP: 0
SUM OF ALL RESPONSES TO PHQ QUESTIONS 1-9: 0
5. POOR APPETITE OR OVEREATING: 0
10. IF YOU CHECKED OFF ANY PROBLEMS, HOW DIFFICULT HAVE THESE PROBLEMS MADE IT FOR YOU TO DO YOUR WORK, TAKE CARE OF THINGS AT HOME, OR GET ALONG WITH OTHER PEOPLE: 0
SUM OF ALL RESPONSES TO PHQ QUESTIONS 1-9: 0
SUM OF ALL RESPONSES TO PHQ QUESTIONS 1-9: 0

## 2022-10-26 ASSESSMENT — SOCIAL DETERMINANTS OF HEALTH (SDOH): HOW HARD IS IT FOR YOU TO PAY FOR THE VERY BASICS LIKE FOOD, HOUSING, MEDICAL CARE, AND HEATING?: NOT VERY HARD

## 2022-10-26 NOTE — PROGRESS NOTES
[id number] [facility]. [address] [address]  Dept: [phone]  Dept Fax: [phone]  Loc: [phone]      Marisela Land is a 49 y.o. White female. Truman Light  presents to the [place] [address] clinic today for   Chief Complaint   Patient presents with    Discuss Labs    Detached Retina     Saw a specialist, bubble in the eye   , and;   1. Anxiety    2. Celiac disease    3. Insomnia, unspecified type    4. Depressive disorder    5. Lipid disorder    6. Tired    7. General medical exam    8. Mild intermittent asthma without complication    9. Other specified hypothyroidism          I have reviewed Marisela Land medical, surgical and other pertinent history in detail, and have updated medication and allergy information in the computerized patient record. Clinical Care Team:     -Referring Provider for today's consult: self  -Primary Care Provider: Sheryl Ruiz DO    Medical/Surgical History:   She  has a past medical history of Celiac disease and Hypothyroidism. Her  has a past surgical history that includes Upper gastrointestinal endoscopy (November 2013,2021); Colonoscopy (July 2013,2021); Appendectomy (2000); Anus surgery (2000); and other surgical history. Family/Social History:     Her family history includes Breast Cancer in her maternal aunt; Cervical Cancer in her maternal grandmother; Colon Cancer in her paternal aunt; Colon Polyps in her father; Esophageal Cancer in her maternal grandmother; High Cholesterol in her father; Lacy Kevyn in her maternal grandmother; Rectal Cancer in her child; Uterine Cancer in her maternal aunt. She  reports that she has never smoked. She has never used smokeless tobacco. She reports that she does not drink alcohol and does not use drugs.     Medications/Allergies/Immunizations:     Her current medication(s) include   Current Outpatient Medications: Cholecalciferol (VITAMIN D3) 50 MCG (2000 UT) CAPS, Take 1 capsule by mouth daily Skip Sundays, Disp: , Rfl:     clindamycin (CLEOCIN T) 1 % external solution, Apply topically as needed, Disp: , Rfl: 3    Lysine 500 MG TABS, Take 500 mg by mouth 4 times daily, Disp: , Rfl:     Ascorbic Acid (VITAMIN C) 1000 MG tablet, Take 1,000 mg by mouth 4 times daily , Disp: , Rfl:     Menaquinone-7 (VITAMIN K2) 100 MCG CAPS, Take 1 capsule by mouth 2 times daily, Disp: , Rfl:     TAZORAC 0.1 % cream, Apply as needed nightly, Disp: , Rfl:     NONFORMULARY, Progesterone compounded capsule- 1 capsule daily on last half of cycle, Disp: , Rfl:     Specialty Vitamins Products (MAGNESIUM, AMINO ACID CHELATE,) 133 MG tablet, Take 1 tablet by mouth 4 times daily. , Disp: , Rfl:     ferrous sulfate 325 (65 FE) MG tablet, Take 325 mg by mouth nightly., Disp: , Rfl:     Omega-3 1400 MG CAPS, Take 2 capsules by mouth 6 times daily [facility]  Or NOW1 before and as finish meals and at bedtime, Disp: , Rfl:     Cinnamon 500 MG CAPS, Take 1 capsule by mouth 4 times daily (before meals and nightly). , Disp: , Rfl:     Magnesium Citrate 100 MG TABS, Take 3 tablets by mouth daily , Disp: , Rfl:     THYROID PO, Compound Thyroid 40 mcg/12 mcg Take 1 capsule by mouth daily, Disp: , Rfl:     DHEA 10 MG TABS, Take 20 mg by mouth daily Skip Mon Thur, if does not feel good, go to 10 mg per day and double Mon Wed Fri, Disp: , Rfl:     QUERCETIN PO, Take 1 capsule by mouth daily as needed (allergies). , Disp: , Rfl:     B Complex Vitamins (VITAMIN B COMPLEX) TABS, Take 1 tablet by mouth 3 times daily (before meals) [employer], or natures made, Disp: , Rfl:   Allergies: Gluten meal  Immunizations:   Immunization History   Administered Date(s) Administered    Tdap (Boostrix, Adacel) 10/14/2010        History of Present Illness:     Irina's had concerns including Discuss Labs and Detached Retina (Saw a specialist, bubble in the eye). Rhoda Molina  presents to the [facility] today for;   Chief Complaint   Patient presents with    Discuss Labs    Detached Retina     Saw a specialist, bubble in the eye   , abnormal labs follow up and these conditions as she  Is looking today for:     1. Anxiety    2. Celiac disease    3. Insomnia, unspecified type    4. Depressive disorder    5. Lipid disorder    6. Tired    7. General medical exam    8. Mild intermittent asthma without complication    9. Other specified hypothyroidism      HPI    Subjective:     Review of Systems   All other systems reviewed and are negative. Objective:     /64 (Site: Right Upper Arm, Position: Sitting, Cuff Size: Medium Adult)   Pulse 51   Temp 97.2 °F (36.2 °C) (Temporal)   Resp 10   Ht 5' 9\" (1.753 m)   Wt 135 lb 6.4 oz (61.4 kg)   SpO2 99%   BMI 20.00 kg/m²   Physical Exam  Vitals and nursing note reviewed. Constitutional:       Appearance: Normal appearance. HENT:      Head: Normocephalic. Pulmonary:      Effort: Pulmonary effort is normal.   Neurological:      Mental Status: She is alert. Psychiatric:         Mood and Affect: Mood normal.         Thought Content: Thought content normal.          Laboratory Data:   Lab results were searched in Care Everywhere and/or those brought by the pateint were reviewed today with Jay Cloud and she has a copy of their most recent labs to take home with them as noted below;       Imaging Data:   Imaging Data:       Assessment & Plan:       Impression:  1. Anxiety    2. Celiac disease    3. Insomnia, unspecified type    4. Depressive disorder    5. Lipid disorder    6. Tired    7. General medical exam    8. Mild intermittent asthma without complication    9.  Other specified hypothyroidism      Assessment and Plan:  After reviewing the patients chief complaints, reviewing their labfindings in great detail (with the patient and those accompanying them) which correlate to their chief complaints, symptoms, and or medical conditions; suggestions were made relating to changes in diet and or supplements which may improve the complaints and which will be reflected in their future lab findings; Chief Complaint   Patient presents with    Discuss Labs    Detached Retina     Saw a specialist, bubble in the eye   ;    Plans for the next visits:  - Abnormal and non-optimal Labs were ordered today to be repeated in the next 120-365 days to assess changes from adjustments in nutrition and or nutrients. - Patient instructed when having a blood draw to ask the  to divide their lab draws into multiple draws over several days if not feeling good at the time of the lab draw or if either prefers to do several smaller blood draws over several days  -Patient instructed to check with insurer before each lab draw and to go to the lab which the insurer directs them for the most cost effective lab draw with the least patient's cost  - Sharon Helton  will be scheduled subsequent to those results. Naveen Al will bring in her drink, food, supplement log to her next visit    Chronic Problems Addressed on this Visit:                                   1.  Intensity of Service; Uncontrolled items at this visit; Chief Complaint   Patient presents with    Discuss Labs    Detached Retina     Saw a specialist, bubble in the eye   ; Improved items at this visit and Stable items were discussed at this visit;  2. Patients food, drinks, supplements and symptoms were reviewed with the patient,       - Sharon Helton will bring food, drink, supplements and symptoms log to next visit for inclusion in their record      - 75 better food list reviewed & given to patient with the omega 6 food list to avoid      - The 52 Latex foods list was reviewed and given to the patients with the information on carrageenan         - Gluten in corn and oats abstracts sheet reviewed and given to the patient today   3.    Greater than 35 minutes time was spent with the patient face to face on this visit; of which >50% was for counseling and coordination of care, as well as the time spent before and after the visit reviewing the chart, documenting the encounter, reviewing labs,reports, NIH listed studies, making phone calls, etc.      Patients food and drinks were reviewed with the patient,   - They will bring a food drink symptom log to future visits for inclusion in their record    - 75 better food list reviewed & given to patient along with the omega 6 food list to avoid      - Gluten in corn and oats abstracts sheet reviewed and given to the patient today    - 23 Foods containing Latex-like proteins was reviewed and copy to be taken if desired     - Nutrient Supplements list provided and copyto be taken if desired    - Gfovmniklyjqms682slfu. [employer] web site offered to patient to review at their convenience by staff with login information    Note:  I have discussed with the patient that with all nutraceuticals, there is often mixed data and emerging research which needs to be monitored; as well as an array of NIH fact sheets on nutrients and supplements, available at www.nih,issue plus Catglobe. [employer] plus www.iCrimefighteri,org. If I have recommended cinnamon at the request of this patient to assist them in control of their blood sugar, triglyceride, and/or weight issues. I discussed that the patient's clinical use of cinnamon bark, calcium, magnesium, Vitamin D, and pharmaceutical grade CVS omega 3 oil or triple-strength fish oil, and B-50/B-100 time-released B-complex by [address] [address] [facility] will be for a time-limited trial to determine their individual effectiveness and safety in this patient. I also referred the patient to the NMCD: Nutrition, Metabolism, and Cardiovascular Diseases (SecuritiesCard.pl) and concerns about long-term use and hepatotoxicity of cinnamon and other nutrients.   I suggested they frequently search nih.gov for the latest non-proprietary information on nutriceuticals as well as consider a subscription to [employer] for details on reviewed supplements, or at the least review the nutrient files at [facility], [address] bark, an insulin mimetic, reduces some High Carbohydrate Dietary Impacts. Methylhydroxychalcone polymers insulin-enhancing properties in fat cells are responsible for enhanced glucose uptake, inhibiting hepatic HMG-CoA reductase and lowers lipids. www.jacn. org/content/20/4/327.full     But cinnamon with additives such as Cinnamon Extract are not effective as insulin mimetics.  :eStoreDirectory.at     Nutrients for Start up from [employer] or [employer] for ease to get started now;  Keyur Desai has some useable products;  - Triple Strength Fish Oil, enteric coated  - Vit D-3 5000 IU gel caps  - Iron ferrous sulfate 325 mg tabs  - Centrum Silver look-a-like for most patients, or  - Centrum plain look-a-like if need iron    Local pharmacies or chains such as [employer], have:  - [employer] pharmaceutical grade omega 3 is 90% EPA/DHA whereas most Triple strength fish oil are 75% EPA/DHA  - Triple Strength Fish Oil (enteric coated if available) or if not enteric coated, can take from freezer for less burps  - B-50 or B-100 released balanced B complex tabs by [address] [address] [facility] bark 500 mg (without Chromium or extracts)   some brands list 1000 mg / serving of 2 capsules,    some brands have 1000 mg caps with the undesireable chromium extract  - Calcium carbonate/citrate, magnesium oxide/citrate, Vit D-3 as 3-4 tabs/caps/serving     Some Local Brands may contain Zinc which is acceptable for the first bottle or two  - Magnesium oxide 250 mg tabs for those having < 2 bowel movements daily  - Magnesium citrate 200 mg if having > 2 bowel movements/day  - Centrum Silver or look-a-like for most patients, Centrum plain or look-a-like with iron  - Vitamin D-3 comes as 1,000 IU or 2,000 IU or 5,000 IU gel caps or Liquid drops but keep Vitamin D levels <50 but >40     Some brands containing or derived from soy oil or corn oil are OK if not allergic to soy  - Elemental Iron 65 mg tabs at bedtime is available over the counter if need more iron     Usually turns bowel movements grey, green, or black but not a concern  - Apricot Kernel Oil (by Now) for dry skin sensitive perineal or perianal area skin    Nutrients for ongoing use by Mail order for less expense from [employer] ;  - Strength Fish Oil , 240 Softgels Item #[id number]  -B-100 time released balanced B complex Item #[id number]  - Cinnamon bark 500 mg without Chromium or extract Item #722327  - Calcium carbonate 1000 mg, Magnesium oxide 500 mg, Vit D-3 400 IU Item #[id number]  - Magnesium oxide 500 mg tabs Item #349977 if less than 2 bowel movements daily  - [employer] Seniors Item #417369 for most patients, One Daily Item #339884 with iron  - Vit D 3  1,000 Item #[id number]      2,000 IU Item #179250   Item #704540     Some brands containing orderived from soy oil or corn oil are OK if not allergic to soy    Nutrients for Special Needs by Mail order for less expense from www. puritan.com;  -Elemental Iron 65 mg tabs Item #837700 if need more iron for low iron on labs    Usually turns bowel movements grey, green or black but not a concern  - Time released Niacin 250 mg Item #639240 for cold intolerance, low libido or impotence  - DHEA 50 mg Item #461721 for improving DHEA levels on labs if having Fatigue    If stools too loose substitute for your Magnesium oxide using;   Magnesium citrate 200 mg tabs (NOT liquid) at [employer]. Degreed   Magnesium gluconate 550 mg by Wayne Romo at eSoft or Kähu. com  Magnesium chloride foot soaks or body sprays  www.ColdWatt   Magnesium chloride flakes 14.99 Item #: [id number] if back-ordered, get spray  Magnesium threonate, Magtein also helps mental clarity and sleep    Food Drink Symptom Log;  I asked this patient to track these items and any other symptoms on their list on a weekly basis to documenttheir progress or lack of same. This can be done on the symptom tracking sheet I gave them at today's visit but looks like this:                                                      Rate on scale of 0-10 with zero = not noticeable  Symptom:                            Week 1               2                 3                 4               Etc            Hair loss    Foot cramps    Paresthesia    Aches    IBS (irritable bowel)    Constipation    Diarrhea  Nocturia (up to bathroom at night)    Fatigue/Energy level  Stress      On the other side of the sheet they can track their food, drink, environment, activity, symptoms etc      Avoiding Latex-like proteins in my foods; Avocados, Bananas, Celery, Figs & Kiwi proteins have latex-like proteins to inflame our immune systems, plus 47 more foods  How Can I Have A Latex Allergy? Eating foods with latex-like protein exposes us to latex allergies. Our body cannot tell the differencebetween these latex-like proteins and latex from rubber products since many people are allergic to fruit, vegetables and latex. Read labels on pre-packaged foods. This list to avoid is only a guide if you are known allergicto latex or have a latex rash on your chin, cheeks and lines on your neck and chest. The amount of latex is different in each food product or fruit variety. Avoid out of Season if not grown locally:   Melon, Nectarine, Papaya, Cherry, Passion fruit, Plum, Chestnuts, and Tomato. Avocado, Banana, Celery, Figs, and Kiwi always contain Latex-like protein. Whats in Season? Strawberries taste better in June than December because June is strawberry season so buy locally grown produce \"in season\" for the best flavor, cost, and less Latex.  Locally grown produce not only tastes great but also requires little or no ethylene exposure in food distribution so has less latex content. Out of season: use canned, frozen, or dried since those are processed ripe and latex content is lower!!! Month     [place] Locally Grown Produce  January, February, March: use canned, frozen or dried fruits since lower in latex  April: asparagus, radishes  May: asparagus, broccoli, green onions, greens, peas, radishes, rhubarb  June: asparagus, beets, beans, broccoli, cabbage, cantaloupe, carrots, green onions, greens, lettuce, onions, parsley, peas, radishes, rhubarb, strawberries, watermelons  July: beans, beets, blueberries, broccoli, cabbage, cantaloupe, carrots, cauliflower, celery, cucumbers, eggplant, grapes, green onions, greens, lettuce, onions, parsley, peas, peaches, bell peppers, potatoes, radishes, summer raspberries, squash, sweetcorn, tomatoes, turnips, watermelons  August: apples, beans, beets, blueberries, cabbage, cantaloupe, carrots, cauliflower, celery, cucumbers, eggplant, grapes, green onions, greens, lettuce, onions, parsley, peas, peaches, pears, bell peppers, potatoes, radishes, squash, sweet corn, tomatoes, turnips, watermelons  September: apples, beans, beets, blueberries, cabbage, cantaloupe, carrots, cauliflower, celery, cucumbers, eggplant, grapes, green onions, greens, lettuce, onions, parsley, peas, peaches, pears, bell peppers, plums, potatoes, pumpkins, radishes, fall red raspberries, squash, sweet corn, tomatoes, turnips, watermelons  October: apples, beets, broccoli, cabbage, carrots, cauliflower, celery, green onions, greens, lettuce, parsley, peas, pears, potatoes, pumpkins, radishes, fall red raspberries, squash, turnips  November: broccoli, cabbage, carrots, parsley, pears, peas  December: use canned, frozen or dried fruits since lower in latex    Upto half of latex-sensitive patients show allergic reactions to fruits (avocados, bananas, kiwifruits, papayas, peaches),   Annals of Allergy, 1994.  These plants contain the same proteins that are allergens in latex. People with fruit allergies should warn physicians before undergoing procedures which may cause anaphylactic reaction if in contact with latex gloves. Some of the common foods with defined cross-reactivity to latex are avocado, banana, kiwi, chestnut, raw potato, tomato, stone fruits (e.g., peach, cherry), hazelnut, melons, celery, carrot, apple, pear, papaya, and almond. Foods with less well-defined cross-reactivity to latex are peanuts, peppers, citrus fruits, coconut, pineapple, jose alejandro, fig, passion fruit, Ugli fruit, and grape. This fruit/latex cross-reactivity is worsened by ethylene, a gas used to hasten commercial ripening. In nature, plants produce low levels of the hormone ethylene, which regulates germination, flowering, and ripening. Forced ripening by high ethylene concentrations, plants produce allergenic wound-repair proteins, which are similar to wound-repair proteins made during the tapping of rubber trees. Sensitive individuals who ingest the fruit get a higher dose and worse reaction. Some people may even first become sensitized to latex through fruit. Can food processing increase the concentrations of allergenic proteins? Latex-sensitized children (and adults) in [place] often experience allergic reactions after eating bananas ripened artificially with ethylene. In the [place] Filbert, food distribution centers treat unripe bananas and other produce with ethylene to ripen; not commonly done in [facility] since fruit is tree-ripened there. Does treatment of food with ethylene induce banana proteins that cross-react with latex? (Abbie et al.)    References:   Latex in Foods Allergy, http://ehp.niehs.nih.gov/members/2003/5811/5811.html    Search web for [employer] in Season \" for where you live or are at the time you food shop   Management of Latex, ://medicalcenter. osu.edu/  search for nih, latex-like proteins in foods

## 2022-11-11 ENCOUNTER — OFFICE VISIT (OUTPATIENT)
Dept: PSYCHOLOGY | Age: 52
End: 2022-11-11
Payer: COMMERCIAL

## 2022-11-11 DIAGNOSIS — F43.23 ADJUSTMENT DISORDER WITH MIXED ANXIETY AND DEPRESSED MOOD: Primary | ICD-10-CM

## 2022-11-11 PROCEDURE — 90837 PSYTX W PT 60 MINUTES: CPT | Performed by: PSYCHOLOGIST

## 2022-11-11 NOTE — PATIENT INSTRUCTIONS
Mindfulness Attitudes that provide foundation for healthier living  From the work of Robbie Fernando, PhD, in [facility]    1. Non-Judging   Taking the stance of an impartial witness to your own experience. Noticing the stream of judging mind . . good / bad / neutral not trying to  stop it but just being aware of it. 2. Patience   Letting things unfold in their own time   A child may try to help a butterfly emerge by breaking open a chrysalis but  chances are the butterfly wont benefit from this help. Practising patience with ourselves. Why rush through some moments in  order to get to other better ones? Each one is your life in that moment.    Being completely open to each moment, accepting its fullness, knowing that  like the butterfly, things will emerge in their own time. 3. Beginners Mind   Too often we let our thinking and our beliefs about what we know stop us  from seeing things as they really are. Cultivating a mind that is willing to see everything as if for the first time. Being receptive to new possibilities not getting stuck in a rut of our own  expertise. Each moment is unique and contains unique possibilities. Try it with someone you know - next time, ask yourself if you are seeing  this person with fresh eyes, as he/she really is? Try it with problems with  the jazlyn with the dog with the man in the corner shop. [address] a basic trust in yourself and your feelings. Trusting in your own authority and intuition, even if you make some  mistakes along the way. Honour your feelings. Taking responsibility for yourself and your own wellbeing. 5. Non-Striving   Meditation has no goal other than for you to be yourself. The irony is you  already are. Paying attention to how you are right now - however that it is. Just watch.    The best way to achieve your own goals is to back off from striving and  instead start to really focus on carefully seeing and accepting things as they  are, moment by moment. With patience and regular practice, movement  towards your goals will take place by itself. 6. Acceptance   Seeing things as they actually are in the present. If you have a headache,  accept you have a headache. Meditation Maintenance: A Follow on Course © Bridgette Lunsford   We often waste a lot of time and energy denying what is fact. We are trying  to force situations to how we would like them to be. This creates more  tension and prevents positive change occurring. Now is the only time we have for anything. You have to accept yourself as  you are before you can really change. Acceptance is not passive; it does not mean you have to like everything and  abandon your principles and values. It does not mean you have to be  resigned to tolerating things. It does not mean that you should stop trying to  break free of your own self-destructive habits or give up your desire to  change and grow. Acceptance is a willingness to see things as they are. You are much more  likely to know what to do and have an inner conviction to act when you have  a clear picture of what is actually happening. 7. Letting Go   Letting go is a way of letting things be, of accepting things as they are. We let things go and we just watch   If we find it particularly difficult to let go of something because it has such a  strong hold on our mind, we can direct our attention to what holding feels  like. Holding on is the opposite of letting go. Being willing to look at the  ways we hold on shows a lot about its opposite. You already know how to let go Every night when we go to sleep we let go of our bodily sensations, thoughts, worries.

## 2022-11-11 NOTE — PROGRESS NOTES
Psychotherapy Note  Berdie Apley. Jerson Brooks Psy.D. Visit Date:  11/11/2022    Patient:  Marisela Land  YOB: 1970  Chief Complaint:  Follow-up, Anxiety, and Stress      Duration of session:  60 minutes      S:     Ct said Fredy Ang had a meltdown at home, punching walls, etc., before starting his job at [employer]. Even after starting the job, Fredy Ang continued to have meltdowns. Ct said this really affects her and she experiences so much anxiety from trying to manage things. We talked about weaving in nikhil and trusting in God's plan, even if she doesn't understand it, in accordance with what she believes. Ct also talked about how her retina detached and everything she had to go through to restore her vision. We processed thoughts and feelings about the situation. O:    Appearance    Patient presents as alert, oriented, and cooperative, tearful with mild distress  Appetite normal  Sleep disturbance Some  Loss of pleasure Some  Speech    clear and understandable  Mood    Dysthymic  Affect    anxiety  Thought Process    linear and coherent  Insight    Good  Judgment    Intact  Memory    recent and remote memory intact  Suicide Assessment    no suicidal ideation      A:    1. Adjustment disorder with mixed anxiety and depressed mood      Ct needing supportive psychotherapy to address stress, anxiety, coping, through the use of mindfulness and relying on the strength of her nikhil. P:    Referral made to [facility] for psychiatry. Meet again in 2-4 weeks. All questions about treatment plan answered. Patient instructed to go immediately to the emergency room and/or call 911 if any suicidal or homicidal ideations. Patient stated understanding and is agreeable to treatment and crisis plan.         Provider Signature:  Electronically signed by Kwaku Cantor PSYD on 11/11/2022 at 10:03 AM

## 2022-11-18 NOTE — PROGRESS NOTES
[address] [id number]  Dept: [phone]  Dept Fax: [phone]: [phone]    Visit Date: 2022    SUBJECTIVE DATA       CHIEF COMPLAINT:    Chief Complaint   Patient presents with    Psychiatric Evaluation    New Patient         History obtained from: patient    HISTORY OF PRESENT ILLNESS:    David Hyde is a 47 y.o. female who presents to the office as new to provider, for medication management. HPI  Patient presents calm and cooperative. Patient states feeling \"nervous\" today. Mostly about this appointment, but also about her son, and just found out her sister-in-law  from long term diagnosis of cancer yesterday. Patient endorses depression and anxiety in her early 's, was on Zoloft for a period of time. She had postpartum depression after her children were delivered in  and  and was started on Wellbutrin at that time, but stopped as she also was diagnosed with hypothyroidism and low progesterone, and was started on medication for those which helped. In , when her daughter was born, she had postpartum depression again but not as severe, and did not take antidepressants at that time. Then in , her son was diagnosed with colorectal cancer with chemo and surgery, and her anxiety increased and was prescribed Buspar at that time. She stated he had a colostomy irreversible surgery this year and he has had a \"olivia recovery\". Patient stated Buspar wasn't helping any longer over the past few months, as well as going through menopause with hormones fluctuating. Patient stated she stopped the Buspar and started Paxil one week ago. Patient stated \"mornings are the worst for me\" with anxiety, and as the day goes on it improves. Patient stated CBD oil helps anxiety decrease. Patient stated she used an old prescription of hydroxyzine recently, which was helpful.  Encouraged patient to use hydroxyzine instead of CBD oil as even though it is minimal amounts, cannabis is still a substance that the brain can crave, wanting more, and can increase anxiety with prolonged or increased use. Patient verbalized understanding. She stated the Paxil has improved anxiety in the past 2-3 days, stated the Paxil is improving symptoms slowly. Discussed medication management with patient. Patient agreed with continuing Paxil and hydroxyzine. Patient rates depression 5/10 and anxiety 6/10 on a scale of 0-10 with 10 being the worst.   Sleep- able to fall asleep, not able to stay asleep, not feeling rested. Patient stated in the past 3 days, she is sleeping better though since starting Paxil. Interest- diminished  Energy and motivation - Diminished  Concentration - poor  Memory -poor  Appetite- fair  Endorses irritability  Endorses restlessness  Endorses hopelessness, helplessness, and worthlessness  Denies suicidal or homicidal ideation, plan, or intent  Patient stated she had hallucinations with postpartum depression after first child  Denies auditory, visual, or other hallucinations    Labs drawn on 10/5/22 were reviewed and reflect no critical abnormals. PTSD ASSESSMENT: with her son's diagnosis and treatment of cancer. Patient denies other history of physical, mental, or sexual abuse. Exposed to actual or threatened death, serious injury, or sexual violence in one of the following ways:  Directly experienced or witnessed? Yes  Learning a traumatic event occurred to a close family/friend? Yes  Repeated traumatic event? No  Have you had any of the following? Recurrent or intrusive thoughts or dreams of the trauma? Yes, nightmares once per week  Flashbacks? No   Have you lost awareness of present surroundings during a flashback? No  Psychological distress from external cues that symbolize the trauma? Yes  Inability to remember the traumatic event? No  Persistent negative beliefs or expectations about self, others, or the world? Yes  Persistent, distorted cognitions about the cause of the trauma, blaming self? No  Persistent negative emotional state? No  Diminished interest? Yes  Detachment or estrangement from others? No  Inability to experience positive emotions such as happiness, satisfaction, love? Yes  Irritability or anger outbursts? Yes  Reckless or self-destructive behavior? No  Hypervigilance? Yes  Exaggerated startle response? Yes  Problems with concentration? Yes  Sleep disturbance? Yes  Symptoms lasted 3 days to 1 month following the trauma or longer than one month? Yes year(s)  Symptoms caused significant distress or impairment in social, occupational, or other functioning? Yes    Motivational Interviewing and Cognitive Behavioral Therapy utilized, including behavioral modification, insight oriented, and supportive therapy. Patient is encouraged to utilize nonpharmacologic coping skills such as deep breathing, guided imagery, guided meditation, muscle relaxation, calming music, and/or journaling. Records review of communications , labs, and medications from an internal/external source completed. PSYCHIATRIC HISTORY:  Patient has had prior care with the following:    [] Psychiatrist      [] Psychologist    [x] Other Therapist Dr. Guilherme Crocker for past 1 year, 1-2 times per month. Also when she was in her 20's saw a therapist    [] None    The patient has had 0 lifetime suicide attempts. Methods used for the suicide attempts include N/A. The patient's most recent suicide attempt was N/A.   Patient reports 0 psych hospital admissions  Last psychiatric admission N/A    Patient endorses currently taking the following psychiatric medications: Paxil 20mg (started one week ago, 12/2/22), hydroxyzine 25mg prn anxiety  Past psychiatric medications include: Buspar for 1 year, hydroxyzine, zoloft, wellbutrin  Patient stated she has been taking oral CBD  Adverse reactions from psychotropic medications: Denies    ALLERGIES:    Gluten meal     Lifetime Psychiatric Review of Systems:       Obsessions and Compulsions: denies     Melita or Hypomania: denies     Panic or Anxiety Attacks:  yes - multiple times per day prior to Paxil, over past few days, she endorses anxiety attacks once per day     Hallucinations:  denies     Delusions: denies     Phobias:  denies     Body or Vocal Tics: denies    SOCIAL HISTORY:  Patient was born in  [place], [place]  and raised in [place] by her biological parents   Residence:  [facility] with  and children  Children:  3, ages 22,17,12  Marital Status:   x1  Level of Education:  graduated high school   Occupation:  part time at [employer]  Patient Assets/Support system:   , neighbors, [employer]   Endorsed coping skills: being in nature, take walks, being around animals  The patient endorses feeling safe at home Yes    Drug Use History:  Tobacco Use Denies  Alcohol Use  Denies   Drug Use  Endorses using CBD oil every day for past 1 week    Legal History:  History of Incarceration: no    Social History     Socioeconomic History    Marital status:      Spouse name: Chau    Number of children: 3    Years of education: 14    Highest education level: Some college, no degree   Occupational History    Not on file   Tobacco Use    Smoking status: Never    Smokeless tobacco: Never   Substance and Sexual Activity    Alcohol use: No    Drug use: No    Sexual activity: Not on file   Other Topics Concern    Not on file   Social History Narrative    Not on file     Social Determinants of Health     Financial Resource Strain: Low Risk     Difficulty of Paying Living Expenses: Not very hard   Food Insecurity: No Food Insecurity    Worried About Running Out of Food in the Last Year: Never true    Ran Out of Food in the Last Year: Never true   Transportation Needs: Not on file   Physical Activity: Not on file   Stress: Not on file Social Connections: Not on file   Intimate Partner Violence: Not on file   Housing Stability: Not on file       FAMILY HISTORY:   Family History   Problem Relation Age of Onset    High Cholesterol Father     Colon Polyps Father     Breast Cancer Maternal Aunt     Colon Cancer Paternal Aunt     Cervical Cancer Maternal Grandmother     Lung Cancer Maternal Grandmother     Esophageal Cancer Maternal Grandmother     Rectal Cancer Child         age 21    Uterine Cancer Maternal Aunt        PSYCHIATRIC FAMILY HISTORY:  Mother with anxiety, father with anxiety, grandmother with anxiety  Suicides in family:yes - aunt, had MS, alcoholism  Substance use in family: yes - alcoholism - maternal grandmother, aunt and 2 uncles    PAST MEDICAL HISTORY:    Past Medical History:   Diagnosis Date    Celiac disease     Hypothyroidism        PAST SURGICAL HISTORY:    Past Surgical History:   Procedure Laterality Date    ANUS SURGERY  2000    Perianal Fissure Repair    APPENDECTOMY  2000    COLONOSCOPY  July 2013,2021    OTHER SURGICAL HISTORY      pelvic embolization    UPPER GASTROINTESTINAL ENDOSCOPY  November 2013,2021       PREVIOUSMEDICATIONS:  Outpatient Medications Prior to Visit   Medication Sig Dispense Refill    PARoxetine (PAXIL) 20 MG tablet       Cholecalciferol (VITAMIN D3) 50 MCG (2000 UT) CAPS Take 1 capsule by mouth daily Skip Sundays      clindamycin (CLEOCIN T) 1 % external solution Apply topically as needed  3    Lysine 500 MG TABS Take 500 mg by mouth 4 times daily      Ascorbic Acid (VITAMIN C) 1000 MG tablet Take 1,000 mg by mouth 4 times daily       Menaquinone-7 (VITAMIN K2) 100 MCG CAPS Take 1 capsule by mouth 2 times daily      TAZORAC 0.1 % cream Apply as needed nightly      NONFORMULARY Progesterone compounded capsule- 1 capsule daily on last half of cycle      Specialty Vitamins Products (MAGNESIUM, AMINO ACID CHELATE,) 133 MG tablet Take 1 tablet by mouth 4 times daily.       ferrous sulfate 325 (65 FE) MG tablet Take 325 mg by mouth nightly. Omega-3 1400 MG CAPS Take 2 capsules by mouth 6 times daily [facility]  Or NOW1 before and as finish meals and at bedtime      Cinnamon 500 MG CAPS Take 1 capsule by mouth 4 times daily (before meals and nightly). Magnesium Citrate 100 MG TABS Take 3 tablets by mouth daily       THYROID PO Compound Thyroid 40 mcg/12 mcg Take 1 capsule by mouth daily      DHEA 10 MG TABS Take 20 mg by mouth daily Skip Mon Thur, if does not feel good, go to 10 mg per day and double Mon Wed Fri      QUERCETIN PO Take 1 capsule by mouth daily as needed (allergies). B Complex Vitamins (VITAMIN B COMPLEX) TABS Take 1 tablet by mouth 3 times daily (before meals) [employer], or natures made       No facility-administered medications prior to visit. REVIEW OF SYSTEMS:    Review of Systems   Constitutional:  Positive for appetite change and fatigue. Psychiatric/Behavioral:  Positive for agitation, decreased concentration, dysphoric mood and sleep disturbance. The patient is nervous/anxious. All other systems reviewed and are negative. The patient sees Shanique Mirza DO as her primary care provider. SPECIALISTS: Dr. Natalie Dudley for hormone balance, Dr. Fatou Corrigan for Celiac disease    OBJECTIVE DATA     There were no vitals taken for this visit.     Pain Level: no    Wt Readings from Last 3 Encounters:   10/26/22 135 lb 6.4 oz (61.4 kg)   04/13/22 143 lb (64.9 kg)   06/29/21 135 lb (61.2 kg)        Physical Exam     Mental Status Exam:   Level of consciousness:  within normal limits  Appearance:  in chair and good grooming   Behavior/Motor:  no abnormalities noted  Attitude toward examiner:  cooperative, attentive, and good eye contact  Speech:  spontaneous, normal rate, and normal volume  Mood:  \"nervous\" per patient,     Depressed and Sad  Affect:  mood congruent  Thought processes:  linear, goal directed, coherent, and circumstantial  Thought content:  Denies homicidal ideation  Suicidal Ideation:  denies suicidal ideation  Delusions:  no evidence of delusions  Perceptual Disturbance:  denies any perceptual disturbance  Cognition: Patient is oriented to person, place, time and situation  Concentration: poor  Memory: limited  Insight & Judgement: limited   Medication Side Effects: Absent  Attention Span: Attention span and concentration were age appropriate      Screenings Completed in This Encounter:     Anxiety and Depression:      DONAVON-7 SCREENING 12/12/2022   Feeling nervous, anxious, or on edge Nearly every day   Not being able to stop or control worrying More than half the days   Worrying too much about different things More than half the days   Trouble relaxing More than half the days   Being so restless that it is hard to sit still More than half the days   Becoming easily annoyed or irritable More than half the days   Feeling afraid as if something awful might happen Several days   DONAVON-7 Total Score 14   How difficult have these problems made it for you to do your work, take care of things at home, or get along with other people? Very difficult           PHQ-2 Over the past 2 weeks, how often have you been bothered by any of the following problems? Little interest or pleasure in doing things: Several days  Feeling down, depressed, or hopeless: More than half the days  PHQ-2 Score: 3  PHQ-9 Over the past 2 weeks, how often have you been bothered by any of the following problems? Trouble falling or staying asleep, or sleeping too much: Nearly every day  Feeling tired or having little energy: More than half the days  Poor appetite or overeating: More than half the days  Feeling bad about yourself - or that you are a failure or have let yourself or your family down: Not at all  Trouble concentrating on things, such as reading the newspaper or watching television: Nearly every day  Moving or speaking so slowly that other people could have noticed.  Or the opposite - being so fidgety or restless that you have been moving around a lot more than usual: Nearly every day  Thoughts that you would be better off dead, or of hurting yourself in some way: Not at all  If you checked off any problems, how difficult have these problems made it for you to do your work, take care of things at home, or get along with other people?: Very difficult  PHQ-9 Total Score: 16  PHQ-9 Total Score: 16    DIAGNOSIS AND ASSESSMENT DATA     DSM-5 DIAGNOSIS:   1. MDD (major depressive disorder), recurrent episode, moderate ([facility] [address]    2. DONAVON (generalized anxiety disorder)    3. Insomnia, persistent      Rule Out Adjustment Disorder    Psychosocial and Contextual Factors[de-identified]  Financial  Occupational  Relationships  Legal  Living situation  Educational      PLAN   Follow-up:  Return in about 2 weeks (around 12/26/2022) for anxiety, depression, medication management.   Continue Paxil 20mg daily for depression and anxiety  Continue Hydroxyzine 25mg prn up to three times per day  Consider psychotherapy  Exercise 30 minutes 3-4 times per week  Increase fluids, drink at least 8 glasses of water daily, no caffeine after 3pm  Sleep hygiene  Healthy diet  Obtain labs prior to next appointment: CBC, CMP, Lipid panel, Hepatic Function Panel, TSH, Vitamin D, Vitamin B12, ferritin, folate  Patient goal:       Prescriptions for this encounter:  New Prescriptions    HYDROXYZINE HCL (ATARAX) 25 MG TABLET    Take 1 tablet by mouth 3 times daily as needed for Anxiety (and insomnia)       Orders Placed This Encounter   Medications    PARoxetine (PAXIL) 20 MG tablet     Sig: Take 1 tablet by mouth every morning     Dispense:  30 tablet     Refill:  1    DISCONTD: hydrOXYzine HCl (ATARAX) 50 MG tablet     Sig: Take 1 tablet by mouth 3 times daily as needed for Anxiety (and insomnia)     Dispense:  90 tablet     Refill:  0    hydrOXYzine HCl (ATARAX) 25 MG tablet     Sig: Take 1 tablet by mouth 3 times daily as needed for Anxiety (and insomnia) Dispense:  90 tablet     Refill:  0         Medications Discontinued During This Encounter   Medication Reason    Menaquinone-7 (VITAMIN K2) 100 MCG CAPS LIST CLEANUP    PARoxetine (PAXIL) 20 MG tablet     hydrOXYzine HCl (ATARAX) 50 MG tablet        Additional orders:  No orders of the defined types were placed in this encounter. Antidepressant Medications: We discussed the risks/benefits and side effects of medications. I stressed in particular side effects including but not limited to gastrointestinal problems, sexual dysfunction, serotonin syndrome, agitation, rare induction of frederick, rare activation of suicidality. We discussed the effects alcohol and illicit substances have on mood, thought process, physical health and interactions with medications. Discussed the side effects of nicotine and caffeine in sleep disturbance and anxiety. The client and I reviewed several treatment options to address his/her symptoms. I explained the risks/benefits of treatment with and without medication. The patient participated in and indicated understanding of the content of our discussion by agreeing to the mutually developed treatment plan. Risks, potential side effects, possible drug-drug interactions, benefits and alternate treatments discussed in detail. All questions answered. Patient stated understanding and is agreeable to treatment plan. Patient has been instructed to seek emergency help via the emergency and/or calling 911 should symptoms become severe, worsen, or with other concerning symptoms. Patient instructed to go immediately to the emergency room and/or call 911 with any suicidal or homicidal ideations or if audio/visual hallucinations develop. Patient stated understanding and agrees. Patient given crisis center information. National Suicide Prevention Lifeline at [employer] (9-684.228.9312) or text HOME to 653537 to access the [address] [place] [address].      I spent a total of  80  minutes with the patient and over half of that time was spent on counseling and coordination of care regarding topics discussed above. I spent 75 minutes with the patient for psychotherapy. The patient was engaged and responsive throughout session. Utilized CBT, MI and reflective listening to address topics above. The remainder of session spent on symptom evaluation and medication management. Provider Signature:  Electronically signed by JESSICA Quinn CNP on 12/12/2022 at 10:44 AM    **This report has been created using voice recognition software. It may contain minor errors which are inherent in voice recognition technology. **     Carmenza Harness, was evaluated through a synchronous (real-time) audio-video encounter. The patient (or guardian if applicable) is aware that this is a billable service, which includes applicable co-pays. This Virtual Visit was conducted with patient's (and/or legal guardian's) consent. The visit was conducted pursuant to the emergency declaration under the [address] [place] [address], [address] [place] [address] authority and the [employer] and [employer] General Act. Patient identification was verified, and a caregiver was present when appropriate. The patient was located at Home: [address]. Provider was located at Home ([address] [address]): [place]. Total time spent for this encounter: Not billed by time    --JESSICA Quinn CNP on 12/12/2022 at 10:44 AM    An electronic signature was used to authenticate this note.

## 2022-11-23 ENCOUNTER — TELEMEDICINE (OUTPATIENT)
Dept: PSYCHOLOGY | Age: 52
End: 2022-11-23
Payer: COMMERCIAL

## 2022-11-23 DIAGNOSIS — F43.23 ADJUSTMENT DISORDER WITH MIXED ANXIETY AND DEPRESSED MOOD: Primary | ICD-10-CM

## 2022-11-23 PROCEDURE — 90837 PSYTX W PT 60 MINUTES: CPT | Performed by: PSYCHOLOGIST

## 2022-11-23 NOTE — PROGRESS NOTES
Psychotherapy Note  Tommy Oscar. Domenic Ziegler Psy.D. Visit Date:  11/23/2022    Patient:  Lynn Concepcion  YOB: 1970  Chief Complaint:  Follow-up, Anxiety, and Stress      Duration of session:  60 minutes      S:     Ct said Juli Mars was able to see Dr. Makenzie Montes, and she thinks it will be a good fit for him. She shared that a woman from their parish whom they've never met offered to take Heriberto's burden and suffer for him, which has actually made him better, making her sick. We talked about limits of control and how she can manage her anxiety better. We talked about the 4 temperaments and how she could conceptualize those in her life, and how she could try and relate to Juli Mars better based on his temperament style as well. We processed thoughts and feelings about the situation. O:    Appearance    Patient presents as alert, oriented, and cooperative, tearful with mild distress  Appetite normal  Sleep disturbance Some  Loss of pleasure Some  Speech    clear and understandable  Mood    Dysthymic  Affect    anxiety  Thought Process    linear and coherent  Insight    Good  Judgment    Intact  Memory    recent and remote memory intact  Suicide Assessment    no suicidal ideation      A:    1. Adjustment disorder with mixed anxiety and depressed mood        Ct needing supportive psychotherapy to address stress, anxiety, coping, through the use of mindfulness and relying on the strength of her nikhil. P:    Meet again in 2-4 weeks. Lynn Concepcion, was evaluated through a synchronous (real-time) audio-video encounter. The patient (or guardian if applicable) is aware that this is a billable service, which includes applicable co-pays. This Virtual Visit was conducted with patient's (and/or legal guardian's) consent.  The visit was conducted pursuant to the emergency declaration under the [facility] waiver authority and the [place] [employer] and Response Supplemental Appropriations Act. Patient identification was verified, and a caregiver was present when appropriate. The patient was located at Home: [address]. Provider was located at Home ([address] [address]): [place]. All questions about treatment plan answered. Patient instructed to go immediately to the emergency room and/or call 911 if any suicidal or homicidal ideations. Patient stated understanding and is agreeable to treatment and crisis plan.         Provider Signature:  Electronically signed by Jake Acosta PSYD on 11/23/2022 at 11:05 AM

## 2022-12-02 ENCOUNTER — OFFICE VISIT (OUTPATIENT)
Dept: PSYCHOLOGY | Age: 52
End: 2022-12-02

## 2022-12-02 DIAGNOSIS — F43.23 ADJUSTMENT DISORDER WITH MIXED ANXIETY AND DEPRESSED MOOD: Primary | ICD-10-CM

## 2022-12-02 NOTE — PROGRESS NOTES
Psychotherapy Note  Lisandro Guillaume. Merna Matias Psy.D. Visit Date:  12/2/2022    Patient:  Walter Peace  YOB: 1970  Chief Complaint:  Follow-up, Depression, and Stress      Duration of session:  60 minutes      S:     Ct talked about how she's handling things with Roland Gagnon, mostly things are better. She struggles with appropriate expectations and patience. Her outlook on things is good. We talked about her nikhil being a stabilizing force in her life. We talked about ways to encourage gratitude and mindfulness skills to increase flexibility and adaptability. O:    Appearance    Patient presents as alert, oriented, and cooperative, tearful with mild distress  Appetite normal  Sleep disturbance Some  Loss of pleasure Some  Speech    clear and understandable  Mood    Dysthymic  Affect    anxiety  Thought Process    linear and coherent  Insight    Good  Judgment    Intact  Memory    recent and remote memory intact  Suicide Assessment    no suicidal ideation      A:    1. Adjustment disorder with mixed anxiety and depressed mood      Ct needing supportive psychotherapy to address stress, anxiety, coping, through the use of mindfulness and relying on the strength of her nikhil. P:    MyChart visit in 2-4 weeks. All questions about treatment plan answered. Patient instructed to go immediately to the emergency room and/or call 911 if any suicidal or homicidal ideations. Patient stated understanding and is agreeable to treatment and crisis plan.         Provider Signature:  Electronically signed by Raleigh Anguiano PSYD on 12/2/2022 at 10:00 AM

## 2022-12-02 NOTE — PATIENT INSTRUCTIONS
Try this method of journaling:     -4 things that went well today   -3 things you are thankful for   -2 things that didn't go as well today but you can learn from   -1 thing you are looking forward to      Mindfulness Attitudes that provide foundation for healthier living  From the work of Rosey Lopez, PhD, in Full Catastrophe Living    1. Non-Judging   Taking the stance of an impartial witness to your own experience. Noticing the stream of judging mind . . good / bad / neutral not trying to  stop it but just being aware of it. 2. Patience   Letting things unfold in their own time   A child may try to help a butterfly emerge by breaking open a chrysalis but  chances are the butterfly wont benefit from this help. Practising patience with ourselves. Why rush through some moments in  order to get to other better ones? Each one is your life in that moment.    Being completely open to each moment, accepting its fullness, knowing that  like the butterfly, things will emerge in their own time. 3. Beginners Mind   Too often we let our thinking and our beliefs about what we know stop us  from seeing things as they really are. Cultivating a mind that is willing to see everything as if for the first time. Being receptive to new possibilities not getting stuck in a rut of our own  expertise. Each moment is unique and contains unique possibilities. Try it with someone you know - next time, ask yourself if you are seeing  this person with fresh eyes, as he/she really is? Try it with problems with  the jazlyn with the dog with the man in the corner shop. [address] a basic trust in yourself and your feelings. Trusting in your own authority and intuition, even if you make some  mistakes along the way. Honour your feelings. Taking responsibility for yourself and your own wellbeing. 5. Non-Striving   Meditation has no goal other than for you to be yourself.  The irony is you  already are.   Paying attention to how you are right now - however that it is. Just watch. The best way to achieve your own goals is to back off from striving and  instead start to really focus on carefully seeing and accepting things as they  are, moment by moment. With patience and regular practice, movement  towards your goals will take place by itself. 6. Acceptance   Seeing things as they actually are in the present. If you have a headache,  accept you have a headache. Meditation Maintenance: A Follow on Course © [facility]   We often waste a lot of time and energy denying what is fact. We are trying  to force situations to how we would like them to be. This creates more  tension and prevents positive change occurring. Now is the only time we have for anything. You have to accept yourself as  you are before you can really change. Acceptance is not passive; it does not mean you have to like everything and  abandon your principles and values. It does not mean you have to be  resigned to tolerating things. It does not mean that you should stop trying to  break free of your own self-destructive habits or give up your desire to  change and grow. Acceptance is a willingness to see things as they are. You are much more  likely to know what to do and have an inner conviction to act when you have  a clear picture of what is actually happening. 7. Letting Go   Letting go is a way of letting things be, of accepting things as they are. We let things go and we just watch   If we find it particularly difficult to let go of something because it has such a  strong hold on our mind, we can direct our attention to what holding feels  like. Holding on is the opposite of letting go. Being willing to look at the  ways we hold on shows a lot about its opposite. You already know how to let go Every night when we go to sleep we let go of our bodily sensations, thoughts, worries.

## 2022-12-12 ENCOUNTER — TELEMEDICINE (OUTPATIENT)
Dept: PSYCHIATRY | Age: 52
End: 2022-12-12

## 2022-12-12 DIAGNOSIS — G47.00 INSOMNIA, PERSISTENT: ICD-10-CM

## 2022-12-12 DIAGNOSIS — F33.1 MDD (MAJOR DEPRESSIVE DISORDER), RECURRENT EPISODE, MODERATE (HCC): Primary | ICD-10-CM

## 2022-12-12 DIAGNOSIS — F41.1 GAD (GENERALIZED ANXIETY DISORDER): ICD-10-CM

## 2022-12-12 RX ORDER — HYDROXYZINE HYDROCHLORIDE 25 MG/1
25 TABLET, FILM COATED ORAL 3 TIMES DAILY PRN
Qty: 90 TABLET | Refills: 0 | Status: SHIPPED | OUTPATIENT
Start: 2022-12-12

## 2022-12-12 RX ORDER — PAROXETINE HYDROCHLORIDE 20 MG/1
TABLET, FILM COATED ORAL
COMMUNITY
Start: 2022-12-02 | End: 2022-12-12

## 2022-12-12 RX ORDER — PAROXETINE HYDROCHLORIDE 20 MG/1
20 TABLET, FILM COATED ORAL EVERY MORNING
Qty: 30 TABLET | Refills: 1 | Status: SHIPPED | OUTPATIENT
Start: 2022-12-12

## 2022-12-12 RX ORDER — HYDROXYZINE 50 MG/1
50 TABLET, FILM COATED ORAL 3 TIMES DAILY PRN
Qty: 90 TABLET | Refills: 0 | Status: SHIPPED | OUTPATIENT
Start: 2022-12-12 | End: 2022-12-12

## 2022-12-12 ASSESSMENT — PATIENT HEALTH QUESTIONNAIRE - PHQ9
SUM OF ALL RESPONSES TO PHQ9 QUESTIONS 1 & 2: 3
10. IF YOU CHECKED OFF ANY PROBLEMS, HOW DIFFICULT HAVE THESE PROBLEMS MADE IT FOR YOU TO DO YOUR WORK, TAKE CARE OF THINGS AT HOME, OR GET ALONG WITH OTHER PEOPLE: 2
5. POOR APPETITE OR OVEREATING: 2
SUM OF ALL RESPONSES TO PHQ QUESTIONS 1-9: 16
3. TROUBLE FALLING OR STAYING ASLEEP: 3
4. FEELING TIRED OR HAVING LITTLE ENERGY: 2
SUM OF ALL RESPONSES TO PHQ QUESTIONS 1-9: 16
7. TROUBLE CONCENTRATING ON THINGS, SUCH AS READING THE NEWSPAPER OR WATCHING TELEVISION: 3
SUM OF ALL RESPONSES TO PHQ QUESTIONS 1-9: 16
9. THOUGHTS THAT YOU WOULD BE BETTER OFF DEAD, OR OF HURTING YOURSELF: 0
1. LITTLE INTEREST OR PLEASURE IN DOING THINGS: 1
2. FEELING DOWN, DEPRESSED OR HOPELESS: 2
6. FEELING BAD ABOUT YOURSELF - OR THAT YOU ARE A FAILURE OR HAVE LET YOURSELF OR YOUR FAMILY DOWN: 0
8. MOVING OR SPEAKING SO SLOWLY THAT OTHER PEOPLE COULD HAVE NOTICED. OR THE OPPOSITE, BEING SO FIGETY OR RESTLESS THAT YOU HAVE BEEN MOVING AROUND A LOT MORE THAN USUAL: 3
SUM OF ALL RESPONSES TO PHQ QUESTIONS 1-9: 16

## 2022-12-12 ASSESSMENT — ANXIETY QUESTIONNAIRES
1. FEELING NERVOUS, ANXIOUS, OR ON EDGE: 3
7. FEELING AFRAID AS IF SOMETHING AWFUL MIGHT HAPPEN: 1
5. BEING SO RESTLESS THAT IT IS HARD TO SIT STILL: 2
3. WORRYING TOO MUCH ABOUT DIFFERENT THINGS: 2
GAD7 TOTAL SCORE: 14
2. NOT BEING ABLE TO STOP OR CONTROL WORRYING: 2
6. BECOMING EASILY ANNOYED OR IRRITABLE: 2
IF YOU CHECKED OFF ANY PROBLEMS ON THIS QUESTIONNAIRE, HOW DIFFICULT HAVE THESE PROBLEMS MADE IT FOR YOU TO DO YOUR WORK, TAKE CARE OF THINGS AT HOME, OR GET ALONG WITH OTHER PEOPLE: VERY DIFFICULT
4. TROUBLE RELAXING: 2

## 2022-12-21 ENCOUNTER — TELEMEDICINE (OUTPATIENT)
Dept: PSYCHOLOGY | Age: 52
End: 2022-12-21
Payer: COMMERCIAL

## 2022-12-21 DIAGNOSIS — F43.23 ADJUSTMENT DISORDER WITH MIXED ANXIETY AND DEPRESSED MOOD: Primary | ICD-10-CM

## 2022-12-21 PROCEDURE — 90834 PSYTX W PT 45 MINUTES: CPT | Performed by: PSYCHOLOGIST

## 2022-12-21 NOTE — PROGRESS NOTES
Psychotherapy Note  Magnolia Alegre. Tiffanie Estrada Psy.D. Visit Date:  12/21/2022    Patient:  Juan Antonio Dudley  YOB: 1970  Chief Complaint:  Follow-up, Depression, Anxiety, and Stress      Duration of session:  45 minutes      S:     Ct said she saw [facility] for her first appt last week. They decided to stay on the Paxil and also hydroxyzine as needed for anxiety. She talked about Asha Brush having a panic attack a week or two ago after he had fecal incontinence while driving. He called ct who was out of town at a dinner with her . She was able to help him calm down and once he got home she could enjoy the rest of the evening. She has bee making an effort to practice gratitude and been sleeping well. She knows she can take the hydroxyzine to manage her anxiety. She has been practicing mindfulness meditation at times. O:    Appearance    Patient presents as alert, oriented, and cooperative, tearful with mild distress  Appetite normal  Sleep disturbance Some  Loss of pleasure Some  Speech    clear and understandable  Mood    Dysthymic  Affect    anxiety  Thought Process    linear and coherent  Insight    Good  Judgment    Intact  Memory    recent and remote memory intact  Suicide Assessment    no suicidal ideation      A:    1. Adjustment disorder with mixed anxiety and depressed mood        Ct needing supportive psychotherapy to address stress, anxiety, coping, through the use of mindfulness and relying on the strength of her nikhil. P:    Meet again in 2-4 weeks. Juan Antonio Dudley, was evaluated through a synchronous (real-time) audio-video encounter. The patient (or guardian if applicable) is aware that this is a billable service, which includes applicable co-pays. This Virtual Visit was conducted with patient's (and/or legal guardian's) consent.  The visit was conducted pursuant to the emergency declaration under the [address], [address]5 waiver authority and the [employer] Act. Patient identification was verified, and a caregiver was present when appropriate. The patient was located at Home: [address]. Provider was located at Home ([place] [address]): [place]. All questions about treatment plan answered. Patient instructed to go immediately to the emergency room and/or call 911 if any suicidal or homicidal ideations. Patient stated understanding and is agreeable to treatment and crisis plan.         Provider Signature:  Electronically signed by Shawna Wolf PSYD on 12/21/2022 at 9:08 AM

## 2022-12-27 NOTE — PROGRESS NOTES
[address] [address]  Dept: [phone]  Dept Fax: [phone]  Loc: [phone]    Visit Date: 12/30/2022    SUBJECTIVE DATA       CHIEF COMPLAINT:    Chief Complaint   Patient presents with    Follow-up    Medication Check    Medication Refill    Mental Health Problem          History obtained from: patient    HISTORY OF PRESENT ILLNESS:    Carmenza Llanos is a 47 y.o. female who presents to the office for follow up with medication management. HPI  Patient presents calm and cooperative. Patient states feeling \"good\" today. Patient stated medication is working, symptoms improving. Patient stated she is sleeping better with the Paxil. Patient stated she is able to relax easier than before, and able to push worries away easier. She stated she has some anxiety in mornings, but not as much as previously, she is able to lay there and rest in morning, not feeling she has to \"jump out of bed\". Patient stated the hydroxyzine is working, only took it a few times. She stated the Paxil made her fatigued at first, however improving. Discussed medication management with patient. Patient agreed with plan to continue medications at same doses for full effects, as symptoms improving. Patient rates depression 2/10 and anxiety 4/10 today on a scale of 0-10 with 10 being the worst.  Sleep- able to fall asleep, able to stay asleep, feeling rested. Patient stated in the past 3 days, she is sleeping better though since starting Paxil.    Interest- improving  Energy and motivation - improving  Concentration - improving  Memory -poor  Appetite- improving  Endorses some irritability, improving  Endorses some restlessness, improving  Denies hopelessness, helplessness, and worthlessness  Denies suicidal or homicidal ideation, plan, or intent  Denies auditory, visual, or other hallucinations    Patient previously reported having had hallucinations with postpartum depression after first child    New medical conditions or psychiatric admissions since seen by this provider last? Denies  Patient endorses currently taking the following psychiatric medications: Paxil 20mg, Hydroxyzine 25mg TID prn (only taken a few times)   Past psychiatric medications include: Buspar for 1 year, hydroxyzine, zoloft, wellbutrin  Patient stated she has been taking oral CBD  Adverse reactions from psychotropic medications:  Denies  Patient Assets/Support system:    , neighbors, Yazidi   Endorsed coping skills: being in nature, take walks, being around animals  The patie nt endorses feeling safe at home Yes  Current Substance Use: Endorses using CBD oil every day for past 1 week    Motivational Interviewing and Cognitive Behavioral Therapy utilized, including behavioral modification, insight oriented, and supportive therapy. Patient is encouraged to utilize nonpharmacologic coping skills such as deep breathing, guided imagery, guided meditation, muscle relaxation, calming music, and/or journaling. Records review of communications , labs, and medications from an internal/external source completed.     ALLERGIES:    Gluten meal     PAST MEDICAL HISTORY:    Past Medical History:   Diagnosis Date    Celiac disease     Hypothyroidism        PREVIOUSMEDICATIONS:  Outpatient Medications Prior to Visit   Medication Sig Dispense Refill    hydrOXYzine HCl (ATARAX) 25 MG tablet Take 1 tablet by mouth 3 times daily as needed for Anxiety (and insomnia) 90 tablet 0    PARoxetine (PAXIL) 20 MG tablet Take 1 tablet by mouth every morning 30 tablet 1    Cholecalciferol (VITAMIN D3) 50 MCG (2000 UT) CAPS Take 1 capsule by mouth daily Skip Sundays      clindamycin (CLEOCIN T) 1 % external solution Apply topically as needed  3    Lysine 500 MG TABS Take 500 mg by mouth 4 times daily      Ascorbic Acid (VITAMIN C) 1000 MG tablet Take 1,000 mg by mouth 4 times daily TAZORAC 0.1 % cream Apply as needed nightly      NONFORMULARY Progesterone compounded capsule- 1 capsule daily on last half of cycle      Specialty Vitamins Products (MAGNESIUM, AMINO ACID CHELATE,) 133 MG tablet Take 1 tablet by mouth 4 times daily. ferrous sulfate 325 (65 FE) MG tablet Take 325 mg by mouth nightly. Omega-3 1400 MG CAPS Take 2 capsules by mouth 6 times daily [facility]  Or NOW1 before and as finish meals and at bedtime      Cinnamon 500 MG CAPS Take 1 capsule by mouth 4 times daily (before meals and nightly). Magnesium Citrate 100 MG TABS Take 3 tablets by mouth daily       THYROID PO Compound Thyroid 40 mcg/12 mcg Take 1 capsule by mouth daily      DHEA 10 MG TABS Take 20 mg by mouth daily Skip Mon Thur, if does not feel good, go to 10 mg per day and double Mon Wed Fri      QUERCETIN PO Take 1 capsule by mouth daily as needed (allergies). B Complex Vitamins (VITAMIN B COMPLEX) TABS Take 1 tablet by mouth 3 times daily (before meals) [employer], or natures made       No facility-administered medications prior to visit. REVIEW OF SYSTEMS:    Review of Systems   Constitutional:  Positive for fatigue. Psychiatric/Behavioral:  Positive for agitation, decreased concentration and dysphoric mood. The patient is nervous/anxious. All other systems reviewed and are negative. The patient sees Jinny Montes De Oca DO as her primary care provider.     OBJECTIVE DATA     Wt Readings from Last 3 Encounters:   10/26/22 135 lb 6.4 oz (61.4 kg)   04/13/22 143 lb (64.9 kg)   06/29/21 135 lb (61.2 kg)        Mental Status Exam:   Level of consciousness:  within normal limits  Appearance:  in chair and good grooming   Behavior/Motor:  no abnormalities noted  Attitude toward examiner:  cooperative, attentive, and good eye contact  Speech:  spontaneous, normal rate, and normal volume  Mood:  \"good\" per patient  Affect:  mood congruent  Thought processes:  linear, goal directed, coherent, and circumstantial  Thought content:  Denies homicidal ideation  Suicidal Ideation:  denies suicidal ideation  Delusions:  no evidence of delusions  Perceptual Disturbance:  denies any perceptual disturbance  Cognition: Patient is oriented to person, place, time and situation  Concentration: poor  Memory: limited  Insight & Judgement: limited   Medication Side Effects: Absent  Attention Span: Attention span and concentration were age appropriate    Screenings Completed in This Encounter:     Anxiety and Depression:      DONAVON-7 SCREENING 12/30/2022 12/12/2022   Feeling nervous, anxious, or on edge Several days Nearly every day   Not being able to stop or control worrying Several days More than half the days   Worrying too much about different things Several days More than half the days   Trouble relaxing Several days More than half the days   Being so restless that it is hard to sit still Several days More than half the days   Becoming easily annoyed or irritable Several days More than half the days   Feeling afraid as if something awful might happen Several days Several days   DONAVON-7 Total Score 7 14   How difficult have these problems made it for you to do your work, take care of things at home, or get along with other people? Somewhat difficult Very difficult           PHQ-2 Over the past 2 weeks, how often have you been bothered by any of the following problems? Little interest or pleasure in doing things: Several days  Feeling down, depressed, or hopeless: Several days  PHQ-2 Score: 2  PHQ-9 Over the past 2 weeks, how often have you been bothered by any of the following problems?   Trouble falling or staying asleep, or sleeping too much: Several days  Feeling tired or having little energy: Several days  Poor appetite or overeating: Not at all  Feeling bad about yourself - or that you are a failure or have let yourself or your family down: Not at all  Trouble concentrating on things, such as reading the newspaper or watching television: Several days  Moving or speaking so slowly that other people could have noticed. Or the opposite - being so fidgety or restless that you have been moving around a lot more than usual: Several days  Thoughts that you would be better off dead, or of hurting yourself in some way: Not at all  If you checked off any problems, how difficult have these problems made it for you to do your work, take care of things at home, or get along with other people?: Somewhat difficult  PHQ-9 Total Score: 6  PHQ-9 Total Score: 6       DIAGNOSIS AND ASSESSMENT DATA     DSM-5 DIAGNOSIS:   1. MDD (major depressive disorder), recurrent episode, moderate ([facility] [address]    2. DONAVON (generalized anxiety disorder)    3. Insomnia, persistent      Rule Out Adjustment Disorder    Psychosocial and Contextual Factors[de-identified]  Financial  Occupational  Relationships  Legal  Living situation  Educational    PLAN   Follow-up:  Return in about 3 weeks (around 1/20/2023) for anxiety, depression, medication management. Continue Paxil 20mg daily for depression and anxiety  Continue Hydroxyzine 25mg prn up to three times per day for anxiety  Consider psychotherapy  Exercise 30 minutes 3-4 times per week  Increase fluids, drink at least 8 glasses of water daily, no caffeine after 3pm  Sleep hygiene  Healthy diet    Prescriptions for this encounter:  New Prescriptions    No medications on file       Orders Placed This Encounter   Medications    PARoxetine (PAXIL) 20 MG tablet     Sig: Take 1 tablet by mouth every morning     Dispense:  30 tablet     Refill:  2         Medications Discontinued During This Encounter   Medication Reason    PARoxetine (PAXIL) 20 MG tablet REORDER       Additional orders:  No orders of the defined types were placed in this encounter. Antidepressant Medications: We discussed the risks/benefits and side effects of medications.  I stressed in particular side effects including but not limited to gastrointestinal problems, sexual dysfunction, serotonin syndrome, agitation, rare induction of frederick, rare activation of suicidality. We discussed the effects alcohol and illicit substances have on mood, thought process, physical health and interactions with medications. Discussed the side effects of nicotine and caffeine in sleep disturbance and anxiety. The client and I reviewed several treatment options to address his/her symptoms. I explained the risks/benefits of treatment with and without medication. The patient participated in and indicated understanding of the content of our discussion by agreeing to the mutually developed treatment plan. Risks, potential side effects, possible drug-drug interactions, benefits and alternate treatments discussed in detail. All questions answered. Patient stated understanding and is agreeable to treatment plan. Patient has been instructed to seek emergency help via the emergency and/or calling 911 should symptoms become severe, worsen, or with other concerning symptoms. Patient instructed to go immediately to the emergency room and/or call 911 with any suicidal or homicidal ideations or if audio/visual hallucinations develop. Patient stated understanding and agrees. Patient given crisis center information. National Suicide Prevention Lifeline at #291  or 1-800-273-talk (1-775.287.4043) or text HOME to 289812 to access the [facility]. I spent a total of 30 minutes with the patient and over half of that time was spent on counseling and coordination of care regarding topics discussed above. I spent 25 minutes with the patient for psychotherapy. The patient was engaged and responsive throughout session. Utilized CBT, MI and reflective listening to address topics above. The remainder of session spent on symptom evaluation and medication management.     Provider Signature:  Electronically signed by JESSICA Lorenzo CNP on 12/30/2022 at 11:37 AM    **This report has been created using voice recognition software. It may contain minor errors which are inherent in voice recognition technology. **     Javier Tubbs, was evaluated through a synchronous (real-time) audio-video encounter. The patient (or guardian if applicable) is aware that this is a billable service, which includes applicable co-pays. This Virtual Visit was conducted with patient's (and/or legal guardian's) consent. The visit was conducted pursuant to the emergency declaration under the [address] [place] [address], [address] [place] [address] authority and the [employer] Act. Patient identification was verified, and a caregiver was present when appropriate. The patient was located at Home: [address]. Provider was located at Home ([place] [address]): [facility]. Total time spent for this encounter: Not billed by time    --JESSICA Guerrero CNP on 12/30/2022 at 11:37 AM    An electronic signature was used to authenticate this note.

## 2022-12-30 ENCOUNTER — TELEMEDICINE (OUTPATIENT)
Dept: PSYCHIATRY | Age: 52
End: 2022-12-30

## 2022-12-30 DIAGNOSIS — G47.00 INSOMNIA, PERSISTENT: ICD-10-CM

## 2022-12-30 DIAGNOSIS — F41.1 GAD (GENERALIZED ANXIETY DISORDER): ICD-10-CM

## 2022-12-30 DIAGNOSIS — F33.1 MDD (MAJOR DEPRESSIVE DISORDER), RECURRENT EPISODE, MODERATE (HCC): Primary | ICD-10-CM

## 2022-12-30 RX ORDER — PAROXETINE HYDROCHLORIDE 20 MG/1
20 TABLET, FILM COATED ORAL EVERY MORNING
Qty: 30 TABLET | Refills: 2 | Status: SHIPPED | OUTPATIENT
Start: 2022-12-30

## 2022-12-30 ASSESSMENT — PATIENT HEALTH QUESTIONNAIRE - PHQ9
8. MOVING OR SPEAKING SO SLOWLY THAT OTHER PEOPLE COULD HAVE NOTICED. OR THE OPPOSITE, BEING SO FIGETY OR RESTLESS THAT YOU HAVE BEEN MOVING AROUND A LOT MORE THAN USUAL: 1
3. TROUBLE FALLING OR STAYING ASLEEP: 1
SUM OF ALL RESPONSES TO PHQ QUESTIONS 1-9: 6
6. FEELING BAD ABOUT YOURSELF - OR THAT YOU ARE A FAILURE OR HAVE LET YOURSELF OR YOUR FAMILY DOWN: 0
2. FEELING DOWN, DEPRESSED OR HOPELESS: 1
SUM OF ALL RESPONSES TO PHQ QUESTIONS 1-9: 6
1. LITTLE INTEREST OR PLEASURE IN DOING THINGS: 1
4. FEELING TIRED OR HAVING LITTLE ENERGY: 1
10. IF YOU CHECKED OFF ANY PROBLEMS, HOW DIFFICULT HAVE THESE PROBLEMS MADE IT FOR YOU TO DO YOUR WORK, TAKE CARE OF THINGS AT HOME, OR GET ALONG WITH OTHER PEOPLE: 1
9. THOUGHTS THAT YOU WOULD BE BETTER OFF DEAD, OR OF HURTING YOURSELF: 0
SUM OF ALL RESPONSES TO PHQ9 QUESTIONS 1 & 2: 2
5. POOR APPETITE OR OVEREATING: 0
SUM OF ALL RESPONSES TO PHQ QUESTIONS 1-9: 6
7. TROUBLE CONCENTRATING ON THINGS, SUCH AS READING THE NEWSPAPER OR WATCHING TELEVISION: 1
SUM OF ALL RESPONSES TO PHQ QUESTIONS 1-9: 6

## 2022-12-30 ASSESSMENT — ANXIETY QUESTIONNAIRES
4. TROUBLE RELAXING: 1
IF YOU CHECKED OFF ANY PROBLEMS ON THIS QUESTIONNAIRE, HOW DIFFICULT HAVE THESE PROBLEMS MADE IT FOR YOU TO DO YOUR WORK, TAKE CARE OF THINGS AT HOME, OR GET ALONG WITH OTHER PEOPLE: SOMEWHAT DIFFICULT
2. NOT BEING ABLE TO STOP OR CONTROL WORRYING: 1
6. BECOMING EASILY ANNOYED OR IRRITABLE: 1
GAD7 TOTAL SCORE: 7
5. BEING SO RESTLESS THAT IT IS HARD TO SIT STILL: 1
3. WORRYING TOO MUCH ABOUT DIFFERENT THINGS: 1
1. FEELING NERVOUS, ANXIOUS, OR ON EDGE: 1
7. FEELING AFRAID AS IF SOMETHING AWFUL MIGHT HAPPEN: 1

## 2023-01-05 DIAGNOSIS — F41.1 GAD (GENERALIZED ANXIETY DISORDER): ICD-10-CM

## 2023-01-05 DIAGNOSIS — F33.1 MDD (MAJOR DEPRESSIVE DISORDER), RECURRENT EPISODE, MODERATE (HCC): ICD-10-CM

## 2023-01-05 DIAGNOSIS — G47.00 INSOMNIA, PERSISTENT: ICD-10-CM

## 2023-01-05 RX ORDER — HYDROXYZINE HYDROCHLORIDE 25 MG/1
25 TABLET, FILM COATED ORAL 3 TIMES DAILY PRN
Qty: 90 TABLET | Refills: 0 | OUTPATIENT
Start: 2023-01-05

## 2023-01-05 RX ORDER — HYDROXYZINE 50 MG/1
50 TABLET, FILM COATED ORAL 3 TIMES DAILY PRN
Qty: 90 TABLET | Refills: 0 | Status: SHIPPED | OUTPATIENT
Start: 2023-01-05

## 2023-01-05 NOTE — TELEPHONE ENCOUNTER
[facility] is requesting a medication refill on Sherrie's behalf for Atarax 50mg;#90 with 0 refills;last with a start and refill date of 12/12/2022. Medication is pending your approval for a 30 day supply with 0 refills; she is scheduled to return on 01/20/23.

## 2023-01-13 ENCOUNTER — OFFICE VISIT (OUTPATIENT)
Dept: PSYCHOLOGY | Age: 53
End: 2023-01-13
Payer: COMMERCIAL

## 2023-01-13 DIAGNOSIS — F43.23 ADJUSTMENT DISORDER WITH MIXED ANXIETY AND DEPRESSED MOOD: Primary | ICD-10-CM

## 2023-01-13 PROCEDURE — 90837 PSYTX W PT 60 MINUTES: CPT | Performed by: PSYCHOLOGIST

## 2023-01-13 NOTE — PATIENT INSTRUCTIONS
Accept the things you cant change  Many sources of stress are unavoidable. You cant prevent or change stressors, such as the death of a loved one, a serious illness, or a national recession. In such cases, the best way to cope with stress is to accept things as they are. Acceptance may be difficult, but in the long run, its easier than railing against a situation you cant change. Dont try to control the uncontrollable. Many things in life are beyond our control--particularly the behavior of other people. Rather than stressing out over them, focus on the things you can control such as the way you choose to react to problems. Look for the upside. When facing major challenges, try to look at them as opportunities for personal growth. If your own poor choices contributed to a stressful situation, reflect on them and learn from your mistakes. Learn to forgive. Accept the fact that we live in an imperfect world and that people make mistakes. Let go of anger and resentments. Free yourself from negative energy by forgiving and moving on.

## 2023-01-13 NOTE — PROGRESS NOTES
Psychotherapy Note  Lolly Morales. Misha Elena Psy.D. Visit Date:  1/13/2023    Patient:  Miguelina Tay  YOB: 1970  Chief Complaint:  Follow-up, Anxiety, and Stress      Duration of session:  60 minutes      S:     Ct has been on the Paxil for a few weeks now, and she's feeling it's helping. Things are good and she's handling things well. She noticed her anxiety was much less as she was waiting for test results, much more calmer than usual.  We talked about how her nikhil has remained strong and positively impacted her life. Things are going well, she's handling them, with God's help. We processed thoughts and feelings about the situation. O:    Appearance    Patient presents as alert, oriented, and cooperative, tearful with mild distress  Appetite normal  Sleep disturbance Some  Loss of pleasure Some  Speech    clear and understandable  Mood    Dysthymic  Affect    anxiety  Thought Process    linear and coherent  Insight    Good  Judgment    Intact  Memory    recent and remote memory intact  Suicide Assessment    no suicidal ideation      A:    1. Adjustment disorder with mixed anxiety and depressed mood      Ct needing supportive psychotherapy to address stress, anxiety, coping, through the use of mindfulness and relying on the strength of her nikhil. P:    Meet again in 2-4 weeks. All questions about treatment plan answered. Patient instructed to go immediately to the emergency room and/or call 911 if any suicidal or homicidal ideations. Patient stated understanding and is agreeable to treatment and crisis plan.         Provider Signature:  Electronically signed by Eduardo Zendejas PSYD on 1/13/2023 at 9:06 AM

## 2023-01-20 ENCOUNTER — TELEMEDICINE (OUTPATIENT)
Dept: PSYCHIATRY | Age: 53
End: 2023-01-20

## 2023-01-20 DIAGNOSIS — G47.00 INSOMNIA, PERSISTENT: ICD-10-CM

## 2023-01-20 DIAGNOSIS — F41.1 GAD (GENERALIZED ANXIETY DISORDER): ICD-10-CM

## 2023-01-20 DIAGNOSIS — F33.1 MDD (MAJOR DEPRESSIVE DISORDER), RECURRENT EPISODE, MODERATE (HCC): Primary | ICD-10-CM

## 2023-01-20 RX ORDER — PAROXETINE HYDROCHLORIDE 20 MG/1
20 TABLET, FILM COATED ORAL EVERY MORNING
Qty: 90 TABLET | Refills: 1 | Status: SHIPPED | OUTPATIENT
Start: 2023-01-20 | End: 2023-07-19

## 2023-01-20 RX ORDER — HYDROXYZINE HYDROCHLORIDE 25 MG/1
25-50 TABLET, FILM COATED ORAL DAILY PRN
Qty: 60 TABLET | Refills: 1 | Status: SHIPPED | OUTPATIENT
Start: 2023-01-20

## 2023-01-20 RX ORDER — PAROXETINE 10 MG/1
5 TABLET, FILM COATED ORAL DAILY
Qty: 45 TABLET | Refills: 1 | Status: SHIPPED | OUTPATIENT
Start: 2023-01-20 | End: 2023-07-19

## 2023-01-20 ASSESSMENT — ANXIETY QUESTIONNAIRES
4. TROUBLE RELAXING: 0
6. BECOMING EASILY ANNOYED OR IRRITABLE: 1
7. FEELING AFRAID AS IF SOMETHING AWFUL MIGHT HAPPEN: 1
GAD7 TOTAL SCORE: 4
5. BEING SO RESTLESS THAT IT IS HARD TO SIT STILL: 1
1. FEELING NERVOUS, ANXIOUS, OR ON EDGE: 1
3. WORRYING TOO MUCH ABOUT DIFFERENT THINGS: 0
2. NOT BEING ABLE TO STOP OR CONTROL WORRYING: 0
IF YOU CHECKED OFF ANY PROBLEMS ON THIS QUESTIONNAIRE, HOW DIFFICULT HAVE THESE PROBLEMS MADE IT FOR YOU TO DO YOUR WORK, TAKE CARE OF THINGS AT HOME, OR GET ALONG WITH OTHER PEOPLE: SOMEWHAT DIFFICULT

## 2023-01-20 ASSESSMENT — PATIENT HEALTH QUESTIONNAIRE - PHQ9
6. FEELING BAD ABOUT YOURSELF - OR THAT YOU ARE A FAILURE OR HAVE LET YOURSELF OR YOUR FAMILY DOWN: 0
SUM OF ALL RESPONSES TO PHQ QUESTIONS 1-9: 2
2. FEELING DOWN, DEPRESSED OR HOPELESS: 1
SUM OF ALL RESPONSES TO PHQ9 QUESTIONS 1 & 2: 1
3. TROUBLE FALLING OR STAYING ASLEEP: 0
5. POOR APPETITE OR OVEREATING: 0
9. THOUGHTS THAT YOU WOULD BE BETTER OFF DEAD, OR OF HURTING YOURSELF: 0
7. TROUBLE CONCENTRATING ON THINGS, SUCH AS READING THE NEWSPAPER OR WATCHING TELEVISION: 0
1. LITTLE INTEREST OR PLEASURE IN DOING THINGS: 0
SUM OF ALL RESPONSES TO PHQ QUESTIONS 1-9: 2
4. FEELING TIRED OR HAVING LITTLE ENERGY: 0
SUM OF ALL RESPONSES TO PHQ QUESTIONS 1-9: 2
8. MOVING OR SPEAKING SO SLOWLY THAT OTHER PEOPLE COULD HAVE NOTICED. OR THE OPPOSITE, BEING SO FIGETY OR RESTLESS THAT YOU HAVE BEEN MOVING AROUND A LOT MORE THAN USUAL: 1
SUM OF ALL RESPONSES TO PHQ QUESTIONS 1-9: 2
10. IF YOU CHECKED OFF ANY PROBLEMS, HOW DIFFICULT HAVE THESE PROBLEMS MADE IT FOR YOU TO DO YOUR WORK, TAKE CARE OF THINGS AT HOME, OR GET ALONG WITH OTHER PEOPLE: 0

## 2023-01-20 NOTE — PROGRESS NOTES
[address] [address]  Dept: [phone]  Dept Fax: [phone]  Loc: [phone]    Visit Date: 1/20/2023    SUBJECTIVE DATA       CHIEF COMPLAINT:    Chief Complaint   Patient presents with    Follow-up    Medication Check    Medication Refill    Mental Health Problem     History obtained from: patient    HISTORY OF PRESENT ILLNESS:    Lorenzo Echols is a 47 y.o. female who presents to the office for follow up with medication management. HPI  Patient presents calm and cooperative. Patient states feeling \"good\" today. Patient stated medication is working, symptoms improving. Patient stated she feels more like herself, however some symptoms could be improved further. She endorses relationships at home are improving. Patient stated her anxiety peaks some days, hydroxyzine is helpful. She endorses she is sleeping better, needing to take hydroxyzine some nights but only taking once per day occasionally. She stated the Paxil initially made her fatigued, but that has improved. Discussed medication management with patient. Patient agreed with plan to continue medications at same doses for full effects, as symptoms improving. Patient rates depression 3-4/10 and anxiety 3-4/10 today on a scale of 0-10 with 10 being the worst.   Sleep- able to fall asleep, able to stay asleep, feeling rested. Patient stated in the past 3 days, she is sleeping better though since starting Paxil.    Interest- improving  Energy and motivation - improving  Concentration - improving  Memory -poor  Appetite- improving  Endorses some irritability, improving  Endorses some restlessness, improving  Denies hopelessness, helplessness, and worthlessness  Denies suicidal or homicidal ideation, plan, or intent  Denies auditory, visual, or other hallucinations    Patient previously reported having had hallucinations with postpartum depression after first child    New medical conditions or psychiatric admissions since seen by this provider last? Denies  Patient endorses currently taking the following psychiatric medications: Paxil 20mg, Hydroxyzine 25-50mg daily prn  (doesn't need to take it every day)  Past psychiatric medications include: Buspar for 1 year, hydroxyzine, zoloft, wellbutrin  Patient stated she has been taking oral CBD  Adverse reactions from psychotropic medications:  Denies  Patient Assets/Support system:   , neighbors, Jain   Endorsed coping skills: being in nature, take walks, being around animals  The patie nt endorses feeling safe at home Yes  Current Substance Use: Endorses using CBD oil every day for past 1 week    Motivational Interviewing and Cognitive Behavioral Therapy utilized, including behavioral modification, insight oriented, and supportive therapy. Patient is encouraged to utilize nonpharmacologic coping skills such as deep breathing, guided imagery, guided meditation, muscle relaxation, calming music, and/or journaling. Records review of communications , labs, and medications from an internal/external source completed.     ALLERGIES:    Gluten meal     PAST MEDICAL HISTORY:    Past Medical History:   Diagnosis Date    Celiac disease     Hypothyroidism        PREVIOUSMEDICATIONS:  Outpatient Medications Prior to Visit   Medication Sig Dispense Refill    hydrOXYzine HCl (ATARAX) 50 MG tablet TAKE 1 TABLET BY MOUTH 3 TIMES DAILY AS NEEDED FOR ANXIETY (AND INSOMNIA) 90 tablet 0    PARoxetine (PAXIL) 20 MG tablet Take 1 tablet by mouth every morning 30 tablet 2    hydrOXYzine HCl (ATARAX) 25 MG tablet Take 1 tablet by mouth 3 times daily as needed for Anxiety (and insomnia) 90 tablet 0    Cholecalciferol (VITAMIN D3) 50 MCG (2000 UT) CAPS Take 1 capsule by mouth daily Skip Sundays      clindamycin (CLEOCIN T) 1 % external solution Apply topically as needed  3    Lysine 500 MG TABS Take 500 mg by mouth 4 times daily      Ascorbic Acid (VITAMIN C) 1000 MG tablet Take 1,000 mg by mouth 4 times daily       TAZORAC 0.1 % cream Apply as needed nightly      NONFORMULARY Progesterone compounded capsule- 1 capsule daily on last half of cycle      Specialty Vitamins Products (MAGNESIUM, AMINO ACID CHELATE,) 133 MG tablet Take 1 tablet by mouth 4 times daily. ferrous sulfate 325 (65 FE) MG tablet Take 325 mg by mouth nightly. Omega-3 1400 MG CAPS Take 2 capsules by mouth 6 times daily [facility]  Or NOW1 before and as finish meals and at bedtime      Cinnamon 500 MG CAPS Take 1 capsule by mouth 4 times daily (before meals and nightly). Magnesium Citrate 100 MG TABS Take 3 tablets by mouth daily       THYROID PO Compound Thyroid 40 mcg/12 mcg Take 1 capsule by mouth daily      DHEA 10 MG TABS Take 20 mg by mouth daily Skip Mon Thur, if does not feel good, go to 10 mg per day and double Mon Wed Fri      QUERCETIN PO Take 1 capsule by mouth daily as needed (allergies). B Complex Vitamins (VITAMIN B COMPLEX) TABS Take 1 tablet by mouth 3 times daily (before meals) [employer], or natures made       No facility-administered medications prior to visit. REVIEW OF SYSTEMS:    Review of Systems   Constitutional:  Positive for fatigue. Psychiatric/Behavioral:  Positive for agitation, decreased concentration and dysphoric mood. The patient is nervous/anxious. All other systems reviewed and are negative. The patient sees Neha Olson DO as her primary care provider.     OBJECTIVE DATA     Wt Readings from Last 3 Encounters:   10/26/22 135 lb 6.4 oz (61.4 kg)   04/13/22 143 lb (64.9 kg)   06/29/21 135 lb (61.2 kg)        Mental Status Exam:   Level of consciousness:  within normal limits  Appearance:  in chair and good grooming   Behavior/Motor:  no abnormalities noted  Attitude toward examiner:  cooperative, attentive, and good eye contact  Speech:  spontaneous, normal rate, and normal volume  Mood:  \"good\" per patient  Affect:  mood congruent  Thought processes:  linear, goal directed, coherent, and circumstantial  Thought content:  Denies homicidal ideation  Suicidal Ideation:  denies suicidal ideation  Delusions:  no evidence of delusions  Perceptual Disturbance:  denies any perceptual disturbance  Cognition: Patient is oriented to person, place, time and situation  Concentration: poor, however improving  Memory: fair  Insight & Judgement: limited   Medication Side Effects: Absent  Attention Span: Attention span and concentration were age appropriate    Screenings Completed in This Encounter:     Anxiety and Depression:      DONAVON-7 SCREENING 1/20/2023 12/30/2022 12/12/2022   Feeling nervous, anxious, or on edge Several days Several days Nearly every day   Not being able to stop or control worrying Not at all Several days More than half the days   Worrying too much about different things Not at all Several days More than half the days   Trouble relaxing Not at all Several days More than half the days   Being so restless that it is hard to sit still Several days Several days More than half the days   Becoming easily annoyed or irritable Several days Several days More than half the days   Feeling afraid as if something awful might happen Several days Several days Several days   DONAVON-7 Total Score 4 7 14   How difficult have these problems made it for you to do your work, take care of things at home, or get along with other people? Somewhat difficult Somewhat difficult Very difficult           PHQ-2 Over the past 2 weeks, how often have you been bothered by any of the following problems? Little interest or pleasure in doing things: Not at all  Feeling down, depressed, or hopeless: Several days  PHQ-2 Score: 1  PHQ-9 Over the past 2 weeks, how often have you been bothered by any of the following problems?   Trouble falling or staying asleep, or sleeping too much: Not at all  Feeling tired or having little energy: Not at all  Poor appetite or overeating: Not at all  Feeling bad about yourself - or that you are a failure or have let yourself or your family down: Not at all  Trouble concentrating on things, such as reading the newspaper or watching television: Not at all  Moving or speaking so slowly that other people could have noticed. Or the opposite - being so fidgety or restless that you have been moving around a lot more than usual: Several days  Thoughts that you would be better off dead, or of hurting yourself in some way: Not at all  If you checked off any problems, how difficult have these problems made it for you to do your work, take care of things at home, or get along with other people?: Not difficult at all  PHQ-9 Total Score: 2  PHQ-9 Total Score: 2       DIAGNOSIS AND ASSESSMENT DATA     DSM-5 DIAGNOSIS:   1. MDD (major depressive disorder), recurrent episode, moderate ([facility] [address]    2. DONAVON (generalized anxiety disorder)    3. Insomnia, persistent      Rule Out Adjustment Disorder    Psychosocial and Contextual Factors[de-identified]  Financial  Occupational  Relationships  Legal  Living situation  Educational    PLAN   Follow-up:  Return in about 4 weeks (around 2/17/2023) for anxiety, depression, medication management.   Increase Paxil to 25mg daily for depression and anxiety  Continue Hydroxyzine 25-50mg prn up to three times per day for anxiety and insomnia  Consider psychotherapy  Exercise 30 minutes 3-4 times per week  Increase fluids, drink at least 8 glasses of water daily, no caffeine after 3pm  Sleep hygiene  Healthy diet    Prescriptions for this encounter:  New Prescriptions    PAROXETINE (PAXIL) 10 MG TABLET    Take 0.5 tablets by mouth daily Take 20mg with 5mg for a total of 25mg daily       Orders Placed This Encounter   Medications    hydrOXYzine HCl (ATARAX) 25 MG tablet     Sig: Take 1-2 tablets by mouth daily as needed for Anxiety (and insomnia)     Dispense:  60 tablet     Refill:  1    PARoxetine (PAXIL) 20 MG tablet Sig: Take 1 tablet by mouth every morning Take 20mg with 5mg for a total of 25mg daily     Dispense:  90 tablet     Refill:  1    PARoxetine (PAXIL) 10 MG tablet     Sig: Take 0.5 tablets by mouth daily Take 20mg with 5mg for a total of 25mg daily     Dispense:  45 tablet     Refill:  1           Medications Discontinued During This Encounter   Medication Reason    hydrOXYzine HCl (ATARAX) 25 MG tablet LIST CLEANUP    PARoxetine (PAXIL) 20 MG tablet     hydrOXYzine HCl (ATARAX) 50 MG tablet          Additional orders:  No orders of the defined types were placed in this encounter. Antidepressant Medications: We discussed the risks/benefits and side effects of medications. I stressed in particular side effects including but not limited to gastrointestinal problems, sexual dysfunction, serotonin syndrome, agitation, rare induction of frederick, rare activation of suicidality. We discussed the effects alcohol and illicit substances have on mood, thought process, physical health and interactions with medications. Discussed the side effects of nicotine and caffeine in sleep disturbance and anxiety. The client and I reviewed several treatment options to address his/her symptoms. I explained the risks/benefits of treatment with and without medication. The patient participated in and indicated understanding of the content of our discussion by agreeing to the mutually developed treatment plan. Risks, potential side effects, possible drug-drug interactions, benefits and alternate treatments discussed in detail. All questions answered. Patient stated understanding and is agreeable to treatment plan. Patient has been instructed to seek emergency help via the emergency and/or calling 911 should symptoms become severe, worsen, or with other concerning symptoms.  Patient instructed to go immediately to the emergency room and/or call 911 with any suicidal or homicidal ideations or if audio/visual hallucinations develop. Patient stated understanding and agrees. Patient given crisis center information. National Suicide Prevention Lifeline at #414  or 8-611-966-TALK (0-324.890.2559) or text HOME to 299115 to access the [address] [place] [address] St. I spent a total of 20 minutes with the patient and over half of that time was spent on counseling and coordination of care regarding topics discussed above. I spent 16 minutes with the patient for psychotherapy. The patient was engaged and responsive throughout session. Utilized CBT, MI and reflective listening to address topics above. The remainder of session spent on symptom evaluation and medication management. Provider Signature:  Electronically signed by JESSICA Barksdale CNP on 1/20/2023 at 12:59 PM    **This report has been created using voice recognition software. It may contain minor errors which are inherent in voice recognition technology. **     Mike Brock, was evaluated through a synchronous (real-time) audio-video encounter. The patient (or guardian if applicable) is aware that this is a billable service, which includes applicable co-pays. This Virtual Visit was conducted with patient's (and/or legal guardian's) consent. The visit was conducted pursuant to the emergency declaration under the [address] [place] [address], [address] [place] [address] authority and the [employer] and [employer] General Act. Patient identification was verified, and a caregiver was present when appropriate. The patient was located at Home: [address]. Provider was located at Home ([place] [address]): [place]. Total time spent for this encounter: Not billed by time    --JESSICA Barksdale CNP on 1/20/2023 at 12:59 PM    An electronic signature was used to authenticate this note.

## 2023-01-28 DIAGNOSIS — F41.1 GAD (GENERALIZED ANXIETY DISORDER): ICD-10-CM

## 2023-01-28 DIAGNOSIS — G47.00 INSOMNIA, PERSISTENT: ICD-10-CM

## 2023-01-28 DIAGNOSIS — F33.1 MDD (MAJOR DEPRESSIVE DISORDER), RECURRENT EPISODE, MODERATE (HCC): ICD-10-CM

## 2023-01-30 RX ORDER — HYDROXYZINE 50 MG/1
50 TABLET, FILM COATED ORAL 3 TIMES DAILY PRN
Qty: 90 TABLET | Refills: 0 | OUTPATIENT
Start: 2023-01-30

## 2023-02-17 ENCOUNTER — TELEMEDICINE (OUTPATIENT)
Dept: PSYCHIATRY | Age: 53
End: 2023-02-17

## 2023-02-17 DIAGNOSIS — F33.1 MDD (MAJOR DEPRESSIVE DISORDER), RECURRENT EPISODE, MODERATE (HCC): Primary | ICD-10-CM

## 2023-02-17 DIAGNOSIS — G47.00 INSOMNIA, PERSISTENT: ICD-10-CM

## 2023-02-17 DIAGNOSIS — F41.1 GAD (GENERALIZED ANXIETY DISORDER): ICD-10-CM

## 2023-02-17 RX ORDER — HYDROXYZINE HYDROCHLORIDE 25 MG/1
25 TABLET, FILM COATED ORAL DAILY PRN
Qty: 30 TABLET | Refills: 2 | Status: SHIPPED | OUTPATIENT
Start: 2023-02-17

## 2023-02-17 ASSESSMENT — PATIENT HEALTH QUESTIONNAIRE - PHQ9
9. THOUGHTS THAT YOU WOULD BE BETTER OFF DEAD, OR OF HURTING YOURSELF: 0
SUM OF ALL RESPONSES TO PHQ QUESTIONS 1-9: 1
4. FEELING TIRED OR HAVING LITTLE ENERGY: 0
SUM OF ALL RESPONSES TO PHQ QUESTIONS 1-9: 1
SUM OF ALL RESPONSES TO PHQ QUESTIONS 1-9: 1
3. TROUBLE FALLING OR STAYING ASLEEP: 0
6. FEELING BAD ABOUT YOURSELF - OR THAT YOU ARE A FAILURE OR HAVE LET YOURSELF OR YOUR FAMILY DOWN: 0
5. POOR APPETITE OR OVEREATING: 0
10. IF YOU CHECKED OFF ANY PROBLEMS, HOW DIFFICULT HAVE THESE PROBLEMS MADE IT FOR YOU TO DO YOUR WORK, TAKE CARE OF THINGS AT HOME, OR GET ALONG WITH OTHER PEOPLE: 0
8. MOVING OR SPEAKING SO SLOWLY THAT OTHER PEOPLE COULD HAVE NOTICED. OR THE OPPOSITE, BEING SO FIGETY OR RESTLESS THAT YOU HAVE BEEN MOVING AROUND A LOT MORE THAN USUAL: 0
SUM OF ALL RESPONSES TO PHQ QUESTIONS 1-9: 1
1. LITTLE INTEREST OR PLEASURE IN DOING THINGS: 0
7. TROUBLE CONCENTRATING ON THINGS, SUCH AS READING THE NEWSPAPER OR WATCHING TELEVISION: 0
SUM OF ALL RESPONSES TO PHQ9 QUESTIONS 1 & 2: 1
2. FEELING DOWN, DEPRESSED OR HOPELESS: 1

## 2023-02-17 ASSESSMENT — ANXIETY QUESTIONNAIRES
GAD7 TOTAL SCORE: 3
6. BECOMING EASILY ANNOYED OR IRRITABLE: 1
1. FEELING NERVOUS, ANXIOUS, OR ON EDGE: 1
3. WORRYING TOO MUCH ABOUT DIFFERENT THINGS: 0
4. TROUBLE RELAXING: 0
7. FEELING AFRAID AS IF SOMETHING AWFUL MIGHT HAPPEN: 1
IF YOU CHECKED OFF ANY PROBLEMS ON THIS QUESTIONNAIRE, HOW DIFFICULT HAVE THESE PROBLEMS MADE IT FOR YOU TO DO YOUR WORK, TAKE CARE OF THINGS AT HOME, OR GET ALONG WITH OTHER PEOPLE: SOMEWHAT DIFFICULT
2. NOT BEING ABLE TO STOP OR CONTROL WORRYING: 0
5. BEING SO RESTLESS THAT IT IS HARD TO SIT STILL: 0

## 2023-02-17 NOTE — PROGRESS NOTES
[address] [address]  Dept: [phone]  Dept Fax: [phone]  Loc: [phone]    Visit Date: 2/17/2023    SUBJECTIVE DATA       CHIEF COMPLAINT:    Chief Complaint   Patient presents with    Follow-up    Medication Check    Medication Refill    Mental Health Problem       History obtained from: patient    HISTORY OF PRESENT ILLNESS:    Randi Lucero is a 47 y.o. female who presents to the office for follow up with medication management. HPI  Patient presents calm and cooperative. Patient states feeling \"good\" today. Patient stated medications are working, symptoms improving. She stated increasing the dose of the Paxil has helped, feels \"more cheerful\", and her [address] of humor is coming back\". Patient stated she allows herself to rest when needed. Patient stated her son's CT scan for cancer came back good. She endorsed a lot of worry and anxiety at that time, however was able to use coping skills and the medications helped relieve the anxiety, to keep it minimal. Patient stated she has only needed hydroxyzine 25mg a few times for anxiety and insomnia. Patient stated \"things have leveled out and I feel like myself again\", stated \"it's been a long time\" and didn't realize how depressed she was since her son was diagnosed with cancer 2 years ago. Discussed medication management with patient. Patient agreed with plan to continue medications at same doses as symptoms improving. Patient rates depression 2/10 and anxiety 2/10 today on a scale of 0-10 with 10 being the worst.   Sleep- able to fall asleep, able to stay asleep, feeling rested.    Interest- improving  Energy and motivation - improving  Concentration - improving  Memory -poor  Appetite- improving  Endorses some irritability, improving  Endorses some restlessness, improving  Denies hopelessness, helplessness, and worthlessness  Denies suicidal or homicidal ideation, plan, or intent  Denies auditory, visual, or other hallucinations    NOTE: Patient previously reported having had hallucinations with postpartum depression after first child    New medical conditions or psychiatric admissions since seen by this provider last? Denies  Patient endorses currently taking the following psychiatric medications: Paxil 25mg, Hydroxyzine 25-50mg daily prn (taking for sleep)  (doesn't need to take it every day)  Past psychiatric medications include: Buspar for 1 year, hydroxyzine, zoloft, wellbutrin  Patient stated she has been taking oral CBD  Adverse reactions from psychotropic medications:  Denies  Patient Assets/Support system:   , neighbors, Religious   Endorsed coping skills: being in nature, take walks, being around animals  The patie nt endorses feeling safe at home Yes  Current Substance Use: Endorses using CBD oil every day for past 1 week    Motivational Interviewing and Cognitive Behavioral Therapy utilized, including behavioral modification, insight oriented, and supportive therapy. Patient is encouraged to utilize nonpharmacologic coping skills such as deep breathing, guided imagery, guided meditation, muscle relaxation, calming music, and/or journaling. Records review of communications , labs, and medications from an internal/external source completed.     ALLERGIES:    Gluten meal     PAST MEDICAL HISTORY:    Past Medical History:   Diagnosis Date    Celiac disease     Hypothyroidism        PREVIOUSMEDICATIONS:  Outpatient Medications Prior to Visit   Medication Sig Dispense Refill    PARoxetine (PAXIL) 20 MG tablet Take 1 tablet by mouth every morning Take 20mg with 5mg for a total of 25mg daily 90 tablet 1    PARoxetine (PAXIL) 10 MG tablet Take 0.5 tablets by mouth daily Take 20mg with 5mg for a total of 25mg daily 45 tablet 1    hydrOXYzine HCl (ATARAX) 25 MG tablet Take 1-2 tablets by mouth daily as needed for Anxiety (and insomnia) 60 tablet 1    Cholecalciferol (VITAMIN D3) 50 MCG (2000 UT) CAPS Take 1 capsule by mouth daily Skip Sundays      clindamycin (CLEOCIN T) 1 % external solution Apply topically as needed  3    Lysine 500 MG TABS Take 500 mg by mouth 4 times daily      Ascorbic Acid (VITAMIN C) 1000 MG tablet Take 1,000 mg by mouth 4 times daily       TAZORAC 0.1 % cream Apply as needed nightly      NONFORMULARY Progesterone compounded capsule- 1 capsule daily on last half of cycle      Specialty Vitamins Products (MAGNESIUM, AMINO ACID CHELATE,) 133 MG tablet Take 1 tablet by mouth 4 times daily. ferrous sulfate 325 (65 FE) MG tablet Take 325 mg by mouth nightly. Omega-3 1400 MG CAPS Take 2 capsules by mouth 6 times daily [facility]  Or NOW1 before and as finish meals and at bedtime      Cinnamon 500 MG CAPS Take 1 capsule by mouth 4 times daily (before meals and nightly). Magnesium Citrate 100 MG TABS Take 3 tablets by mouth daily       THYROID PO Compound Thyroid 40 mcg/12 mcg Take 1 capsule by mouth daily      DHEA 10 MG TABS Take 20 mg by mouth daily Skip Mon Thur, if does not feel good, go to 10 mg per day and double Mon Wed Fri      QUERCETIN PO Take 1 capsule by mouth daily as needed (allergies). B Complex Vitamins (VITAMIN B COMPLEX) TABS Take 1 tablet by mouth 3 times daily (before meals) [employer], or natures made       No facility-administered medications prior to visit. REVIEW OF SYSTEMS:    Review of Systems   Psychiatric/Behavioral:  Positive for agitation and dysphoric mood. The patient is nervous/anxious. All other systems reviewed and are negative. The patient sees Shellie Jenkins DO as her primary care provider.     OBJECTIVE DATA     Wt Readings from Last 3 Encounters:   10/26/22 135 lb 6.4 oz (61.4 kg)   04/13/22 143 lb (64.9 kg)   06/29/21 135 lb (61.2 kg)        Mental Status Exam:   Level of consciousness:  within normal limits  Appearance:  in chair and good grooming   Behavior/Motor: no abnormalities noted  Attitude toward examiner:  cooperative, attentive, and good eye contact  Speech:  spontaneous, normal rate, and normal volume  Mood:  \"good\" per patient  Affect:  mood congruent  Thought processes:  linear, goal directed, coherent, and circumstantial  Thought content:  Denies homicidal ideation  Suicidal Ideation:  denies suicidal ideation  Delusions:  no evidence of delusions  Perceptual Disturbance:  denies any perceptual disturbance  Cognition: Patient is oriented to person, place, time and situation  Concentration: improving  Memory: fair  Insight & Judgement: limited   Medication Side Effects: Absent  Attention Span: Attention span and concentration were age appropriate    Screenings Completed in This Encounter:     Anxiety and Depression:      DONAVON-7 SCREENING 2/17/2023 1/20/2023 12/30/2022   Feeling nervous, anxious, or on edge Several days Several days Several days   Not being able to stop or control worrying Not at all Not at all Several days   Worrying too much about different things Not at all Not at all Several days   Trouble relaxing Not at all Not at all Several days   Being so restless that it is hard to sit still Not at all Several days Several days   Becoming easily annoyed or irritable Several days Several days Several days   Feeling afraid as if something awful might happen Several days Several days Several days   DONAVON-7 Total Score 3 4 7   How difficult have these problems made it for you to do your work, take care of things at home, or get along with other people? Somewhat difficult Somewhat difficult Somewhat difficult           PHQ-2 Over the past 2 weeks, how often have you been bothered by any of the following problems? Little interest or pleasure in doing things: Not at all  Feeling down, depressed, or hopeless: Several days  PHQ-2 Score: 1  PHQ-9 Over the past 2 weeks, how often have you been bothered by any of the following problems?   Trouble falling or staying asleep, or sleeping too much: Not at all  Feeling tired or having little energy: Not at all  Poor appetite or overeating: Not at all  Feeling bad about yourself - or that you are a failure or have let yourself or your family down: Not at all  Trouble concentrating on things, such as reading the newspaper or watching television: Not at all  Moving or speaking so slowly that other people could have noticed. Or the opposite - being so fidgety or restless that you have been moving around a lot more than usual: Not at all  Thoughts that you would be better off dead, or of hurting yourself in some way: Not at all  If you checked off any problems, how difficult have these problems made it for you to do your work, take care of things at home, or get along with other people?: Not difficult at all  PHQ-9 Total Score: 1  PHQ-9 Total Score: 1       DIAGNOSIS AND ASSESSMENT DATA     DSM-5 DIAGNOSIS:   1. MDD (major depressive disorder), recurrent episode, moderate ([facility] [address]    2. DONAVON (generalized anxiety disorder)    3. Insomnia, persistent      Rule Out Adjustment Disorder    Psychosocial and Contextual Factors[de-identified]  Financial  Occupational  Relationships  Legal  Living situation  Educational    PLAN   Follow-up:  Return in about 6 weeks (around 3/31/2023) for anxiety, depression, medication management.   Continue Paxil 25mg daily for depression and anxiety  Continue Hydroxyzine 25mg prn daily for anxiety and insomnia  Consider psychotherapy  Exercise 30 minutes 3-4 times per week  Increase fluids, drink at least 8 glasses of water daily, no caffeine after 3pm  Sleep hygiene  Healthy diet    Prescriptions for this encounter:  New Prescriptions    No medications on file       Orders Placed This Encounter   Medications    hydrOXYzine HCl (ATARAX) 25 MG tablet     Sig: Take 1 tablet by mouth daily as needed for Anxiety (and insomnia)     Dispense:  30 tablet     Refill:  2     Medications Discontinued During This Encounter   Medication Reason    hydrOXYzine HCl (ATARAX) 25 MG tablet      Additional orders:  No orders of the defined types were placed in this encounter. Antidepressant Medications: We discussed the risks/benefits and side effects of medications. I stressed in particular side effects including but not limited to gastrointestinal problems, sexual dysfunction, serotonin syndrome, agitation, rare induction of frederick, rare activation of suicidality. We discussed the effects alcohol and illicit substances have on mood, thought process, physical health and interactions with medications. Discussed the side effects of nicotine and caffeine in sleep disturbance and anxiety. The client and I reviewed several treatment options to address his/her symptoms. I explained the risks/benefits of treatment with and without medication. The patient participated in and indicated understanding of the content of our discussion by agreeing to the mutually developed treatment plan. Risks, potential side effects, possible drug-drug interactions, benefits and alternate treatments discussed in detail. All questions answered. Patient stated understanding and is agreeable to treatment plan. Patient has been instructed to seek emergency help via the emergency and/or calling 911 should symptoms become severe, worsen, or with other concerning symptoms. Patient instructed to go immediately to the emergency room and/or call 911 with any suicidal or homicidal ideations or if audio/visual hallucinations develop. Patient stated understanding and agrees. Patient given crisis center information. National Suicide Prevention Lifeline at #085  or 1-800-273-talk (1-504.234.4243) or text HOME to 201435 to access the [facility]. I spent a total of 20 minutes with the patient and over half of that time was spent on counseling and coordination of care regarding topics discussed above. I spent 16 minutes with the patient for psychotherapy.  The patient was engaged and responsive throughout session. Utilized CBT, MI and reflective listening to address topics above. The remainder of session spent on symptom evaluation and medication management. Provider Signature:  Electronically signed by JESSICA Caba CNP on 2/17/2023 at 10:13 AM    **This report has been created using voice recognition software. It may contain minor errors which are inherent in voice recognition technology. **     Carmie Leaks, was evaluated through a synchronous (real-time) audio-video encounter. The patient (or guardian if applicable) is aware that this is a billable service, which includes applicable co-pays. This Virtual Visit was conducted with patient's (and/or legal guardian's) consent. The visit was conducted pursuant to the emergency declaration under the [address] [place] [address], [address] [place] [address] waiver authority and the [employer] and [employer] General Act. Patient identification was verified, and a caregiver was present when appropriate. The patient was located at Home: [address]. Provider was located at Home ([address] [address]): [facility]. Total time spent for this encounter: Not billed by time    --JESSICA Caba CNP on 2/17/2023 at 10:13 AM    An electronic signature was used to authenticate this note.

## 2023-02-24 ENCOUNTER — OFFICE VISIT (OUTPATIENT)
Dept: PSYCHOLOGY | Age: 53
End: 2023-02-24
Payer: COMMERCIAL

## 2023-02-24 DIAGNOSIS — F43.23 ADJUSTMENT DISORDER WITH MIXED ANXIETY AND DEPRESSED MOOD: Primary | ICD-10-CM

## 2023-02-24 PROCEDURE — 90837 PSYTX W PT 60 MINUTES: CPT | Performed by: PSYCHOLOGIST

## 2023-02-24 NOTE — PROGRESS NOTES
Psychotherapy Note  Jennifer Bedolla Psy.D. Visit Date:  2/24/2023    Patient:  Ruthy Tong  YOB: 1970  Chief Complaint:  Follow-up, Anxiety, and Stress      Duration of session:  55 minutes      S:     Ct said she's continued to feel pretty well. Ct said her Paxil was slightly increased last week, which has helped even more. She feels more cheerful, her sense of humor is coming back, she's not crying all the time, etc.  She said things with [address] have leveled off, stabilized, but now she's worried what bad thing will happen next. She's continued to pray a lot, and doing things that are good for her, like riding horses. Her nikhil continues to be strong for her, and she's been praying even more, doing adoration, and feeling God's love, mercy, and александр in her heart. O:    Appearance    Patient presents as alert, oriented, and cooperative, tearful with mild distress  Appetite normal  Sleep disturbance Some  Loss of pleasure Some  Speech    clear and understandable  Mood    Dysthymic  Affect    anxiety  Thought Process    linear and coherent  Insight    Good  Judgment    Intact  Memory    recent and remote memory intact  Suicide Assessment    no suicidal ideation      A:    1. Adjustment disorder with mixed anxiety and depressed mood        Ct needing supportive psychotherapy to address stress, anxiety, coping, through the use of mindfulness and relying on the strength of her nikhil. P:    Meet again in 4-6 weeks. All questions about treatment plan answered. Patient instructed to go immediately to the emergency room and/or call 911 if any suicidal or homicidal ideations. Patient stated understanding and is agreeable to treatment and crisis plan.         Provider Signature:  Electronically signed by Zainab Rivera PSYD on 2/24/2023 at 10:00 AM

## 2023-03-16 DIAGNOSIS — F41.1 GAD (GENERALIZED ANXIETY DISORDER): ICD-10-CM

## 2023-03-16 DIAGNOSIS — F33.1 MDD (MAJOR DEPRESSIVE DISORDER), RECURRENT EPISODE, MODERATE (HCC): ICD-10-CM

## 2023-03-16 DIAGNOSIS — G47.00 INSOMNIA, PERSISTENT: ICD-10-CM

## 2023-03-16 RX ORDER — HYDROXYZINE HYDROCHLORIDE 25 MG/1
25 TABLET, FILM COATED ORAL DAILY PRN
Qty: 30 TABLET | Refills: 2 | OUTPATIENT
Start: 2023-03-16

## 2023-03-29 NOTE — PROGRESS NOTES
the days  PHQ-2 Score: 2  PHQ-9 Over the past 2 weeks, how often have you been bothered by any of the following problems? Trouble falling or staying asleep, or sleeping too much: Not at all  Feeling tired or having little energy: Not at all  Poor appetite or overeating: Not at all  Feeling bad about yourself - or that you are a failure or have let yourself or your family down: Not at all  Trouble concentrating on things, such as reading the newspaper or watching television: Several days  Moving or speaking so slowly that other people could have noticed. Or the opposite - being so fidgety or restless that you have been moving around a lot more than usual: Not at all  Thoughts that you would be better off dead, or of hurting yourself in some way: Not at all  If you checked off any problems, how difficult have these problems made it for you to do your work, take care of things at home, or get along with other people?: Somewhat difficult  PHQ-9 Total Score: 3  PHQ-9 Total Score: 3       DIAGNOSIS AND ASSESSMENT DATA     DSM-5 DIAGNOSIS:   1. MDD (major depressive disorder), recurrent episode, moderate ([facility] [address]    2. DONAVON (generalized anxiety disorder)    3. Insomnia, persistent      Rule Out Adjustment Disorder    Psychosocial and Contextual Factors[de-identified]  Financial  Occupational  Relationships  Legal  Living situation  Educational    PLAN   Follow-up:  Return in about 4 weeks (around 4/28/2023) for anxiety, depression, medication management.   Increase Paxil to 30mg daily for depression and anxiety  Continue Hydroxyzine 25mg prn daily for anxiety and insomnia  Consider psychotherapy  Exercise 30 minutes 3-4 times per week  Increase fluids, drink at least 8 glasses of water daily, no caffeine after 3pm  Sleep hygiene  Healthy diet    Prescriptions for this encounter:  New Prescriptions    No medications on file       Orders Placed This Encounter   Medications    hydrOXYzine HCl (ATARAX) 25 MG tablet     Sig: Take 1 tablet by

## 2023-03-31 ENCOUNTER — TELEMEDICINE (OUTPATIENT)
Dept: PSYCHIATRY | Age: 53
End: 2023-03-31

## 2023-03-31 DIAGNOSIS — F41.1 GAD (GENERALIZED ANXIETY DISORDER): ICD-10-CM

## 2023-03-31 DIAGNOSIS — G47.00 INSOMNIA, PERSISTENT: ICD-10-CM

## 2023-03-31 DIAGNOSIS — F33.1 MDD (MAJOR DEPRESSIVE DISORDER), RECURRENT EPISODE, MODERATE (HCC): Primary | ICD-10-CM

## 2023-03-31 RX ORDER — HYDROXYZINE HYDROCHLORIDE 25 MG/1
25 TABLET, FILM COATED ORAL 3 TIMES DAILY PRN
Qty: 90 TABLET | Refills: 2 | Status: SHIPPED | OUTPATIENT
Start: 2023-03-31

## 2023-03-31 RX ORDER — PAROXETINE HYDROCHLORIDE 20 MG/1
30 TABLET, FILM COATED ORAL EVERY MORNING
Qty: 90 TABLET | Refills: 2 | Status: SHIPPED | OUTPATIENT
Start: 2023-03-31 | End: 2023-09-27

## 2023-03-31 ASSESSMENT — ANXIETY QUESTIONNAIRES
3. WORRYING TOO MUCH ABOUT DIFFERENT THINGS: 0
GAD7 TOTAL SCORE: 4
2. NOT BEING ABLE TO STOP OR CONTROL WORRYING: 0
5. BEING SO RESTLESS THAT IT IS HARD TO SIT STILL: 0
4. TROUBLE RELAXING: 0
1. FEELING NERVOUS, ANXIOUS, OR ON EDGE: 2
7. FEELING AFRAID AS IF SOMETHING AWFUL MIGHT HAPPEN: 1
6. BECOMING EASILY ANNOYED OR IRRITABLE: 1
IF YOU CHECKED OFF ANY PROBLEMS ON THIS QUESTIONNAIRE, HOW DIFFICULT HAVE THESE PROBLEMS MADE IT FOR YOU TO DO YOUR WORK, TAKE CARE OF THINGS AT HOME, OR GET ALONG WITH OTHER PEOPLE: SOMEWHAT DIFFICULT

## 2023-03-31 ASSESSMENT — PATIENT HEALTH QUESTIONNAIRE - PHQ9
SUM OF ALL RESPONSES TO PHQ9 QUESTIONS 1 & 2: 2
7. TROUBLE CONCENTRATING ON THINGS, SUCH AS READING THE NEWSPAPER OR WATCHING TELEVISION: 1
SUM OF ALL RESPONSES TO PHQ QUESTIONS 1-9: 3
SUM OF ALL RESPONSES TO PHQ QUESTIONS 1-9: 3
9. THOUGHTS THAT YOU WOULD BE BETTER OFF DEAD, OR OF HURTING YOURSELF: 0
4. FEELING TIRED OR HAVING LITTLE ENERGY: 0
8. MOVING OR SPEAKING SO SLOWLY THAT OTHER PEOPLE COULD HAVE NOTICED. OR THE OPPOSITE, BEING SO FIGETY OR RESTLESS THAT YOU HAVE BEEN MOVING AROUND A LOT MORE THAN USUAL: 0
SUM OF ALL RESPONSES TO PHQ QUESTIONS 1-9: 3
2. FEELING DOWN, DEPRESSED OR HOPELESS: 2
SUM OF ALL RESPONSES TO PHQ QUESTIONS 1-9: 3
6. FEELING BAD ABOUT YOURSELF - OR THAT YOU ARE A FAILURE OR HAVE LET YOURSELF OR YOUR FAMILY DOWN: 0
1. LITTLE INTEREST OR PLEASURE IN DOING THINGS: 0
5. POOR APPETITE OR OVEREATING: 0
3. TROUBLE FALLING OR STAYING ASLEEP: 0
10. IF YOU CHECKED OFF ANY PROBLEMS, HOW DIFFICULT HAVE THESE PROBLEMS MADE IT FOR YOU TO DO YOUR WORK, TAKE CARE OF THINGS AT HOME, OR GET ALONG WITH OTHER PEOPLE: 1

## 2023-04-25 DIAGNOSIS — F41.1 GAD (GENERALIZED ANXIETY DISORDER): ICD-10-CM

## 2023-04-25 DIAGNOSIS — F33.1 MDD (MAJOR DEPRESSIVE DISORDER), RECURRENT EPISODE, MODERATE (HCC): ICD-10-CM

## 2023-04-25 DIAGNOSIS — G47.00 INSOMNIA, PERSISTENT: ICD-10-CM

## 2023-04-25 RX ORDER — PAROXETINE HYDROCHLORIDE 20 MG/1
TABLET, FILM COATED ORAL
Qty: 45 TABLET | Refills: 5 | OUTPATIENT
Start: 2023-04-25

## 2023-05-01 ENCOUNTER — TELEMEDICINE (OUTPATIENT)
Dept: PSYCHIATRY | Age: 53
End: 2023-05-01
Payer: COMMERCIAL

## 2023-05-01 DIAGNOSIS — G47.00 INSOMNIA, PERSISTENT: ICD-10-CM

## 2023-05-01 DIAGNOSIS — F41.1 GAD (GENERALIZED ANXIETY DISORDER): ICD-10-CM

## 2023-05-01 DIAGNOSIS — F33.1 MDD (MAJOR DEPRESSIVE DISORDER), RECURRENT EPISODE, MODERATE (HCC): Primary | ICD-10-CM

## 2023-05-01 PROCEDURE — 99214 OFFICE O/P EST MOD 30 MIN: CPT

## 2023-05-01 ASSESSMENT — PATIENT HEALTH QUESTIONNAIRE - PHQ9
8. MOVING OR SPEAKING SO SLOWLY THAT OTHER PEOPLE COULD HAVE NOTICED. OR THE OPPOSITE, BEING SO FIGETY OR RESTLESS THAT YOU HAVE BEEN MOVING AROUND A LOT MORE THAN USUAL: 0
10. IF YOU CHECKED OFF ANY PROBLEMS, HOW DIFFICULT HAVE THESE PROBLEMS MADE IT FOR YOU TO DO YOUR WORK, TAKE CARE OF THINGS AT HOME, OR GET ALONG WITH OTHER PEOPLE: 0
9. THOUGHTS THAT YOU WOULD BE BETTER OFF DEAD, OR OF HURTING YOURSELF: 0
6. FEELING BAD ABOUT YOURSELF - OR THAT YOU ARE A FAILURE OR HAVE LET YOURSELF OR YOUR FAMILY DOWN: 0
SUM OF ALL RESPONSES TO PHQ QUESTIONS 1-9: 1
7. TROUBLE CONCENTRATING ON THINGS, SUCH AS READING THE NEWSPAPER OR WATCHING TELEVISION: 0
3. TROUBLE FALLING OR STAYING ASLEEP: 0
SUM OF ALL RESPONSES TO PHQ QUESTIONS 1-9: 1
4. FEELING TIRED OR HAVING LITTLE ENERGY: 0
SUM OF ALL RESPONSES TO PHQ9 QUESTIONS 1 & 2: 1
SUM OF ALL RESPONSES TO PHQ QUESTIONS 1-9: 1
5. POOR APPETITE OR OVEREATING: 0
2. FEELING DOWN, DEPRESSED OR HOPELESS: 1
SUM OF ALL RESPONSES TO PHQ QUESTIONS 1-9: 1
1. LITTLE INTEREST OR PLEASURE IN DOING THINGS: 0

## 2023-05-01 ASSESSMENT — ANXIETY QUESTIONNAIRES
6. BECOMING EASILY ANNOYED OR IRRITABLE: 0
5. BEING SO RESTLESS THAT IT IS HARD TO SIT STILL: 0
4. TROUBLE RELAXING: 0
7. FEELING AFRAID AS IF SOMETHING AWFUL MIGHT HAPPEN: 0
3. WORRYING TOO MUCH ABOUT DIFFERENT THINGS: 0
GAD7 TOTAL SCORE: 1
IF YOU CHECKED OFF ANY PROBLEMS ON THIS QUESTIONNAIRE, HOW DIFFICULT HAVE THESE PROBLEMS MADE IT FOR YOU TO DO YOUR WORK, TAKE CARE OF THINGS AT HOME, OR GET ALONG WITH OTHER PEOPLE: SOMEWHAT DIFFICULT
1. FEELING NERVOUS, ANXIOUS, OR ON EDGE: 1
2. NOT BEING ABLE TO STOP OR CONTROL WORRYING: 0

## 2023-05-01 NOTE — PROGRESS NOTES
[address] [address]  Dept: [phone]  Dept Fax: [phone]  Loc: [phone]    Visit Date: 5/1/2023    SUBJECTIVE DATA       CHIEF COMPLAINT:    Chief Complaint   Patient presents with    Follow-up    Medication Check    Medication Refill    Mental Health Problem         History obtained from: patient    HISTORY OF PRESENT ILLNESS:    Breann Louie is a 47 y.o. female who presents to the office for follow up with medication management. HPI  Patient presents calm and cooperative. Patient states feeling \"good\" today. Patient stated medications are working, symptoms improving with the Paxil dose increased. She denies situational stressors currently. Patient stated she doesn't need to take hydroxyzine very often for anxiety or sleep, symptoms manageable. Endorses medication compliance. Discussed medication management with patient. Patient agreed with plan to continue medications at the same doses. Patient rates depression 3/10 and anxiety 3/10 today on a scale of 0-10 with 10 being the worst.   Sleep- able to fall asleep, able to stay asleep, feeling rested. Interest- improving  Energy and motivation - improving  Concentration - improving  Memory -poor  Appetite- improving, gained 5-6 pounds in the past 1 month. Her appetite is back to normal. She stated she lost 10 pounds when son was sick.    Endorses less irritability, improving  Endorses less restlessness, improving  Denies hopelessness, helplessness, and worthlessness  Denies suicidal or homicidal ideation, plan, or intent  Denies auditory, visual, or other hallucinations  Denies paranoid delusions    NOTE: Patient previously reported having had hallucinations with postpartum depression after first child    New medical conditions or psychiatric admissions since seen by this provider last? Denies  Patient endorses currently taking the

## 2023-05-24 ENCOUNTER — NURSE ONLY (OUTPATIENT)
Dept: LAB | Age: 53
End: 2023-05-24

## 2023-05-24 DIAGNOSIS — Z00.00 GENERAL MEDICAL EXAM: ICD-10-CM

## 2023-05-24 DIAGNOSIS — Z71.89 ENCOUNTER FOR HERB AND VITAMIN SUPPLEMENT MANAGEMENT: ICD-10-CM

## 2023-05-24 DIAGNOSIS — K90.0 CELIAC DISEASE: ICD-10-CM

## 2023-05-24 DIAGNOSIS — R53.83 TIRED: ICD-10-CM

## 2023-05-24 DIAGNOSIS — G47.00 INSOMNIA, UNSPECIFIED TYPE: ICD-10-CM

## 2023-05-24 DIAGNOSIS — J45.20 MILD INTERMITTENT ASTHMA WITHOUT COMPLICATION: ICD-10-CM

## 2023-05-24 DIAGNOSIS — E03.8 OTHER SPECIFIED HYPOTHYROIDISM: ICD-10-CM

## 2023-05-24 DIAGNOSIS — F41.9 ANXIETY: ICD-10-CM

## 2023-05-24 DIAGNOSIS — F32.A DEPRESSIVE DISORDER: ICD-10-CM

## 2023-05-24 DIAGNOSIS — E78.9 LIPID DISORDER: ICD-10-CM

## 2023-05-24 DIAGNOSIS — K90.0 CELIAC DISEASE: Primary | ICD-10-CM

## 2023-05-24 LAB
25(OH)D3 SERPL-MCNC: 41 NG/ML (ref 30–100)
BASOPHILS ABSOLUTE: 0 THOU/MM3 (ref 0–0.1)
BASOPHILS NFR BLD AUTO: 0.8 %
CALCIUM SERPL-MCNC: 9.1 MG/DL (ref 8.5–10.5)
DEPRECATED RDW RBC AUTO: 49.4 FL (ref 35–45)
EOSINOPHIL NFR BLD AUTO: 1.2 %
EOSINOPHILS ABSOLUTE: 0.1 THOU/MM3 (ref 0–0.4)
ERYTHROCYTE [DISTWIDTH] IN BLOOD BY AUTOMATED COUNT: 15 % (ref 11.5–14.5)
ERYTHROCYTE [SEDIMENTATION RATE] IN BLOOD BY WESTERGREN METHOD: 2 MM/HR (ref 0–20)
HCT VFR BLD AUTO: 37.1 % (ref 37–47)
HGB BLD-MCNC: 11.5 GM/DL (ref 12–16)
IMM GRANULOCYTES # BLD AUTO: 0.02 THOU/MM3 (ref 0–0.07)
IMM GRANULOCYTES NFR BLD AUTO: 0.4 %
IRON SERPL-MCNC: 57 UG/DL (ref 50–170)
LYMPHOCYTES ABSOLUTE: 1.6 THOU/MM3 (ref 1–4.8)
LYMPHOCYTES NFR BLD AUTO: 30 %
MAGNESIUM SERPL-MCNC: 2.3 MG/DL (ref 1.6–2.4)
MCH RBC QN AUTO: 27.6 PG (ref 26–33)
MCHC RBC AUTO-ENTMCNC: 31 GM/DL (ref 32.2–35.5)
MCV RBC AUTO: 89.2 FL (ref 81–99)
MONOCYTES ABSOLUTE: 0.4 THOU/MM3 (ref 0.4–1.3)
MONOCYTES NFR BLD AUTO: 7.5 %
NEUTROPHILS NFR BLD AUTO: 60.1 %
NRBC BLD AUTO-RTO: 0 /100 WBC
PLATELET # BLD AUTO: 270 THOU/MM3 (ref 130–400)
PMV BLD AUTO: 10.1 FL (ref 9.4–12.4)
PROGEST SERPL-MCNC: < 0.05 NG/ML
PTH-INTACT SERPL-MCNC: 36.5 PG/ML (ref 15–65)
RBC # BLD AUTO: 4.16 MILL/MM3 (ref 4.2–5.4)
RHEUMATOID FACT SERPL-ACNC: < 10 IU/ML (ref 0–13)
SEGMENTED NEUTROPHILS ABSOLUTE COUNT: 3.1 THOU/MM3 (ref 1.8–7.7)
T3FREE SERPL-MCNC: 2.5 PG/ML (ref 2.02–4.43)
T4 FREE SERPL-MCNC: 1.13 NG/DL (ref 0.93–1.76)
TIBC SERPL-MCNC: 329 UG/DL (ref 171–450)
TSH SERPL DL<=0.005 MIU/L-ACNC: 2.11 UIU/ML (ref 0.4–4.2)
WBC # BLD AUTO: 5.2 THOU/MM3 (ref 4.8–10.8)

## 2023-05-25 LAB
C-REACTIVE PROTEIN, HIGH SENSITIVITY: 2.2 MG/L
DHEA-S SERPL-MCNC: 261 UG/DL (ref 26–200)
ESTRADIOL LEVEL: 20.1 PG/ML (ref 27–314)
FOLLICLE STIMULATING HORMONE: 30.7 MIU/ML (ref 1.7–21.5)
IGE SERPL-ACNC: 2 IU/ML
LUTEINIZING HORMONE: 20.3 MIU/ML (ref 1–95.6)
T3 TOTAL: 105 NG/DL (ref 60–181)
T4 SERPL-MCNC: 6 UG/DL (ref 4.5–10.9)
THYROPEROXIDASE AB SERPL IA-ACNC: < 4 IU/ML (ref 0–25)

## 2023-05-26 LAB
GLIADIN IGG SER IA-ACNC: 0.7 U/ML
NUCLEAR IGG SER QL IA: NORMAL
TTG IGA SER IA-ACNC: < 0.1 U/ML

## 2023-05-27 LAB
PROTEIN ELECTROPHORESIS, SERUM: NORMAL
TTG IGG SER IA-ACNC: < 0.6 U/ML

## 2023-05-28 LAB — DHEA SERPL-MCNC: 3.83 NG/ML (ref 0.63–4.7)

## 2023-05-31 LAB — TESTOSTERONE, FREE W SHGB, FEMALES/CHILDREN: NORMAL

## 2023-06-12 PROBLEM — N92.6 IRREGULAR PERIODS: Status: ACTIVE | Noted: 2022-11-18

## 2023-06-28 DIAGNOSIS — F41.1 GAD (GENERALIZED ANXIETY DISORDER): ICD-10-CM

## 2023-06-28 DIAGNOSIS — F33.1 MDD (MAJOR DEPRESSIVE DISORDER), RECURRENT EPISODE, MODERATE (HCC): ICD-10-CM

## 2023-06-28 DIAGNOSIS — G47.00 INSOMNIA, PERSISTENT: ICD-10-CM

## 2023-06-28 RX ORDER — HYDROXYZINE HYDROCHLORIDE 25 MG/1
25 TABLET, FILM COATED ORAL 3 TIMES DAILY PRN
Qty: 270 TABLET | OUTPATIENT
Start: 2023-06-28

## 2023-07-01 DIAGNOSIS — G47.00 INSOMNIA, PERSISTENT: ICD-10-CM

## 2023-07-01 DIAGNOSIS — F41.1 GAD (GENERALIZED ANXIETY DISORDER): ICD-10-CM

## 2023-07-01 DIAGNOSIS — F33.1 MDD (MAJOR DEPRESSIVE DISORDER), RECURRENT EPISODE, MODERATE (HCC): ICD-10-CM

## 2023-07-03 RX ORDER — HYDROXYZINE HYDROCHLORIDE 25 MG/1
TABLET, FILM COATED ORAL
Qty: 60 TABLET | Refills: 1 | OUTPATIENT
Start: 2023-07-03

## 2023-07-21 ENCOUNTER — TELEMEDICINE (OUTPATIENT)
Dept: PSYCHIATRY | Age: 53
End: 2023-07-21

## 2023-07-21 DIAGNOSIS — F33.1 MDD (MAJOR DEPRESSIVE DISORDER), RECURRENT EPISODE, MODERATE (HCC): Primary | ICD-10-CM

## 2023-07-21 DIAGNOSIS — F41.1 GAD (GENERALIZED ANXIETY DISORDER): ICD-10-CM

## 2023-07-21 DIAGNOSIS — G47.00 INSOMNIA, PERSISTENT: ICD-10-CM

## 2023-07-21 RX ORDER — PAROXETINE HYDROCHLORIDE 20 MG/1
30 TABLET, FILM COATED ORAL EVERY MORNING
Qty: 135 TABLET | Refills: 0 | Status: SHIPPED | OUTPATIENT
Start: 2023-07-21 | End: 2023-10-19

## 2023-07-21 RX ORDER — HYDROXYZINE HYDROCHLORIDE 25 MG/1
25 TABLET, FILM COATED ORAL 3 TIMES DAILY PRN
Qty: 90 TABLET | Refills: 2 | Status: SHIPPED | OUTPATIENT
Start: 2023-07-21

## 2023-07-21 ASSESSMENT — PATIENT HEALTH QUESTIONNAIRE - PHQ9
3. TROUBLE FALLING OR STAYING ASLEEP: 0
1. LITTLE INTEREST OR PLEASURE IN DOING THINGS: 0
SUM OF ALL RESPONSES TO PHQ QUESTIONS 1-9: 1
6. FEELING BAD ABOUT YOURSELF - OR THAT YOU ARE A FAILURE OR HAVE LET YOURSELF OR YOUR FAMILY DOWN: 0
8. MOVING OR SPEAKING SO SLOWLY THAT OTHER PEOPLE COULD HAVE NOTICED. OR THE OPPOSITE, BEING SO FIGETY OR RESTLESS THAT YOU HAVE BEEN MOVING AROUND A LOT MORE THAN USUAL: 0
4. FEELING TIRED OR HAVING LITTLE ENERGY: 0
5. POOR APPETITE OR OVEREATING: 0
7. TROUBLE CONCENTRATING ON THINGS, SUCH AS READING THE NEWSPAPER OR WATCHING TELEVISION: 0
SUM OF ALL RESPONSES TO PHQ QUESTIONS 1-9: 1
SUM OF ALL RESPONSES TO PHQ QUESTIONS 1-9: 1
9. THOUGHTS THAT YOU WOULD BE BETTER OFF DEAD, OR OF HURTING YOURSELF: 0
2. FEELING DOWN, DEPRESSED OR HOPELESS: 1
10. IF YOU CHECKED OFF ANY PROBLEMS, HOW DIFFICULT HAVE THESE PROBLEMS MADE IT FOR YOU TO DO YOUR WORK, TAKE CARE OF THINGS AT HOME, OR GET ALONG WITH OTHER PEOPLE: 0
SUM OF ALL RESPONSES TO PHQ9 QUESTIONS 1 & 2: 1
SUM OF ALL RESPONSES TO PHQ QUESTIONS 1-9: 1

## 2023-07-21 ASSESSMENT — ANXIETY QUESTIONNAIRES
5. BEING SO RESTLESS THAT IT IS HARD TO SIT STILL: 0
2. NOT BEING ABLE TO STOP OR CONTROL WORRYING: 1
3. WORRYING TOO MUCH ABOUT DIFFERENT THINGS: 1
IF YOU CHECKED OFF ANY PROBLEMS ON THIS QUESTIONNAIRE, HOW DIFFICULT HAVE THESE PROBLEMS MADE IT FOR YOU TO DO YOUR WORK, TAKE CARE OF THINGS AT HOME, OR GET ALONG WITH OTHER PEOPLE: NOT DIFFICULT AT ALL
7. FEELING AFRAID AS IF SOMETHING AWFUL MIGHT HAPPEN: 0
1. FEELING NERVOUS, ANXIOUS, OR ON EDGE: 1
GAD7 TOTAL SCORE: 3
4. TROUBLE RELAXING: 0
6. BECOMING EASILY ANNOYED OR IRRITABLE: 0

## 2023-07-21 NOTE — PROGRESS NOTES
[address] [address]  Dept: [phone]  Dept Fax: [phone]  Loc: [phone]    Visit Date: 7/21/2023    SUBJECTIVE DATA       CHIEF COMPLAINT:    Chief Complaint   Patient presents with    Follow-up    Medication Check    Medication Refill    Mental Health Problem         History obtained from: patient    HISTORY OF PRESENT ILLNESS:    Bronwyn Louie is a 47 y.o. female who presents for follow up with medication management. HPI  Patient presents calm and cooperative. Patient states feeling \"good\" today. Patient endorses medication compliance, stated medications are working, symptoms improving. She stated \"this is the best I have felt for a long time\". She endorses some anxiety and worry some days, but improving. She reports noting some sexual side effects and weight gain with Paxil. Discussed risks vs. benefits of Paxil, patient would like to continue medications as it is helping with many symptoms. Discussed medication management with patient. Patient agreed with plan to continue medications at the same doses. Patient rates depression 1/10 and anxiety 2/10 today on a scale of 0-10 with 10 being the worst.   Sleep- able to fall asleep, able to stay asleep, feeling rested.  However having night hot flashes that wake her occasionally  Interest- improving  Energy and motivation - improving  Concentration - improving  Memory - improving  Appetite- good , has been watching her weight, but still gaining weight, gained 15 pounds since starting Paxil, but had lost 10 pounds when son was sick  Endorses- improving  Endorses less restlessness, improving  Denies hopelessness, helplessness, and worthlessness  Denies suicidal or homicidal ideation, plan, or intent  Denies auditory, visual, or other hallucinations  Denies paranoid delusions    NOTE: Patient previously reported having had hallucinations

## 2023-09-23 DIAGNOSIS — F41.1 GAD (GENERALIZED ANXIETY DISORDER): ICD-10-CM

## 2023-09-23 DIAGNOSIS — F33.1 MDD (MAJOR DEPRESSIVE DISORDER), RECURRENT EPISODE, MODERATE (HCC): ICD-10-CM

## 2023-09-23 DIAGNOSIS — G47.00 INSOMNIA, PERSISTENT: ICD-10-CM

## 2023-09-25 RX ORDER — PAROXETINE 10 MG/1
5 TABLET, FILM COATED ORAL DAILY
Qty: 45 TABLET | Refills: 1 | OUTPATIENT
Start: 2023-09-25 | End: 2024-03-23

## 2023-10-13 ENCOUNTER — TELEMEDICINE (OUTPATIENT)
Dept: PSYCHIATRY | Age: 53
End: 2023-10-13

## 2023-10-13 DIAGNOSIS — F33.1 MDD (MAJOR DEPRESSIVE DISORDER), RECURRENT EPISODE, MODERATE (HCC): Primary | ICD-10-CM

## 2023-10-13 DIAGNOSIS — F41.1 GAD (GENERALIZED ANXIETY DISORDER): ICD-10-CM

## 2023-10-13 DIAGNOSIS — G47.00 INSOMNIA, PERSISTENT: ICD-10-CM

## 2023-10-13 RX ORDER — PAROXETINE HYDROCHLORIDE 20 MG/1
30 TABLET, FILM COATED ORAL EVERY MORNING
Qty: 135 TABLET | Refills: 0 | Status: SHIPPED | OUTPATIENT
Start: 2023-10-13 | End: 2024-01-11

## 2023-10-13 ASSESSMENT — PATIENT HEALTH QUESTIONNAIRE - PHQ9
1. LITTLE INTEREST OR PLEASURE IN DOING THINGS: 0
4. FEELING TIRED OR HAVING LITTLE ENERGY: 1
7. TROUBLE CONCENTRATING ON THINGS, SUCH AS READING THE NEWSPAPER OR WATCHING TELEVISION: 0
8. MOVING OR SPEAKING SO SLOWLY THAT OTHER PEOPLE COULD HAVE NOTICED. OR THE OPPOSITE, BEING SO FIGETY OR RESTLESS THAT YOU HAVE BEEN MOVING AROUND A LOT MORE THAN USUAL: 0
3. TROUBLE FALLING OR STAYING ASLEEP: 0
2. FEELING DOWN, DEPRESSED OR HOPELESS: 1
5. POOR APPETITE OR OVEREATING: 0
9. THOUGHTS THAT YOU WOULD BE BETTER OFF DEAD, OR OF HURTING YOURSELF: 0
SUM OF ALL RESPONSES TO PHQ QUESTIONS 1-9: 2
10. IF YOU CHECKED OFF ANY PROBLEMS, HOW DIFFICULT HAVE THESE PROBLEMS MADE IT FOR YOU TO DO YOUR WORK, TAKE CARE OF THINGS AT HOME, OR GET ALONG WITH OTHER PEOPLE: 0
SUM OF ALL RESPONSES TO PHQ QUESTIONS 1-9: 2
6. FEELING BAD ABOUT YOURSELF - OR THAT YOU ARE A FAILURE OR HAVE LET YOURSELF OR YOUR FAMILY DOWN: 0
SUM OF ALL RESPONSES TO PHQ QUESTIONS 1-9: 2
SUM OF ALL RESPONSES TO PHQ9 QUESTIONS 1 & 2: 1
SUM OF ALL RESPONSES TO PHQ QUESTIONS 1-9: 2

## 2023-10-13 ASSESSMENT — ANXIETY QUESTIONNAIRES
2. NOT BEING ABLE TO STOP OR CONTROL WORRYING: 1
1. FEELING NERVOUS, ANXIOUS, OR ON EDGE: 1
4. TROUBLE RELAXING: 0
5. BEING SO RESTLESS THAT IT IS HARD TO SIT STILL: 0
3. WORRYING TOO MUCH ABOUT DIFFERENT THINGS: 1
7. FEELING AFRAID AS IF SOMETHING AWFUL MIGHT HAPPEN: 0
6. BECOMING EASILY ANNOYED OR IRRITABLE: 0
IF YOU CHECKED OFF ANY PROBLEMS ON THIS QUESTIONNAIRE, HOW DIFFICULT HAVE THESE PROBLEMS MADE IT FOR YOU TO DO YOUR WORK, TAKE CARE OF THINGS AT HOME, OR GET ALONG WITH OTHER PEOPLE: NOT DIFFICULT AT ALL
GAD7 TOTAL SCORE: 3

## 2023-10-13 NOTE — PROGRESS NOTES
days   Not being able to stop or control worrying Several days Several days Not at all   Worrying too much about different things Several days Several days Not at all   Trouble relaxing Not at all Not at all Not at all   Being so restless that it is hard to sit still Not at all Not at all Not at all   Becoming easily annoyed or irritable Not at all Not at all Not at all   Feeling afraid as if something awful might happen Not at all Not at all Not at all   DONAVON-7 Total Score 3 3 1   How difficult have these problems made it for you to do your work, take care of things at home, or get along with other people? Not difficult at all Not difficult at all Somewhat difficult         ICD-10-CM    1. MDD (major depressive disorder), recurrent episode, moderate (HCC)  F33.1 PARoxetine (PAXIL) 20 MG tablet      2. DONAVON (generalized anxiety disorder)  F41.1 PARoxetine (PAXIL) 20 MG tablet      3. Insomnia, persistent  G47.00 PARoxetine (PAXIL) 20 MG tablet          ASSESSMENT:  Patient struggling with some anxiety and depression due to situational stressors. However symptoms improving, and tolerable. Discussed medication management with patient. Patient agreed with plan to continue with current medication regimen. Recommended non-- medication based strategies for situational stressors that arise. The patient participated in and indicated understanding of the content of our discussion by agreeing to the mutually developed treatment plan. REVIEW OF SYSTEMS:    Review of Systems   Constitutional:  Positive for fatigue. Psychiatric/Behavioral:  Positive for dysphoric mood. The patient is nervous/anxious. All other systems reviewed and are negative. Records review of communications , labs, and medications from an internal/external source completed.     Psychosocial and Contextual Factors[de-identified]  Financial  Occupational  Relationships  Legal  Living situation  Educational    PLAN:      Medications:   Continue Paxil 30mg

## 2023-10-20 ENCOUNTER — TELEMEDICINE (OUTPATIENT)
Dept: PSYCHOLOGY | Age: 53
End: 2023-10-20
Payer: COMMERCIAL

## 2023-10-20 DIAGNOSIS — F43.23 ADJUSTMENT DISORDER WITH MIXED ANXIETY AND DEPRESSED MOOD: Primary | ICD-10-CM

## 2023-10-20 DIAGNOSIS — F41.1 GAD (GENERALIZED ANXIETY DISORDER): ICD-10-CM

## 2023-10-20 DIAGNOSIS — G47.00 INSOMNIA, PERSISTENT: ICD-10-CM

## 2023-10-20 DIAGNOSIS — F33.1 MDD (MAJOR DEPRESSIVE DISORDER), RECURRENT EPISODE, MODERATE (HCC): ICD-10-CM

## 2023-10-20 PROCEDURE — 90834 PSYTX W PT 45 MINUTES: CPT | Performed by: PSYCHOLOGIST

## 2023-10-20 RX ORDER — HYDROXYZINE HYDROCHLORIDE 25 MG/1
25 TABLET, FILM COATED ORAL 3 TIMES DAILY PRN
Qty: 90 TABLET | Refills: 2 | Status: SHIPPED | OUTPATIENT
Start: 2023-10-20

## 2023-10-20 NOTE — TELEPHONE ENCOUNTER
Matt Meza is requesting a refill on the following medications:  Requested Prescriptions     Pending Prescriptions Disp Refills    hydrOXYzine HCl (ATARAX) 25 MG tablet [Pharmacy Med Name: HYDROXYZINE HCL 25 MG TABLET] 270 tablet      Sig: TAKE 1 TABLET BY MOUTH 3 TIMES DAILY AS NEEDED FOR ANXIETY (AND INSOMNIA)       Date of last visit: 10/13/2023  Date of next visit (if applicable):1/11/2024  Pharmacy Name: Quinn Sarmiento, MA

## 2023-10-20 NOTE — PROGRESS NOTES
Psychotherapy Note  Orin Halsted. Marianela Gonzales Psy.D. Visit Date:  10/20/2023    Patient:  Sudeep Inman  YOB: 1970  Chief Complaint:  Follow-up, Depression, Anxiety, and Stress      Duration of session:  40 minutes      S:     Ct said things have been going well. Overall her mood is stable, she's happy, and not experiencing too much anxiety. Things with Gennaro Serrato have really settled down medically, so he's more stable. She said Heriberto's gone to [employer] with his dad, went on a family vacation to the [employer], etc., and it went well. She discussed some stressors with her other kids, helping Stephy Ang look for colleges, having her daughter feel anxious about starting at the new [employer]. Ct said she finally feels like she's coming out of a fog, feeling like she has a much clearer perspective of her situation. She has been engaging in things she enjoys like riding horses, etc.  She had surgery on her foot in Aug, so she hasn't been able to walk as much as she would like. She talked about trying to help her daughter with her anxiety, and some suggestions were provided. O:    Appearance    Patient presents as alert, oriented, and cooperative, tearful with mild distress  Appetite normal  Sleep disturbance Some  Loss of pleasure Some  Speech    clear and understandable  Mood    Dysthymic  Affect    anxiety  Thought Process    linear and coherent  Insight    Good  Judgment    Intact  Memory    recent and remote memory intact  Suicide Assessment    no suicidal ideation      A:    1. Adjustment disorder with mixed anxiety and depressed mood        Ct needing supportive psychotherapy to address stress, anxiety, coping, through the use of mindfulness and relying on the strength of her nikhil. P:    Send info to ct about starting up [employer] study, and tips for her daughter to help deal with anxiety. Sudeep Inman, was evaluated through a synchronous (real-time) audio-video encounter.  The

## 2023-11-27 NOTE — PATIENT INSTRUCTIONS
Thank you   1. Thank you for trusting us with your healthcare needs. You may receive a survey regarding today's visit. It would help us out if you would take a few moments to provide your feedback. We value your input. 2. Please bring in ALL medications BOTTLES, including inhalers, herbal supplements, over the counter, prescribed & non-prescribed medicine. The office would like actual medication bottles and a list.   3. Please note our OFFICE POLICIES:   a. Prior to getting your labs drawn, please check with your insurance company for benefits and eligibility of lab services. Often, insurance companies cover certain tests for preventative visits only. It is patient's responsibility to see what is covered. b. We are unable to change a diagnosis after the test has been performed. c. Lab orders will not be re-printed. Please hold onto your original lab orders and take them to your lab to be completed. d. If you no show your scheduled appointment three times, you will be dismissed from this practice. e. Ladean Merles must be completed 24 hours prior to your schedule appointment. 4. If the list below has been completed, PLEASE FAX RECORDS TO OUR OFFICE @ [phone].  Once the records have been received we will update your records at our office:  Health Maintenance Due   Topic Date Due    HIV screen  11/03/1986    DTaP/Tdap/Td vaccine (1 - Tdap) 11/03/1990    Cervical cancer screen  03/17/2016    Flu vaccine (1) 09/01/2019 .

## 2024-01-08 ENCOUNTER — TELEPHONE (OUTPATIENT)
Dept: FAMILY MEDICINE CLINIC | Age: 54
End: 2024-01-08

## 2024-01-08 DIAGNOSIS — E03.8 HYPOTHYROIDISM DUE TO HASHIMOTO'S THYROIDITIS: ICD-10-CM

## 2024-01-08 DIAGNOSIS — R53.83 TIRED: ICD-10-CM

## 2024-01-08 DIAGNOSIS — J45.20 MILD INTERMITTENT ASTHMA WITHOUT COMPLICATION: ICD-10-CM

## 2024-01-08 DIAGNOSIS — K90.0 CELIAC DISEASE: Primary | ICD-10-CM

## 2024-01-08 DIAGNOSIS — E78.9 LIPID DISORDER: ICD-10-CM

## 2024-01-08 DIAGNOSIS — G47.00 INSOMNIA, UNSPECIFIED TYPE: ICD-10-CM

## 2024-01-08 DIAGNOSIS — Z71.89 ENCOUNTER FOR HERB AND VITAMIN SUPPLEMENT MANAGEMENT: ICD-10-CM

## 2024-01-08 DIAGNOSIS — E06.3 HYPOTHYROIDISM DUE TO HASHIMOTO'S THYROIDITIS: ICD-10-CM

## 2024-01-08 NOTE — TELEPHONE ENCOUNTER
Pt called.  Said she has a visit 1/19/2024 with son Ralph.  She has been having weight gain.  Wants to have the basics, hormones, and will also be getting her thyroid panels, cbc, bmp done by Dary Nunez, DO at [facility] at the same time.    She wants to cut down cost and since you can see her results in [facility], she will just get hers completed at the same time.    Orders pended, please review, add diagnosis, approve or deny,    Wants faxed to [facility] in [place].  Please send back to Me so I can print them off when completed.

## 2024-01-11 NOTE — PROGRESS NOTES
[facility] [place] [facility]  [facility]  [address] [id number] [phone]    Progress Note    Patient:  Irina Wadsworth  YOB: 1970  PCP:  Dary Nunez DO  Visit Date:  1/12/2024    TELEHEALTH EVALUATION     CC:   Chief Complaint   Patient presents with    Follow-up    Medication Check    Medication Refill    Mental Health Problem        History obtained from: patient    SUBJECTIVE:    Irina Wadsworth, a 53 y.o. female, presents for a follow up visit.  Patient reports she is doing well.  Patient is compliant with medication regimen.  Patient states feeling \"good\" today. Patient rates depression 1/10 and anxiety 1/10 today on a scale of 0-10 with 10 being the worst. Patient endorses occasional situational stressors with business of son's senior year in sports which increase anxiety, however symptoms are improving and tolerable. Patient reports using coping strategies to calm anxiety. She feels Prozac is working well.    - Denies new medical conditions or psychiatric admissions since seen by this provider   - Current psychiatric medications: Paxil 30mg, hydroxyzine HCL 25mg prn TID  -Past psychiatric medications include: Buspar for 1 year, hydroxyzine, zoloft, wellbutrin  - Denies adverse reactions from psychotropic medications  - Assets/Support system:   , neighbors, Hindu  - Coping skills: being in nature, take walks, being around animals  - Feels safe at home-  yes  - Current Substance Use: CBD daily  - Medication Side Effects: Absent    Psychotherapy Note  Time spent in psychotherapy: 16 minutes  Modality:  MI, supportive therapy, reflective listening  Goals: decrease anxiety  Focus: insight orientation, supportive. Reviewed mindfulness techniques. The patient was engaged and responsive throughout session.     OBJECTIVE:       No data to display                 ALLERGIES:    Gluten meal     MENTAL STATUS

## 2024-01-12 ENCOUNTER — TELEMEDICINE (OUTPATIENT)
Dept: PSYCHIATRY | Age: 54
End: 2024-01-12

## 2024-01-12 ENCOUNTER — NURSE ONLY (OUTPATIENT)
Age: 54
End: 2024-01-12

## 2024-01-12 DIAGNOSIS — F41.1 GAD (GENERALIZED ANXIETY DISORDER): ICD-10-CM

## 2024-01-12 DIAGNOSIS — G47.00 INSOMNIA, PERSISTENT: ICD-10-CM

## 2024-01-12 DIAGNOSIS — F33.1 MDD (MAJOR DEPRESSIVE DISORDER), RECURRENT EPISODE, MODERATE (HCC): Primary | ICD-10-CM

## 2024-01-12 LAB
ALBUMIN SERPL BCG-MCNC: 4.3 G/DL (ref 3.5–5.1)
ALP SERPL-CCNC: 60 U/L (ref 38–126)
ALT SERPL W/O P-5'-P-CCNC: 20 U/L (ref 11–66)
ANION GAP SERPL CALC-SCNC: 8 MEQ/L (ref 8–16)
AST SERPL-CCNC: 21 U/L (ref 5–40)
BASOPHILS ABSOLUTE: 0 THOU/MM3 (ref 0–0.1)
BASOPHILS NFR BLD AUTO: 0.8 %
BILIRUB SERPL-MCNC: 0.4 MG/DL (ref 0.3–1.2)
BUN SERPL-MCNC: 19 MG/DL (ref 7–22)
CALCIUM SERPL-MCNC: 9.1 MG/DL (ref 8.5–10.5)
CHLORIDE SERPL-SCNC: 102 MEQ/L (ref 98–111)
CHOLEST SERPL-MCNC: 198 MG/DL (ref 100–199)
CO2 SERPL-SCNC: 27 MEQ/L (ref 23–33)
CREAT SERPL-MCNC: 0.7 MG/DL (ref 0.4–1.2)
DEPRECATED RDW RBC AUTO: 46.5 FL (ref 35–45)
EOSINOPHIL NFR BLD AUTO: 1.8 %
EOSINOPHILS ABSOLUTE: 0.1 THOU/MM3 (ref 0–0.4)
ERYTHROCYTE [DISTWIDTH] IN BLOOD BY AUTOMATED COUNT: 14.4 % (ref 11.5–14.5)
GFR SERPL CREATININE-BSD FRML MDRD: > 60 ML/MIN/1.73M2
GLUCOSE SERPL-MCNC: 122 MG/DL (ref 70–108)
HCT VFR BLD AUTO: 39.2 % (ref 37–47)
HDLC SERPL-MCNC: 77 MG/DL
HGB BLD-MCNC: 12.4 GM/DL (ref 12–16)
IMM GRANULOCYTES # BLD AUTO: 0.01 THOU/MM3 (ref 0–0.07)
IMM GRANULOCYTES NFR BLD AUTO: 0.2 %
LDLC SERPL CALC-MCNC: 113 MG/DL
LYMPHOCYTES ABSOLUTE: 1.6 THOU/MM3 (ref 1–4.8)
LYMPHOCYTES NFR BLD AUTO: 32.7 %
MCH RBC QN AUTO: 28.1 PG (ref 26–33)
MCHC RBC AUTO-ENTMCNC: 31.6 GM/DL (ref 32.2–35.5)
MCV RBC AUTO: 88.9 FL (ref 81–99)
MONOCYTES ABSOLUTE: 0.4 THOU/MM3 (ref 0.4–1.3)
MONOCYTES NFR BLD AUTO: 7.3 %
NEUTROPHILS NFR BLD AUTO: 57.2 %
NRBC BLD AUTO-RTO: 0 /100 WBC
PLATELET # BLD AUTO: 276 THOU/MM3 (ref 130–400)
PMV BLD AUTO: 10.2 FL (ref 9.4–12.4)
POTASSIUM SERPL-SCNC: 4.2 MEQ/L (ref 3.5–5.2)
PROT SERPL-MCNC: 6.7 G/DL (ref 6.1–8)
RBC # BLD AUTO: 4.41 MILL/MM3 (ref 4.2–5.4)
SEGMENTED NEUTROPHILS ABSOLUTE COUNT: 2.8 THOU/MM3 (ref 1.8–7.7)
SODIUM SERPL-SCNC: 137 MEQ/L (ref 135–145)
T3FREE SERPL-MCNC: 2.59 PG/ML (ref 2.02–4.43)
T4 FREE SERPL-MCNC: 1.04 NG/DL (ref 0.93–1.76)
TRIGL SERPL-MCNC: 42 MG/DL (ref 0–199)
TSH SERPL DL<=0.005 MIU/L-ACNC: 2.27 UIU/ML (ref 0.4–4.2)
WBC # BLD AUTO: 4.9 THOU/MM3 (ref 4.8–10.8)

## 2024-01-12 RX ORDER — PAROXETINE HYDROCHLORIDE 20 MG/1
30 TABLET, FILM COATED ORAL EVERY MORNING
Qty: 135 TABLET | Refills: 0 | Status: SHIPPED | OUTPATIENT
Start: 2024-01-12 | End: 2024-04-11

## 2024-01-12 ASSESSMENT — PATIENT HEALTH QUESTIONNAIRE - PHQ9
9. THOUGHTS THAT YOU WOULD BE BETTER OFF DEAD, OR OF HURTING YOURSELF: 0
SUM OF ALL RESPONSES TO PHQ QUESTIONS 1-9: 1
8. MOVING OR SPEAKING SO SLOWLY THAT OTHER PEOPLE COULD HAVE NOTICED. OR THE OPPOSITE, BEING SO FIGETY OR RESTLESS THAT YOU HAVE BEEN MOVING AROUND A LOT MORE THAN USUAL: 0
6. FEELING BAD ABOUT YOURSELF - OR THAT YOU ARE A FAILURE OR HAVE LET YOURSELF OR YOUR FAMILY DOWN: 0
SUM OF ALL RESPONSES TO PHQ9 QUESTIONS 1 & 2: 1
10. IF YOU CHECKED OFF ANY PROBLEMS, HOW DIFFICULT HAVE THESE PROBLEMS MADE IT FOR YOU TO DO YOUR WORK, TAKE CARE OF THINGS AT HOME, OR GET ALONG WITH OTHER PEOPLE: 0
1. LITTLE INTEREST OR PLEASURE IN DOING THINGS: 0
7. TROUBLE CONCENTRATING ON THINGS, SUCH AS READING THE NEWSPAPER OR WATCHING TELEVISION: 0
SUM OF ALL RESPONSES TO PHQ QUESTIONS 1-9: 1
SUM OF ALL RESPONSES TO PHQ QUESTIONS 1-9: 1
5. POOR APPETITE OR OVEREATING: 0
3. TROUBLE FALLING OR STAYING ASLEEP: 0
2. FEELING DOWN, DEPRESSED OR HOPELESS: 1
4. FEELING TIRED OR HAVING LITTLE ENERGY: 0
SUM OF ALL RESPONSES TO PHQ QUESTIONS 1-9: 1

## 2024-01-12 ASSESSMENT — ANXIETY QUESTIONNAIRES
IF YOU CHECKED OFF ANY PROBLEMS ON THIS QUESTIONNAIRE, HOW DIFFICULT HAVE THESE PROBLEMS MADE IT FOR YOU TO DO YOUR WORK, TAKE CARE OF THINGS AT HOME, OR GET ALONG WITH OTHER PEOPLE: SOMEWHAT DIFFICULT
3. WORRYING TOO MUCH ABOUT DIFFERENT THINGS: 0
2. NOT BEING ABLE TO STOP OR CONTROL WORRYING: 0
6. BECOMING EASILY ANNOYED OR IRRITABLE: 0
7. FEELING AFRAID AS IF SOMETHING AWFUL MIGHT HAPPEN: 0
5. BEING SO RESTLESS THAT IT IS HARD TO SIT STILL: 0
1. FEELING NERVOUS, ANXIOUS, OR ON EDGE: 1
4. TROUBLE RELAXING: 0
GAD7 TOTAL SCORE: 1

## 2024-01-15 ENCOUNTER — HOSPITAL ENCOUNTER (OUTPATIENT)
Age: 54
Discharge: HOME | End: 2024-01-15
Payer: COMMERCIAL

## 2024-01-15 DIAGNOSIS — J45.20 MILD INTERMITTENT ASTHMA WITHOUT COMPLICATION: ICD-10-CM

## 2024-01-15 DIAGNOSIS — Z71.89 ENCOUNTER FOR HERB AND VITAMIN SUPPLEMENT MANAGEMENT: ICD-10-CM

## 2024-01-15 DIAGNOSIS — E03.8 HYPOTHYROIDISM DUE TO HASHIMOTO'S THYROIDITIS: ICD-10-CM

## 2024-01-15 DIAGNOSIS — G47.00 INSOMNIA, UNSPECIFIED TYPE: ICD-10-CM

## 2024-01-15 DIAGNOSIS — E06.3 HYPOTHYROIDISM DUE TO HASHIMOTO'S THYROIDITIS: ICD-10-CM

## 2024-01-15 DIAGNOSIS — R53.83 TIRED: ICD-10-CM

## 2024-01-15 DIAGNOSIS — E78.9 LIPID DISORDER: ICD-10-CM

## 2024-01-15 DIAGNOSIS — K90.0 CELIAC DISEASE: ICD-10-CM

## 2024-01-15 LAB
25(OH)D3 SERPL-MCNC: 35 NG/ML (ref 30–100)
CALCIUM SERPL-MCNC: 9.5 MG/DL (ref 8.5–10.5)
DEPRECATED MEAN GLUCOSE BLD GHB EST-ACNC: 99 MG/DL (ref 70–126)
HBA1C MFR BLD HPLC: 5.3 % (ref 4.4–6.4)
MAGNESIUM SERPL-MCNC: 2.2 MG/DL (ref 1.6–2.4)
PROGEST SERPL-MCNC: 0.06 NG/ML
RHEUMATOID FACT SERPL-ACNC: < 10 IU/ML (ref 0–13)

## 2024-01-15 PROCEDURE — 84402 ASSAY OF FREE TESTOSTERONE: CPT

## 2024-01-15 PROCEDURE — 82310 ASSAY OF CALCIUM: CPT

## 2024-01-15 PROCEDURE — 83970 ASSAY OF PARATHORMONE: CPT

## 2024-01-15 PROCEDURE — 83735 ASSAY OF MAGNESIUM: CPT

## 2024-01-15 PROCEDURE — 82627 DEHYDROEPIANDROSTERONE: CPT

## 2024-01-15 PROCEDURE — 82670 ASSAY OF TOTAL ESTRADIOL: CPT

## 2024-01-15 PROCEDURE — 86141 C-REACTIVE PROTEIN HS: CPT

## 2024-01-15 PROCEDURE — 83516 IMMUNOASSAY NONANTIBODY: CPT

## 2024-01-15 PROCEDURE — 36415 COLL VENOUS BLD VENIPUNCTURE: CPT

## 2024-01-15 PROCEDURE — 86430 RHEUMATOID FACTOR TEST QUAL: CPT

## 2024-01-15 PROCEDURE — 82306 VITAMIN D 25 HYDROXY: CPT

## 2024-01-15 PROCEDURE — 83002 ASSAY OF GONADOTROPIN (LH): CPT

## 2024-01-15 PROCEDURE — 82626 DEHYDROEPIANDROSTERONE: CPT

## 2024-01-15 PROCEDURE — 84270 ASSAY OF SEX HORMONE GLOBUL: CPT

## 2024-01-15 PROCEDURE — 85651 RBC SED RATE NONAUTOMATED: CPT

## 2024-01-15 PROCEDURE — 84403 ASSAY OF TOTAL TESTOSTERONE: CPT

## 2024-01-15 PROCEDURE — 83001 ASSAY OF GONADOTROPIN (FSH): CPT

## 2024-01-15 PROCEDURE — 84144 ASSAY OF PROGESTERONE: CPT

## 2024-01-16 DIAGNOSIS — G47.00 INSOMNIA, PERSISTENT: ICD-10-CM

## 2024-01-16 DIAGNOSIS — F41.1 GAD (GENERALIZED ANXIETY DISORDER): ICD-10-CM

## 2024-01-16 DIAGNOSIS — F33.1 MDD (MAJOR DEPRESSIVE DISORDER), RECURRENT EPISODE, MODERATE (HCC): ICD-10-CM

## 2024-01-16 LAB
C-REACTIVE PROTEIN, HIGH SENSITIVITY: 3 MG/L
DHEA-S SERPL-MCNC: 260 UG/DL (ref 35.4–256)
ERYTHROCYTE [SEDIMENTATION RATE] IN BLOOD BY WESTERGREN METHOD: 3 MM/HR (ref 0–20)
ESTRADIOL LEVEL: 98 PG/ML
FOLLICLE STIMULATING HORMONE: 12.9 MIU/ML
LUTEINIZING HORMONE: 8.6 MIU/ML (ref 1.7–8.6)
PTH-INTACT SERPL-MCNC: 56.3 PG/ML (ref 15–65)

## 2024-01-16 RX ORDER — HYDROXYZINE HYDROCHLORIDE 25 MG/1
25 TABLET, FILM COATED ORAL 3 TIMES DAILY PRN
Qty: 270 TABLET | OUTPATIENT
Start: 2024-01-16

## 2024-01-16 NOTE — TELEPHONE ENCOUNTER
Patient maintained stable temperatures in Giraffe isolette on air mode. Remained stable on high flow nasal cannula without significant desaturation. No heart murmur. Baby had a few self-resolved episodes during shift. Tolerating NG feeds without emesis.  Vo Please check with patient as I think she said she is not needing it currently. If so, please tell her she can call for a refill if needed.

## 2024-01-16 NOTE — TELEPHONE ENCOUNTER
Pharmacy is requesting a refill of the following medication:    Hydroxyzine HCL 25mg  #270 with no refills to the [facility] in [place]. Previous script sent on 10/20/23 for #90 with 2 refills.    Last visit 1/12/24  Next visit 04/05/24    Pending your approval.

## 2024-01-18 LAB — GLIADIN IGG SER IA-ACNC: 0.9 U/ML

## 2024-01-19 ENCOUNTER — TELEMEDICINE (OUTPATIENT)
Dept: FAMILY MEDICINE CLINIC | Age: 54
End: 2024-01-19
Payer: COMMERCIAL

## 2024-01-19 DIAGNOSIS — N80.9 ENDOMETRIOSIS: ICD-10-CM

## 2024-01-19 DIAGNOSIS — R53.83 TIRED: ICD-10-CM

## 2024-01-19 DIAGNOSIS — K90.0 CELIAC DISEASE: Primary | ICD-10-CM

## 2024-01-19 DIAGNOSIS — J45.20 MILD INTERMITTENT ASTHMA WITHOUT COMPLICATION: ICD-10-CM

## 2024-01-19 DIAGNOSIS — E78.9 LIPID DISORDER: ICD-10-CM

## 2024-01-19 DIAGNOSIS — Z71.89 ENCOUNTER FOR HERB AND VITAMIN SUPPLEMENT MANAGEMENT: ICD-10-CM

## 2024-01-19 DIAGNOSIS — E03.8 HYPOTHYROIDISM DUE TO HASHIMOTO'S THYROIDITIS: ICD-10-CM

## 2024-01-19 DIAGNOSIS — C18.9 MALIGNANT NEOPLASM OF COLON, UNSPECIFIED PART OF COLON (HCC): ICD-10-CM

## 2024-01-19 DIAGNOSIS — E06.3 HYPOTHYROIDISM DUE TO HASHIMOTO'S THYROIDITIS: ICD-10-CM

## 2024-01-19 DIAGNOSIS — F41.9 ANXIETY: ICD-10-CM

## 2024-01-19 DIAGNOSIS — G47.00 INSOMNIA, UNSPECIFIED TYPE: ICD-10-CM

## 2024-01-19 PROBLEM — N64.9 BREAST DISORDER: Status: ACTIVE | Noted: 2020-06-16

## 2024-01-19 LAB
DHEA SERPL-MCNC: 5.19 NG/ML (ref 0.63–4.7)
TESTOSTERONE, FREE W SHGB, FEMALES/CHILDREN: NORMAL

## 2024-01-19 PROCEDURE — 99215 OFFICE O/P EST HI 40 MIN: CPT | Performed by: FAMILY MEDICINE

## 2024-04-18 ENCOUNTER — TELEMEDICINE (OUTPATIENT)
Dept: PSYCHIATRY | Age: 54
End: 2024-04-18

## 2024-04-18 DIAGNOSIS — F33.0 MDD (MAJOR DEPRESSIVE DISORDER), RECURRENT EPISODE, MILD (HCC): ICD-10-CM

## 2024-04-18 DIAGNOSIS — F41.1 GAD (GENERALIZED ANXIETY DISORDER): Primary | ICD-10-CM

## 2024-04-18 ASSESSMENT — PATIENT HEALTH QUESTIONNAIRE - PHQ9
SUM OF ALL RESPONSES TO PHQ9 QUESTIONS 1 & 2: 1
4. FEELING TIRED OR HAVING LITTLE ENERGY: SEVERAL DAYS
SUM OF ALL RESPONSES TO PHQ QUESTIONS 1-9: 2
10. IF YOU CHECKED OFF ANY PROBLEMS, HOW DIFFICULT HAVE THESE PROBLEMS MADE IT FOR YOU TO DO YOUR WORK, TAKE CARE OF THINGS AT HOME, OR GET ALONG WITH OTHER PEOPLE: NOT DIFFICULT AT ALL
1. LITTLE INTEREST OR PLEASURE IN DOING THINGS: NOT AT ALL
9. THOUGHTS THAT YOU WOULD BE BETTER OFF DEAD, OR OF HURTING YOURSELF: NOT AT ALL
7. TROUBLE CONCENTRATING ON THINGS, SUCH AS READING THE NEWSPAPER OR WATCHING TELEVISION: NOT AT ALL
5. POOR APPETITE OR OVEREATING: NOT AT ALL
8. MOVING OR SPEAKING SO SLOWLY THAT OTHER PEOPLE COULD HAVE NOTICED. OR THE OPPOSITE, BEING SO FIGETY OR RESTLESS THAT YOU HAVE BEEN MOVING AROUND A LOT MORE THAN USUAL: NOT AT ALL
SUM OF ALL RESPONSES TO PHQ QUESTIONS 1-9: 2
6. FEELING BAD ABOUT YOURSELF - OR THAT YOU ARE A FAILURE OR HAVE LET YOURSELF OR YOUR FAMILY DOWN: NOT AT ALL
SUM OF ALL RESPONSES TO PHQ QUESTIONS 1-9: 2
2. FEELING DOWN, DEPRESSED OR HOPELESS: SEVERAL DAYS
3. TROUBLE FALLING OR STAYING ASLEEP: NOT AT ALL
SUM OF ALL RESPONSES TO PHQ QUESTIONS 1-9: 2

## 2024-04-18 ASSESSMENT — ANXIETY QUESTIONNAIRES
4. TROUBLE RELAXING: NOT AT ALL
1. FEELING NERVOUS, ANXIOUS, OR ON EDGE: NOT AT ALL
6. BECOMING EASILY ANNOYED OR IRRITABLE: SEVERAL DAYS
5. BEING SO RESTLESS THAT IT IS HARD TO SIT STILL: NOT AT ALL
2. NOT BEING ABLE TO STOP OR CONTROL WORRYING: NOT AT ALL
IF YOU CHECKED OFF ANY PROBLEMS ON THIS QUESTIONNAIRE, HOW DIFFICULT HAVE THESE PROBLEMS MADE IT FOR YOU TO DO YOUR WORK, TAKE CARE OF THINGS AT HOME, OR GET ALONG WITH OTHER PEOPLE: NOT DIFFICULT AT ALL
GAD7 TOTAL SCORE: 2
7. FEELING AFRAID AS IF SOMETHING AWFUL MIGHT HAPPEN: SEVERAL DAYS
3. WORRYING TOO MUCH ABOUT DIFFERENT THINGS: NOT AT ALL

## 2024-04-18 NOTE — PROGRESS NOTES
[facility] [place] [facility]  [facility]  [address] [address] [phone]    Progress Note    Patient:  Irina Wadsworth  YOB: 1970  PCP:  Dary Nunez DO  Visit Date:  4/18/2024    TELEHEALTH EVALUATION     CC:   Chief Complaint   Patient presents with    Follow-up    Medication Check    Medication Refill    Mental Health Problem        History obtained from: patient    SUBJECTIVE:    Irina Wadsworth, a 53 y.o. female, presents for a follow up visit.  Patient reports she is doing well.  Patient is compliant with medication regimen.  Patient states feeling \"okay\" today. Patient rates depression 2/10 and anxiety 2/10 today on a scale of 0-10 with 10 being the worst. Patient endorses some situational stressors increasing anxiety.  Stated her son is graduating next month with the party at her house, with a lot to prepare.  She also reports started new medication for heart spasms 2 months ago, which has improved her anxiety, feeling more calm but causing fatigue, \"not as bouncy, slowed me down\". However depression and anxiety are manageable and tolerable.  Patient denies using or needing hydroxyzine for anxiety.    - Denies new medical conditions or psychiatric admissions since seen by this provider   - Current psychiatric medications: Paxil 30mg  - Past psychiatric medications include:  hydroxyzine HCL, Buspar for 1 year, hydroxyzine, zoloft, wellbutrin  - Denies adverse reactions from psychotropic medications  - Assets/Support system:   , neighbors, Episcopalian  - Coping skills: being in nature, take walks, being around animals  - Feels safe at home-  Yes  - Current Substance Use: CBD daily  - Medication Side Effects: Absent    Psychotherapy Note  Time spent in psychotherapy: 16 minutes  Modality:  MI, supportive therapy, reflective listening  Goals: decrease anxiety  Focus: insight orientation, supportive. Reviewed

## 2024-04-24 DIAGNOSIS — F33.1 MDD (MAJOR DEPRESSIVE DISORDER), RECURRENT EPISODE, MODERATE (HCC): ICD-10-CM

## 2024-04-24 DIAGNOSIS — G47.00 INSOMNIA, PERSISTENT: ICD-10-CM

## 2024-04-24 DIAGNOSIS — F41.1 GAD (GENERALIZED ANXIETY DISORDER): ICD-10-CM

## 2024-04-24 RX ORDER — PAROXETINE 10 MG/1
5 TABLET, FILM COATED ORAL EVERY MORNING
Qty: 15 TABLET | Refills: 0 | Status: SHIPPED | OUTPATIENT
Start: 2024-04-24

## 2024-04-24 NOTE — TELEPHONE ENCOUNTER
Sherrie wrote into the office via flaregames:    Mal Balderas,   At my virtual appointment we talked about lowering my dosage of Paxil to 25mg instead of 30. You were going to send a RX to [facility] for a 10mg that I could break in half. I checked at [facility] in [place] and they said they haven't received that RX yet. Would you send that dosage refill to [facility] for me?  Thanks!  Sherrie Wadsworth    Per chart; the last Rx was sent for Paxil 20mg;#135 with 0 refills on 01/12/24. *I have loaded a Paxil 10mg (take 0.5 tablets daily);#15 with 0 refills. Please advise otherwise. She was seen yesterday and is scheduled to return on 05/30/24.

## 2024-05-21 DIAGNOSIS — G47.00 INSOMNIA, PERSISTENT: ICD-10-CM

## 2024-05-21 DIAGNOSIS — F33.1 MDD (MAJOR DEPRESSIVE DISORDER), RECURRENT EPISODE, MODERATE (HCC): ICD-10-CM

## 2024-05-21 DIAGNOSIS — F41.1 GAD (GENERALIZED ANXIETY DISORDER): ICD-10-CM

## 2024-05-21 RX ORDER — PAROXETINE 10 MG/1
TABLET, FILM COATED ORAL
Qty: 45 TABLET | Refills: 1 | Status: SHIPPED | OUTPATIENT
Start: 2024-05-21

## 2024-05-21 NOTE — TELEPHONE ENCOUNTER
[facility] is requesting a medication refill on Sherrie's behalf for Paxil 10mg;#15 with 0 refills; last with a start and refill date of 04/24/24.     Medication is pending your approval for a 30 day supply with 1 refills. She is scheduled to return on 05/30/24. She was last seen on 04/18/24.

## 2024-06-13 DIAGNOSIS — F33.1 MDD (MAJOR DEPRESSIVE DISORDER), RECURRENT EPISODE, MODERATE (HCC): ICD-10-CM

## 2024-06-13 DIAGNOSIS — G47.00 INSOMNIA, PERSISTENT: ICD-10-CM

## 2024-06-13 DIAGNOSIS — F41.1 GAD (GENERALIZED ANXIETY DISORDER): ICD-10-CM

## 2024-06-13 RX ORDER — PAROXETINE HYDROCHLORIDE 20 MG/1
TABLET, FILM COATED ORAL
Qty: 135 TABLET | Refills: 0 | OUTPATIENT
Start: 2024-06-13

## 2024-06-18 ENCOUNTER — TELEPHONE (OUTPATIENT)
Dept: FAMILY MEDICINE CLINIC | Age: 54
End: 2024-06-18

## 2024-06-18 NOTE — PROGRESS NOTES
4/18/2024     3:02 PM 1/12/2024    10:55 AM   DONAVON-7 SCREENING   Feeling nervous, anxious, or on edge Several days Not at all Several days   Not being able to stop or control worrying Not at all Not at all Not at all   Worrying too much about different things Not at all Not at all Not at all   Trouble relaxing Not at all Not at all Not at all   Being so restless that it is hard to sit still Not at all Not at all Not at all   Becoming easily annoyed or irritable Several days Several days Not at all   Feeling afraid as if something awful might happen Not at all Several days Not at all   DONAVON-7 Total Score 2 2 1   How difficult have these problems made it for you to do your work, take care of things at home, or get along with other people? Not difficult at all Not difficult at all Somewhat difficult         ICD-10-CM    1. DONAVON (generalized anxiety disorder)  F41.1 PARoxetine (PAXIL) 20 MG tablet     PARoxetine (PAXIL) 10 MG tablet      2. MDD (major depressive disorder), recurrent episode, mild (HCC)  F33.0 PARoxetine (PAXIL) 20 MG tablet     PARoxetine (PAXIL) 10 MG tablet          ASSESSMENT:  Patient endorses some anxiety due to situational stressors. However vacation was helpful and symptoms are improving, more manageable and tolerable.  She reports fatigue has been improving with the decrease in Paxil as well as her heart medication dose was decreased recently too, feeling increased motivation/energy.  Discussed medication management with patient.  Patient agreed with plan to continue current medication regimen.  Recommended non-- medication based strategies for situational stressors.  The patient participated in and indicated understanding of the content of our discussion by agreeing to the mutually developed treatment plan.    REVIEW OF SYSTEMS:    Review of Systems   Constitutional:  Positive for fatigue.   Psychiatric/Behavioral:  The patient is nervous/anxious.    All other systems reviewed and are

## 2024-06-18 NOTE — TELEPHONE ENCOUNTER
6/11/2024- we need to change to virtual or reschedule-almazan     6/18/24-  we need to change appt or make virtual.  Also sent a my chart message stating the same thing.   Please call us back.

## 2024-06-26 ENCOUNTER — TELEMEDICINE (OUTPATIENT)
Dept: PSYCHIATRY | Age: 54
End: 2024-06-26
Payer: COMMERCIAL

## 2024-06-26 DIAGNOSIS — F33.0 MDD (MAJOR DEPRESSIVE DISORDER), RECURRENT EPISODE, MILD (HCC): ICD-10-CM

## 2024-06-26 DIAGNOSIS — F41.1 GAD (GENERALIZED ANXIETY DISORDER): Primary | ICD-10-CM

## 2024-06-26 PROCEDURE — 99214 OFFICE O/P EST MOD 30 MIN: CPT

## 2024-06-26 PROCEDURE — 90833 PSYTX W PT W E/M 30 MIN: CPT

## 2024-06-26 RX ORDER — PAROXETINE HYDROCHLORIDE 20 MG/1
20 TABLET, FILM COATED ORAL DAILY
Qty: 90 TABLET | Refills: 0 | Status: SHIPPED | OUTPATIENT
Start: 2024-06-26

## 2024-06-26 RX ORDER — PAROXETINE 10 MG/1
5 TABLET, FILM COATED ORAL EVERY MORNING
Qty: 45 TABLET | Refills: 0 | Status: SHIPPED | OUTPATIENT
Start: 2024-06-26

## 2024-06-26 ASSESSMENT — ANXIETY QUESTIONNAIRES
7. FEELING AFRAID AS IF SOMETHING AWFUL MIGHT HAPPEN: NOT AT ALL
5. BEING SO RESTLESS THAT IT IS HARD TO SIT STILL: NOT AT ALL
4. TROUBLE RELAXING: NOT AT ALL
GAD7 TOTAL SCORE: 2
6. BECOMING EASILY ANNOYED OR IRRITABLE: SEVERAL DAYS
IF YOU CHECKED OFF ANY PROBLEMS ON THIS QUESTIONNAIRE, HOW DIFFICULT HAVE THESE PROBLEMS MADE IT FOR YOU TO DO YOUR WORK, TAKE CARE OF THINGS AT HOME, OR GET ALONG WITH OTHER PEOPLE: NOT DIFFICULT AT ALL
1. FEELING NERVOUS, ANXIOUS, OR ON EDGE: SEVERAL DAYS
2. NOT BEING ABLE TO STOP OR CONTROL WORRYING: NOT AT ALL
3. WORRYING TOO MUCH ABOUT DIFFERENT THINGS: NOT AT ALL

## 2024-06-26 ASSESSMENT — PATIENT HEALTH QUESTIONNAIRE - PHQ9
7. TROUBLE CONCENTRATING ON THINGS, SUCH AS READING THE NEWSPAPER OR WATCHING TELEVISION: NOT AT ALL
SUM OF ALL RESPONSES TO PHQ9 QUESTIONS 1 & 2: 0
5. POOR APPETITE OR OVEREATING: NOT AT ALL
6. FEELING BAD ABOUT YOURSELF - OR THAT YOU ARE A FAILURE OR HAVE LET YOURSELF OR YOUR FAMILY DOWN: NOT AT ALL
9. THOUGHTS THAT YOU WOULD BE BETTER OFF DEAD, OR OF HURTING YOURSELF: NOT AT ALL
SUM OF ALL RESPONSES TO PHQ QUESTIONS 1-9: 1
SUM OF ALL RESPONSES TO PHQ QUESTIONS 1-9: 1
8. MOVING OR SPEAKING SO SLOWLY THAT OTHER PEOPLE COULD HAVE NOTICED. OR THE OPPOSITE, BEING SO FIGETY OR RESTLESS THAT YOU HAVE BEEN MOVING AROUND A LOT MORE THAN USUAL: NOT AT ALL
1. LITTLE INTEREST OR PLEASURE IN DOING THINGS: NOT AT ALL
3. TROUBLE FALLING OR STAYING ASLEEP: NOT AT ALL
4. FEELING TIRED OR HAVING LITTLE ENERGY: SEVERAL DAYS
2. FEELING DOWN, DEPRESSED OR HOPELESS: NOT AT ALL
SUM OF ALL RESPONSES TO PHQ QUESTIONS 1-9: 1
SUM OF ALL RESPONSES TO PHQ QUESTIONS 1-9: 1
10. IF YOU CHECKED OFF ANY PROBLEMS, HOW DIFFICULT HAVE THESE PROBLEMS MADE IT FOR YOU TO DO YOUR WORK, TAKE CARE OF THINGS AT HOME, OR GET ALONG WITH OTHER PEOPLE: NOT DIFFICULT AT ALL

## 2024-08-16 ENCOUNTER — LAB (OUTPATIENT)
Dept: LAB | Age: 54
End: 2024-08-16

## 2024-08-16 DIAGNOSIS — E06.3 HYPOTHYROIDISM DUE TO HASHIMOTO'S THYROIDITIS: ICD-10-CM

## 2024-08-16 DIAGNOSIS — R53.83 TIRED: ICD-10-CM

## 2024-08-16 DIAGNOSIS — G47.00 INSOMNIA, UNSPECIFIED TYPE: ICD-10-CM

## 2024-08-16 DIAGNOSIS — E78.9 LIPID DISORDER: ICD-10-CM

## 2024-08-16 DIAGNOSIS — N80.9 ENDOMETRIOSIS: ICD-10-CM

## 2024-08-16 DIAGNOSIS — F41.9 ANXIETY: ICD-10-CM

## 2024-08-16 DIAGNOSIS — J45.20 MILD INTERMITTENT ASTHMA WITHOUT COMPLICATION: ICD-10-CM

## 2024-08-16 DIAGNOSIS — Z71.89 ENCOUNTER FOR HERB AND VITAMIN SUPPLEMENT MANAGEMENT: ICD-10-CM

## 2024-08-16 DIAGNOSIS — K90.0 CELIAC DISEASE: ICD-10-CM

## 2024-08-16 DIAGNOSIS — E03.8 HYPOTHYROIDISM DUE TO HASHIMOTO'S THYROIDITIS: ICD-10-CM

## 2024-08-16 LAB
25(OH)D3 SERPL-MCNC: 61 NG/ML (ref 30–100)
BASOPHILS ABSOLUTE: 0 THOU/MM3 (ref 0–0.1)
BASOPHILS NFR BLD AUTO: 0.6 %
CALCIUM SERPL-MCNC: 9.4 MG/DL (ref 8.5–10.5)
CHOLEST SERPL-MCNC: 200 MG/DL (ref 100–199)
DEPRECATED MEAN GLUCOSE BLD GHB EST-ACNC: 102 MG/DL (ref 70–126)
DEPRECATED RDW RBC AUTO: 45.2 FL (ref 35–45)
EOSINOPHIL NFR BLD AUTO: 1 %
EOSINOPHILS ABSOLUTE: 0.1 THOU/MM3 (ref 0–0.4)
ERYTHROCYTE [DISTWIDTH] IN BLOOD BY AUTOMATED COUNT: 14.4 % (ref 11.5–14.5)
ERYTHROCYTE [SEDIMENTATION RATE] IN BLOOD BY WESTERGREN METHOD: 11 MM/HR (ref 0–20)
HBA1C MFR BLD HPLC: 5.4 % (ref 4.4–6.4)
HCT VFR BLD AUTO: 37.3 % (ref 37–47)
HDLC SERPL-MCNC: 74 MG/DL
HGB BLD-MCNC: 12 GM/DL (ref 12–16)
IMM GRANULOCYTES # BLD AUTO: 0.01 THOU/MM3 (ref 0–0.07)
IMM GRANULOCYTES NFR BLD AUTO: 0.1 %
LDLC SERPL CALC-MCNC: 111 MG/DL
LYMPHOCYTES ABSOLUTE: 2.2 THOU/MM3 (ref 1–4.8)
LYMPHOCYTES NFR BLD AUTO: 32.3 %
MAGNESIUM SERPL-MCNC: 2.1 MG/DL (ref 1.6–2.4)
MCH RBC QN AUTO: 27.9 PG (ref 26–33)
MCHC RBC AUTO-ENTMCNC: 32.2 GM/DL (ref 32.2–35.5)
MCV RBC AUTO: 86.7 FL (ref 81–99)
MONOCYTES ABSOLUTE: 0.5 THOU/MM3 (ref 0.4–1.3)
MONOCYTES NFR BLD AUTO: 6.9 %
NEUTROPHILS ABSOLUTE: 4 THOU/MM3 (ref 1.8–7.7)
NEUTROPHILS NFR BLD AUTO: 59.1 %
NRBC BLD AUTO-RTO: 0 /100 WBC
PLATELET # BLD AUTO: 255 THOU/MM3 (ref 130–400)
PMV BLD AUTO: 10 FL (ref 9.4–12.4)
PROGEST SERPL-MCNC: 18.41 NG/ML
PTH-INTACT SERPL-MCNC: 33.7 PG/ML (ref 15–65)
RBC # BLD AUTO: 4.3 MILL/MM3 (ref 4.2–5.4)
RHEUMATOID FACT SERPL-ACNC: < 10 IU/ML (ref 0–13)
TRIGL SERPL-MCNC: 75 MG/DL (ref 0–199)
WBC # BLD AUTO: 6.8 THOU/MM3 (ref 4.8–10.8)

## 2024-08-17 LAB
C-REACTIVE PROTEIN, HIGH SENSITIVITY: 3.1 MG/L (ref 0–3)
DHEA-S SERPL-MCNC: 187 UG/DL (ref 35.4–256)
ESTRADIOL LEVEL: 111 PG/ML
FOLLICLE STIMULATING HORMONE: 7.7 MIU/ML
LUTEINIZING HORMONE: 4.4 MIU/ML (ref 1.7–8.6)

## 2024-08-19 LAB
NUCLEAR IGG SER QL IA: NORMAL
TTG IGA SER IA-ACNC: < 0.1 U/ML
TTG IGG SER IA-ACNC: < 0.6 U/ML

## 2024-08-20 LAB — THYROPEROXIDASE AB SERPL IA-ACNC: < 4 IU/ML (ref 0–25)

## 2024-08-21 PROBLEM — Z82.49 FAMILY HISTORY OF CORONARY ARTERY DISEASE: Status: ACTIVE | Noted: 2024-08-21

## 2024-08-21 PROBLEM — I20.0 UNSTABLE ANGINA PECTORIS (HCC): Status: ACTIVE | Noted: 2024-08-21

## 2024-08-21 NOTE — PROGRESS NOTES
[facility]  [address]  [facility] [address]  Dept: [phone]  Dept Fax: [phone]  Loc: [phone]      Irina Wadsworth is a 53 y.o. White female. Irina  presents to the [facility] clinic today for   Chief Complaint   Patient presents with    Discuss Labs   , and;   1. Celiac disease    2. Mild intermittent asthma without complication    3. Encounter for herb and vitamin supplement management    4. Insomnia, unspecified type    5. Lipid disorder    6. Tired    7. Hypothyroidism due to Hashimoto's thyroiditis    8. Anxiety    9. Endometriosis    10. Malignant neoplasm of colon, unspecified part of colon (HCC)          I have reviewed Irina Wadsworth medical, surgical and other pertinent history in detail, and have updated medication and allergy information in the computerized patient record.     Clinical Care Team:     -Referring Provider for today's consult: self  -Primary Care Provider: Dary Nunez DO    Medical/Surgical History:   She  has a past medical history of Celiac disease and Hypothyroidism.  Her  has a past surgical history that includes Upper gastrointestinal endoscopy (November 2013,2021); Colonoscopy (July 2013,2021); Appendectomy (2000); Anus surgery (2000); and other surgical history.    Family/Social History:     Her family history includes Breast Cancer in her maternal aunt; Cervical Cancer in her maternal grandmother; Colon Cancer in her paternal aunt; Colon Polyps in her father; Esophageal Cancer in her maternal grandmother; High Cholesterol in her father; Lung Cancer in her maternal grandmother; Rectal Cancer in her child; Uterine Cancer in her maternal aunt.  She  reports that she has never smoked. She has never used smokeless tobacco. She reports that she does not drink alcohol and does not use drugs.    Medications/Allergies/Immunizations:     Her current

## 2024-08-22 ENCOUNTER — OFFICE VISIT (OUTPATIENT)
Dept: FAMILY MEDICINE CLINIC | Age: 54
End: 2024-08-22
Payer: COMMERCIAL

## 2024-08-22 VITALS
HEART RATE: 59 BPM | BODY MASS INDEX: 23.16 KG/M2 | HEIGHT: 69 IN | OXYGEN SATURATION: 98 % | WEIGHT: 156.4 LBS | TEMPERATURE: 98 F | DIASTOLIC BLOOD PRESSURE: 72 MMHG | SYSTOLIC BLOOD PRESSURE: 118 MMHG | RESPIRATION RATE: 10 BRPM

## 2024-08-22 DIAGNOSIS — E03.8 HYPOTHYROIDISM DUE TO HASHIMOTO'S THYROIDITIS: ICD-10-CM

## 2024-08-22 DIAGNOSIS — N80.9 ENDOMETRIOSIS: ICD-10-CM

## 2024-08-22 DIAGNOSIS — Z71.89 ENCOUNTER FOR HERB AND VITAMIN SUPPLEMENT MANAGEMENT: ICD-10-CM

## 2024-08-22 DIAGNOSIS — F41.9 ANXIETY: ICD-10-CM

## 2024-08-22 DIAGNOSIS — E06.3 HYPOTHYROIDISM DUE TO HASHIMOTO'S THYROIDITIS: ICD-10-CM

## 2024-08-22 DIAGNOSIS — R53.83 TIRED: ICD-10-CM

## 2024-08-22 DIAGNOSIS — J45.20 MILD INTERMITTENT ASTHMA WITHOUT COMPLICATION: ICD-10-CM

## 2024-08-22 DIAGNOSIS — E78.9 LIPID DISORDER: ICD-10-CM

## 2024-08-22 DIAGNOSIS — G47.00 INSOMNIA, UNSPECIFIED TYPE: ICD-10-CM

## 2024-08-22 DIAGNOSIS — K90.0 CELIAC DISEASE: Primary | ICD-10-CM

## 2024-08-22 DIAGNOSIS — C18.9 MALIGNANT NEOPLASM OF COLON, UNSPECIFIED PART OF COLON (HCC): ICD-10-CM

## 2024-08-22 PROCEDURE — 99215 OFFICE O/P EST HI 40 MIN: CPT | Performed by: FAMILY MEDICINE

## 2024-08-22 SDOH — ECONOMIC STABILITY: INCOME INSECURITY: HOW HARD IS IT FOR YOU TO PAY FOR THE VERY BASICS LIKE FOOD, HOUSING, MEDICAL CARE, AND HEATING?: NOT VERY HARD

## 2024-08-22 SDOH — ECONOMIC STABILITY: FOOD INSECURITY: WITHIN THE PAST 12 MONTHS, THE FOOD YOU BOUGHT JUST DIDN'T LAST AND YOU DIDN'T HAVE MONEY TO GET MORE.: NEVER TRUE

## 2024-08-22 SDOH — ECONOMIC STABILITY: FOOD INSECURITY: WITHIN THE PAST 12 MONTHS, YOU WORRIED THAT YOUR FOOD WOULD RUN OUT BEFORE YOU GOT MONEY TO BUY MORE.: NEVER TRUE

## 2024-08-23 LAB
DHEA SERPL-MCNC: 2.49 NG/ML (ref 0.63–4.7)
TESTOSTERONE, FREE W SHGB, FEMALES/CHILDREN: NORMAL

## 2024-09-19 DIAGNOSIS — F33.0 MDD (MAJOR DEPRESSIVE DISORDER), RECURRENT EPISODE, MILD (HCC): ICD-10-CM

## 2024-09-19 DIAGNOSIS — F41.1 GAD (GENERALIZED ANXIETY DISORDER): ICD-10-CM

## 2024-09-19 RX ORDER — PAROXETINE 10 MG/1
5 TABLET, FILM COATED ORAL EVERY MORNING
Qty: 45 TABLET | Refills: 0 | Status: SHIPPED | OUTPATIENT
Start: 2024-09-19

## 2024-09-19 RX ORDER — PAROXETINE 20 MG/1
20 TABLET, FILM COATED ORAL DAILY
Qty: 90 TABLET | Refills: 0 | Status: SHIPPED | OUTPATIENT
Start: 2024-09-19

## 2024-09-27 ENCOUNTER — TELEMEDICINE (OUTPATIENT)
Dept: PSYCHIATRY | Age: 54
End: 2024-09-27

## 2024-09-27 DIAGNOSIS — F41.1 GAD (GENERALIZED ANXIETY DISORDER): ICD-10-CM

## 2024-09-27 DIAGNOSIS — F33.0 MDD (MAJOR DEPRESSIVE DISORDER), RECURRENT EPISODE, MILD (HCC): Primary | ICD-10-CM

## 2024-09-27 RX ORDER — PAROXETINE 20 MG/1
20 TABLET, FILM COATED ORAL DAILY
Qty: 90 TABLET | Refills: 1 | Status: SHIPPED | OUTPATIENT
Start: 2024-09-27

## 2024-09-27 RX ORDER — PAROXETINE 10 MG/1
5 TABLET, FILM COATED ORAL EVERY MORNING
Qty: 45 TABLET | Refills: 1 | Status: SHIPPED | OUTPATIENT
Start: 2024-09-27

## 2024-09-27 ASSESSMENT — ANXIETY QUESTIONNAIRES
1. FEELING NERVOUS, ANXIOUS, OR ON EDGE: SEVERAL DAYS
4. TROUBLE RELAXING: NOT AT ALL
5. BEING SO RESTLESS THAT IT IS HARD TO SIT STILL: NOT AT ALL
GAD7 TOTAL SCORE: 1
7. FEELING AFRAID AS IF SOMETHING AWFUL MIGHT HAPPEN: NOT AT ALL
6. BECOMING EASILY ANNOYED OR IRRITABLE: NOT AT ALL
2. NOT BEING ABLE TO STOP OR CONTROL WORRYING: NOT AT ALL
IF YOU CHECKED OFF ANY PROBLEMS ON THIS QUESTIONNAIRE, HOW DIFFICULT HAVE THESE PROBLEMS MADE IT FOR YOU TO DO YOUR WORK, TAKE CARE OF THINGS AT HOME, OR GET ALONG WITH OTHER PEOPLE: SOMEWHAT DIFFICULT
3. WORRYING TOO MUCH ABOUT DIFFERENT THINGS: NOT AT ALL

## 2024-09-27 ASSESSMENT — PATIENT HEALTH QUESTIONNAIRE - PHQ9
2. FEELING DOWN, DEPRESSED OR HOPELESS: SEVERAL DAYS
7. TROUBLE CONCENTRATING ON THINGS, SUCH AS READING THE NEWSPAPER OR WATCHING TELEVISION: NOT AT ALL
SUM OF ALL RESPONSES TO PHQ9 QUESTIONS 1 & 2: 1
1. LITTLE INTEREST OR PLEASURE IN DOING THINGS: NOT AT ALL
SUM OF ALL RESPONSES TO PHQ QUESTIONS 1-9: 2
8. MOVING OR SPEAKING SO SLOWLY THAT OTHER PEOPLE COULD HAVE NOTICED. OR THE OPPOSITE, BEING SO FIGETY OR RESTLESS THAT YOU HAVE BEEN MOVING AROUND A LOT MORE THAN USUAL: NOT AT ALL
SUM OF ALL RESPONSES TO PHQ QUESTIONS 1-9: 2
10. IF YOU CHECKED OFF ANY PROBLEMS, HOW DIFFICULT HAVE THESE PROBLEMS MADE IT FOR YOU TO DO YOUR WORK, TAKE CARE OF THINGS AT HOME, OR GET ALONG WITH OTHER PEOPLE: SOMEWHAT DIFFICULT
6. FEELING BAD ABOUT YOURSELF - OR THAT YOU ARE A FAILURE OR HAVE LET YOURSELF OR YOUR FAMILY DOWN: NOT AT ALL
3. TROUBLE FALLING OR STAYING ASLEEP: NOT AT ALL
SUM OF ALL RESPONSES TO PHQ QUESTIONS 1-9: 2
4. FEELING TIRED OR HAVING LITTLE ENERGY: SEVERAL DAYS
SUM OF ALL RESPONSES TO PHQ QUESTIONS 1-9: 2
9. THOUGHTS THAT YOU WOULD BE BETTER OFF DEAD, OR OF HURTING YOURSELF: NOT AT ALL
5. POOR APPETITE OR OVEREATING: NOT AT ALL

## 2025-01-08 NOTE — PROGRESS NOTES
[place] [address]  Provider was located at Home (Appt Dept State): [place]  Confirm you are appropriately licensed, registered, or certified to deliver care in the state where the patient is located as indicated above. If you are not or unsure, please re-schedule the visit: Yes, I confirm.        Total time spent for this encounter: Not billed by time    --Irina Aquino, APRN - CNP on 1/9/2025 at 11:40 AM    An electronic signature was used to authenticate this note.    **This report has been created using voice recognition software. It may contain minor errors which are inherent in voice recognition technology.**

## 2025-01-09 ENCOUNTER — TELEMEDICINE (OUTPATIENT)
Dept: PSYCHIATRY | Age: 55
End: 2025-01-09
Payer: COMMERCIAL

## 2025-01-09 DIAGNOSIS — F33.0 MDD (MAJOR DEPRESSIVE DISORDER), RECURRENT EPISODE, MILD (HCC): ICD-10-CM

## 2025-01-09 DIAGNOSIS — F41.1 GAD (GENERALIZED ANXIETY DISORDER): Primary | ICD-10-CM

## 2025-01-09 PROCEDURE — 99214 OFFICE O/P EST MOD 30 MIN: CPT

## 2025-01-09 RX ORDER — PAROXETINE 20 MG/1
20 TABLET, FILM COATED ORAL DAILY
Qty: 90 TABLET | Refills: 1 | Status: SHIPPED | OUTPATIENT
Start: 2025-01-09

## 2025-01-09 RX ORDER — PAROXETINE 10 MG/1
5 TABLET, FILM COATED ORAL EVERY MORNING
Qty: 45 TABLET | Refills: 1 | Status: SHIPPED | OUTPATIENT
Start: 2025-01-09

## 2025-01-09 ASSESSMENT — PATIENT HEALTH QUESTIONNAIRE - PHQ9
1. LITTLE INTEREST OR PLEASURE IN DOING THINGS: NOT AT ALL
SUM OF ALL RESPONSES TO PHQ QUESTIONS 1-9: 0
SUM OF ALL RESPONSES TO PHQ QUESTIONS 1-9: 0
2. FEELING DOWN, DEPRESSED OR HOPELESS: NOT AT ALL
SUM OF ALL RESPONSES TO PHQ QUESTIONS 1-9: 0
1. LITTLE INTEREST OR PLEASURE IN DOING THINGS: NOT AT ALL
2. FEELING DOWN, DEPRESSED OR HOPELESS: NOT AT ALL
SUM OF ALL RESPONSES TO PHQ9 QUESTIONS 1 & 2: 0
SUM OF ALL RESPONSES TO PHQ QUESTIONS 1-9: 0
SUM OF ALL RESPONSES TO PHQ9 QUESTIONS 1 & 2: 0

## 2025-01-09 ASSESSMENT — ANXIETY QUESTIONNAIRES
7. FEELING AFRAID AS IF SOMETHING AWFUL MIGHT HAPPEN: NOT AT ALL
IF YOU CHECKED OFF ANY PROBLEMS ON THIS QUESTIONNAIRE, HOW DIFFICULT HAVE THESE PROBLEMS MADE IT FOR YOU TO DO YOUR WORK, TAKE CARE OF THINGS AT HOME, OR GET ALONG WITH OTHER PEOPLE: SOMEWHAT DIFFICULT
1. FEELING NERVOUS, ANXIOUS, OR ON EDGE: SEVERAL DAYS
6. BECOMING EASILY ANNOYED OR IRRITABLE: SEVERAL DAYS
2. NOT BEING ABLE TO STOP OR CONTROL WORRYING: NOT AT ALL
IF YOU CHECKED OFF ANY PROBLEMS ON THIS QUESTIONNAIRE, HOW DIFFICULT HAVE THESE PROBLEMS MADE IT FOR YOU TO DO YOUR WORK, TAKE CARE OF THINGS AT HOME, OR GET ALONG WITH OTHER PEOPLE: SOMEWHAT DIFFICULT
3. WORRYING TOO MUCH ABOUT DIFFERENT THINGS: NOT AT ALL
3. WORRYING TOO MUCH ABOUT DIFFERENT THINGS: NOT AT ALL
4. TROUBLE RELAXING: NOT AT ALL
6. BECOMING EASILY ANNOYED OR IRRITABLE: SEVERAL DAYS
7. FEELING AFRAID AS IF SOMETHING AWFUL MIGHT HAPPEN: NOT AT ALL
GAD7 TOTAL SCORE: 2
5. BEING SO RESTLESS THAT IT IS HARD TO SIT STILL: NOT AT ALL
2. NOT BEING ABLE TO STOP OR CONTROL WORRYING: NOT AT ALL
4. TROUBLE RELAXING: NOT AT ALL
1. FEELING NERVOUS, ANXIOUS, OR ON EDGE: SEVERAL DAYS
5. BEING SO RESTLESS THAT IT IS HARD TO SIT STILL: NOT AT ALL

## 2025-01-17 ENCOUNTER — LAB (OUTPATIENT)
Age: 55
End: 2025-01-17

## 2025-01-17 ENCOUNTER — LAB (OUTPATIENT)
Dept: LAB | Age: 55
End: 2025-01-17

## 2025-01-30 LAB — CYTOLOGY THIN PREP PAP: NORMAL

## 2025-02-12 ENCOUNTER — LAB (OUTPATIENT)
Dept: LAB | Age: 55
End: 2025-02-12

## 2025-02-12 DIAGNOSIS — Z71.89 ENCOUNTER FOR HERB AND VITAMIN SUPPLEMENT MANAGEMENT: ICD-10-CM

## 2025-02-12 DIAGNOSIS — N80.9 ENDOMETRIOSIS: ICD-10-CM

## 2025-02-12 DIAGNOSIS — E78.9 LIPID DISORDER: ICD-10-CM

## 2025-02-12 DIAGNOSIS — C18.9 MALIGNANT NEOPLASM OF COLON, UNSPECIFIED PART OF COLON (HCC): ICD-10-CM

## 2025-02-12 DIAGNOSIS — J45.20 MILD INTERMITTENT ASTHMA WITHOUT COMPLICATION: ICD-10-CM

## 2025-02-12 DIAGNOSIS — R53.83 TIRED: ICD-10-CM

## 2025-02-12 DIAGNOSIS — G47.00 INSOMNIA, UNSPECIFIED TYPE: ICD-10-CM

## 2025-02-12 DIAGNOSIS — F41.9 ANXIETY: ICD-10-CM

## 2025-02-12 DIAGNOSIS — E06.3 HYPOTHYROIDISM DUE TO HASHIMOTO'S THYROIDITIS: ICD-10-CM

## 2025-02-12 DIAGNOSIS — K90.0 CELIAC DISEASE: ICD-10-CM

## 2025-02-12 LAB
25(OH)D3 SERPL-MCNC: 33 NG/ML (ref 30–100)
BASOPHILS ABSOLUTE: 0 THOU/MM3 (ref 0–0.1)
BASOPHILS NFR BLD AUTO: 0.8 %
CALCIUM SERPL-MCNC: 10 MG/DL (ref 8.5–10.5)
CHOLEST SERPL-MCNC: 233 MG/DL (ref 100–199)
DEPRECATED RDW RBC AUTO: 48 FL (ref 35–45)
EOSINOPHIL NFR BLD AUTO: 1 %
EOSINOPHILS ABSOLUTE: 0 THOU/MM3 (ref 0–0.4)
ERYTHROCYTE [DISTWIDTH] IN BLOOD BY AUTOMATED COUNT: 14.9 % (ref 11.5–14.5)
ERYTHROCYTE [SEDIMENTATION RATE] IN BLOOD BY WESTERGREN METHOD: 6 MM/HR (ref 0–20)
HCT VFR BLD AUTO: 41.3 % (ref 37–47)
HDLC SERPL-MCNC: 83 MG/DL
HGB BLD-MCNC: 13 GM/DL (ref 12–16)
IMM GRANULOCYTES # BLD AUTO: 0.01 THOU/MM3 (ref 0–0.07)
IMM GRANULOCYTES NFR BLD AUTO: 0.2 %
LDLC SERPL CALC-MCNC: 137 MG/DL
LYMPHOCYTES ABSOLUTE: 1.3 THOU/MM3 (ref 1–4.8)
LYMPHOCYTES NFR BLD AUTO: 27.5 %
MAGNESIUM SERPL-MCNC: 2.2 MG/DL (ref 1.6–2.4)
MCH RBC QN AUTO: 27.6 PG (ref 26–33)
MCHC RBC AUTO-ENTMCNC: 31.5 GM/DL (ref 32.2–35.5)
MCV RBC AUTO: 87.7 FL (ref 81–99)
MONOCYTES ABSOLUTE: 0.3 THOU/MM3 (ref 0.4–1.3)
MONOCYTES NFR BLD AUTO: 7 %
NEUTROPHILS ABSOLUTE: 3 THOU/MM3 (ref 1.8–7.7)
NEUTROPHILS NFR BLD AUTO: 63.5 %
NRBC BLD AUTO-RTO: 0 /100 WBC
PLATELET # BLD AUTO: 270 THOU/MM3 (ref 130–400)
PMV BLD AUTO: 10.1 FL (ref 9.4–12.4)
PROGEST SERPL-MCNC: 0.38 NG/ML
PTH-INTACT SERPL-MCNC: 46 PG/ML (ref 15–65)
RBC # BLD AUTO: 4.71 MILL/MM3 (ref 4.2–5.4)
TRIGL SERPL-MCNC: 66 MG/DL (ref 0–199)
WBC # BLD AUTO: 4.8 THOU/MM3 (ref 4.8–10.8)

## 2025-02-13 LAB
C-REACTIVE PROTEIN, HIGH SENSITIVITY: 1.8 MG/L (ref 0–3)
DHEA-S SERPL-MCNC: 157 UG/DL (ref 35.4–256)
ESTRADIOL LEVEL: 150 PG/ML
FOLLICLE STIMULATING HORMONE: 34.7 MIU/ML
LUTEINIZING HORMONE: 45.9 MIU/ML (ref 1.7–8.6)
SHBG SERPL-SCNC: 153 NMOL/L (ref 17–125)
TESTOST FREE MFR SERPL: 1.4 PG/ML (ref 0.6–3.8)
TESTOST SERPL-MCNC: 25 NG/DL (ref 3–41)

## 2025-02-16 LAB — DHEA SERPL-MCNC: 2.73 NG/ML (ref 0.63–4.7)

## 2025-02-18 PROBLEM — H43.12 VITREOUS HEMORRHAGE, LEFT EYE (HCC): Status: ACTIVE | Noted: 2024-08-30

## 2025-02-18 PROBLEM — H43.813 POSTERIOR VITREOUS DETACHMENT OF BOTH EYES: Status: ACTIVE | Noted: 2024-08-30

## 2025-02-18 PROBLEM — H33.22 LEFT RETINAL DETACHMENT: Status: ACTIVE | Noted: 2024-08-30

## 2025-02-18 PROBLEM — R94.39 ABNORMAL CARDIOVASCULAR STRESS TEST: Status: ACTIVE | Noted: 2025-02-18

## 2025-02-19 ENCOUNTER — TELEMEDICINE (OUTPATIENT)
Dept: FAMILY MEDICINE CLINIC | Age: 55
End: 2025-02-19
Payer: COMMERCIAL

## 2025-02-19 DIAGNOSIS — K90.0 CELIAC DISEASE: Primary | ICD-10-CM

## 2025-02-19 DIAGNOSIS — E78.9 LIPID DISORDER: ICD-10-CM

## 2025-02-19 DIAGNOSIS — F41.9 ANXIETY: ICD-10-CM

## 2025-02-19 DIAGNOSIS — G47.00 INSOMNIA, UNSPECIFIED TYPE: ICD-10-CM

## 2025-02-19 DIAGNOSIS — J45.20 MILD INTERMITTENT ASTHMA WITHOUT COMPLICATION: ICD-10-CM

## 2025-02-19 DIAGNOSIS — N80.9 ENDOMETRIOSIS: ICD-10-CM

## 2025-02-19 DIAGNOSIS — E06.3 HYPOTHYROIDISM DUE TO HASHIMOTO'S THYROIDITIS: ICD-10-CM

## 2025-02-19 DIAGNOSIS — C18.9 MALIGNANT NEOPLASM OF COLON, UNSPECIFIED PART OF COLON (HCC): ICD-10-CM

## 2025-02-19 DIAGNOSIS — R53.83 TIRED: ICD-10-CM

## 2025-02-19 DIAGNOSIS — Z71.89 ENCOUNTER FOR HERB AND VITAMIN SUPPLEMENT MANAGEMENT: ICD-10-CM

## 2025-02-19 PROCEDURE — 99214 OFFICE O/P EST MOD 30 MIN: CPT | Performed by: FAMILY MEDICINE

## 2025-02-19 SDOH — ECONOMIC STABILITY: FOOD INSECURITY: WITHIN THE PAST 12 MONTHS, YOU WORRIED THAT YOUR FOOD WOULD RUN OUT BEFORE YOU GOT MONEY TO BUY MORE.: NEVER TRUE

## 2025-02-19 SDOH — ECONOMIC STABILITY: INCOME INSECURITY: IN THE LAST 12 MONTHS, WAS THERE A TIME WHEN YOU WERE NOT ABLE TO PAY THE MORTGAGE OR RENT ON TIME?: NO

## 2025-02-19 SDOH — ECONOMIC STABILITY: FOOD INSECURITY: WITHIN THE PAST 12 MONTHS, THE FOOD YOU BOUGHT JUST DIDN'T LAST AND YOU DIDN'T HAVE MONEY TO GET MORE.: NEVER TRUE

## 2025-02-19 NOTE — PROGRESS NOTES
[facility]  [address]  [facility] [address]  Dept: [phone]  Dept Fax: [phone]  Loc: [phone]      Irina Wadsworth is a 54 y.o. White female. Irina  presents to the [facility] clinic today Irina Wadsworth was evaluated through a real-time audio-video visit. The patient (or guardian) consented to this billable service, including any co-pays. Patient identity was verified, and a caregiver was present if needed. The patient was at home in a state where the provider is licensed, while the provider was in their home office in [place], [place], also licensed to practice for [facility] in [place], [place]. for   Chief Complaint   Patient presents with    Discuss Labs   , and;   1. Celiac disease    2. Mild intermittent asthma without complication    3. Encounter for herb and vitamin supplement management    4. Insomnia, unspecified type    5. Lipid disorder    6. Tired    7. Hypothyroidism due to Hashimoto's thyroiditis    8. Anxiety    9. Endometriosis    10. Malignant neoplasm of colon, unspecified part of colon (HCC)          I have reviewed Irina Wadsworth medical, surgical and other pertinent history in detail, and have updated medication and allergy information in the computerized patient record.     Clinical Care Team:     -Referring Provider for today's consult: self  -Primary Care Provider: Dary Nunez DO    Medical/Surgical History:   She  has a past medical history of Celiac disease and Hypothyroidism.  Her  has a past surgical history that includes Upper gastrointestinal endoscopy (November 2013,2021); Colonoscopy (July 2013,2021); Appendectomy (2000); Anus surgery (2000); and other surgical history.    Family/Social History:     Her family history includes Breast Cancer in her maternal aunt; Cervical Cancer in her maternal grandmother;

## 2025-03-04 ENCOUNTER — LAB (OUTPATIENT)
Age: 55
End: 2025-03-04

## 2025-03-04 LAB
ALBUMIN SERPL BCG-MCNC: 4.3 G/DL (ref 3.4–4.9)
ALP SERPL-CCNC: 55 U/L (ref 35–104)
ALT SERPL W/O P-5'-P-CCNC: 28 U/L (ref 10–35)
ANION GAP SERPL CALC-SCNC: 10 MEQ/L (ref 8–16)
AST SERPL-CCNC: 29 U/L (ref 10–35)
BASOPHILS ABSOLUTE: 0 THOU/MM3 (ref 0–0.1)
BASOPHILS NFR BLD AUTO: 1.1 %
BILIRUB SERPL-MCNC: 0.3 MG/DL (ref 0.3–1.2)
BUN SERPL-MCNC: 16 MG/DL (ref 8–23)
CALCIUM SERPL-MCNC: 9.5 MG/DL (ref 8.6–10)
CHLORIDE SERPL-SCNC: 104 MEQ/L (ref 98–111)
CHOLEST SERPL-MCNC: 230 MG/DL (ref 100–199)
CO2 SERPL-SCNC: 27 MEQ/L (ref 22–29)
CREAT SERPL-MCNC: 0.7 MG/DL (ref 0.5–0.9)
DEPRECATED MEAN GLUCOSE BLD GHB EST-ACNC: 105 MG/DL (ref 70–126)
DEPRECATED RDW RBC AUTO: 47.6 FL (ref 35–45)
EOSINOPHIL NFR BLD AUTO: 1.6 %
EOSINOPHILS ABSOLUTE: 0.1 THOU/MM3 (ref 0–0.4)
ERYTHROCYTE [DISTWIDTH] IN BLOOD BY AUTOMATED COUNT: 15 % (ref 11.5–14.5)
GFR SERPL CREATININE-BSD FRML MDRD: > 90 ML/MIN/1.73M2
GLUCOSE SERPL-MCNC: 89 MG/DL (ref 74–109)
HBA1C MFR BLD HPLC: 5.5 % (ref 4–6)
HCT VFR BLD AUTO: 39.4 % (ref 37–47)
HDLC SERPL-MCNC: 83 MG/DL
HGB BLD-MCNC: 12.8 GM/DL (ref 12–16)
IMM GRANULOCYTES # BLD AUTO: 0.01 THOU/MM3 (ref 0–0.07)
IMM GRANULOCYTES NFR BLD AUTO: 0.3 %
LDLC SERPL CALC-MCNC: 136 MG/DL
LYMPHOCYTES ABSOLUTE: 1.3 THOU/MM3 (ref 1–4.8)
LYMPHOCYTES NFR BLD AUTO: 35.4 %
MCH RBC QN AUTO: 28.2 PG (ref 26–33)
MCHC RBC AUTO-ENTMCNC: 32.5 GM/DL (ref 32.2–35.5)
MCV RBC AUTO: 86.8 FL (ref 81–99)
MONOCYTES ABSOLUTE: 0.3 THOU/MM3 (ref 0.4–1.3)
MONOCYTES NFR BLD AUTO: 8.5 %
NEUTROPHILS ABSOLUTE: 2 THOU/MM3 (ref 1.8–7.7)
NEUTROPHILS NFR BLD AUTO: 53.1 %
NRBC BLD AUTO-RTO: 0 /100 WBC
PLATELET # BLD AUTO: 290 THOU/MM3 (ref 130–400)
PMV BLD AUTO: 10 FL (ref 9.4–12.4)
POTASSIUM SERPL-SCNC: 4.4 MEQ/L (ref 3.5–5.2)
PROT SERPL-MCNC: 6.9 G/DL (ref 6.4–8.3)
RBC # BLD AUTO: 4.54 MILL/MM3 (ref 4.2–5.4)
SODIUM SERPL-SCNC: 141 MEQ/L (ref 135–145)
T4 FREE SERPL-MCNC: 1.1 NG/DL (ref 0.92–1.68)
TRIGL SERPL-MCNC: 53 MG/DL (ref 0–199)
TSH SERPL DL<=0.05 MIU/L-ACNC: 3.31 UIU/ML (ref 0.27–4.2)
WBC # BLD AUTO: 3.8 THOU/MM3 (ref 4.8–10.8)

## 2025-03-05 LAB — T3FREE SERPL-MCNC: 3.33 PG/ML (ref 2–4.4)

## 2025-05-01 ENCOUNTER — PATIENT MESSAGE (OUTPATIENT)
Age: 55
End: 2025-05-01

## 2025-06-10 NOTE — PROGRESS NOTES
and other financial and legal aspects after death of a loved one. She struggles with balancing her own grief with supporting the grief of her children. She endorses worry about \"navigating life without him\". No self-harm thoughts, plan or intent. Patient is requesting to follow up in 1 week.      Therapeutic Intervention: 40 minutes in psychotherapy. CBT, ACT, Interpersonal therapy, and supportive therapy. Focus was on grief, depression and anxiety    Plan:  - Increase Paxil to 40 mg daily for depression and anxiety.  - Start hydroxyzine HCl 10 mg up to three times a day as needed for anxiety and insomnia.  - Continue utilizing nikhil and support system.  - Discuss memories of  with children to help grieve and cope    Follow-up:  Patient is scheduled for a follow up visit on 2025 at 10:45 AM.    Notes & Risk Factors:  - No self-harm thoughts.  - Protective factors: strong nikhil, support from friends, family, Hindu community.      Subjective   History of Present Illness  The patient is a 54-year-old female presenting via virtual visit for evaluation of anxiety and depression.     She is experiencing significant emotional distress following her 's unexpected death last Tuesday. She reports grief \"comes in waves\", with some days more manageable. She finds solace in her nikhil and delivered a therapeutic eulogy at his . She is securing COBRA insurance but has not received paperwork and worries about lack of insurance coverage. She had anxiety attacks after his death, which subsided the past few days. She has occasional intrusive thoughts, feelings of depression, no suicidal ideation. She feels guilt and regret over unresolved marital issues. She reports is struggling to balance her grief with supporting her grieving children. She is concerned about children's reactions to her grief. She reports using an old prescription of Hydroxyzine aids sleep, and she increased Paxil from 20 mg to 40 mg last

## 2025-06-11 ENCOUNTER — TELEMEDICINE (OUTPATIENT)
Dept: PSYCHIATRY | Age: 55
End: 2025-06-11
Payer: COMMERCIAL

## 2025-06-11 DIAGNOSIS — G47.00 INSOMNIA, PERSISTENT: ICD-10-CM

## 2025-06-11 DIAGNOSIS — F33.1 MDD (MAJOR DEPRESSIVE DISORDER), RECURRENT EPISODE, MODERATE (HCC): Primary | ICD-10-CM

## 2025-06-11 DIAGNOSIS — F41.1 GAD (GENERALIZED ANXIETY DISORDER): ICD-10-CM

## 2025-06-11 PROBLEM — F43.20 GRIEF REACTION: Status: ACTIVE | Noted: 2025-06-11

## 2025-06-11 PROCEDURE — 90836 PSYTX W PT W E/M 45 MIN: CPT

## 2025-06-11 PROCEDURE — 99214 OFFICE O/P EST MOD 30 MIN: CPT

## 2025-06-11 RX ORDER — PAROXETINE 40 MG/1
40 TABLET, FILM COATED ORAL DAILY
Qty: 30 TABLET | Refills: 2 | Status: SHIPPED | OUTPATIENT
Start: 2025-06-11

## 2025-06-11 RX ORDER — HYDROXYZINE HYDROCHLORIDE 10 MG/1
10 TABLET, FILM COATED ORAL 3 TIMES DAILY PRN
Qty: 90 TABLET | Refills: 1 | Status: SHIPPED | OUTPATIENT
Start: 2025-06-11

## 2025-06-11 ASSESSMENT — ANXIETY QUESTIONNAIRES
7. FEELING AFRAID AS IF SOMETHING AWFUL MIGHT HAPPEN: NEARLY EVERY DAY
6. BECOMING EASILY ANNOYED OR IRRITABLE: NEARLY EVERY DAY
4. TROUBLE RELAXING: NEARLY EVERY DAY
2. NOT BEING ABLE TO STOP OR CONTROL WORRYING: NEARLY EVERY DAY
4. TROUBLE RELAXING: NEARLY EVERY DAY
5. BEING SO RESTLESS THAT IT IS HARD TO SIT STILL: NEARLY EVERY DAY
IF YOU CHECKED OFF ANY PROBLEMS ON THIS QUESTIONNAIRE, HOW DIFFICULT HAVE THESE PROBLEMS MADE IT FOR YOU TO DO YOUR WORK, TAKE CARE OF THINGS AT HOME, OR GET ALONG WITH OTHER PEOPLE: SOMEWHAT DIFFICULT
3. WORRYING TOO MUCH ABOUT DIFFERENT THINGS: NEARLY EVERY DAY
GAD7 TOTAL SCORE: 21
IF YOU CHECKED OFF ANY PROBLEMS ON THIS QUESTIONNAIRE, HOW DIFFICULT HAVE THESE PROBLEMS MADE IT FOR YOU TO DO YOUR WORK, TAKE CARE OF THINGS AT HOME, OR GET ALONG WITH OTHER PEOPLE: SOMEWHAT DIFFICULT
3. WORRYING TOO MUCH ABOUT DIFFERENT THINGS: NEARLY EVERY DAY
7. FEELING AFRAID AS IF SOMETHING AWFUL MIGHT HAPPEN: NEARLY EVERY DAY
1. FEELING NERVOUS, ANXIOUS, OR ON EDGE: NEARLY EVERY DAY
5. BEING SO RESTLESS THAT IT IS HARD TO SIT STILL: NEARLY EVERY DAY
6. BECOMING EASILY ANNOYED OR IRRITABLE: NEARLY EVERY DAY
1. FEELING NERVOUS, ANXIOUS, OR ON EDGE: NEARLY EVERY DAY
2. NOT BEING ABLE TO STOP OR CONTROL WORRYING: NEARLY EVERY DAY

## 2025-06-11 ASSESSMENT — PATIENT HEALTH QUESTIONNAIRE - PHQ9
4. FEELING TIRED OR HAVING LITTLE ENERGY: MORE THAN HALF THE DAYS
1. LITTLE INTEREST OR PLEASURE IN DOING THINGS: MORE THAN HALF THE DAYS
5. POOR APPETITE OR OVEREATING: MORE THAN HALF THE DAYS
3. TROUBLE FALLING OR STAYING ASLEEP: MORE THAN HALF THE DAYS
9. THOUGHTS THAT YOU WOULD BE BETTER OFF DEAD, OR OF HURTING YOURSELF: NOT AT ALL
2. FEELING DOWN, DEPRESSED OR HOPELESS: MORE THAN HALF THE DAYS
SUM OF ALL RESPONSES TO PHQ QUESTIONS 1-9: 15
SUM OF ALL RESPONSES TO PHQ QUESTIONS 1-9: 15
8. MOVING OR SPEAKING SO SLOWLY THAT OTHER PEOPLE COULD HAVE NOTICED. OR THE OPPOSITE - BEING SO FIDGETY OR RESTLESS THAT YOU HAVE BEEN MOVING AROUND A LOT MORE THAN USUAL: MORE THAN HALF THE DAYS
4. FEELING TIRED OR HAVING LITTLE ENERGY: MORE THAN HALF THE DAYS
10. IF YOU CHECKED OFF ANY PROBLEMS, HOW DIFFICULT HAVE THESE PROBLEMS MADE IT FOR YOU TO DO YOUR WORK, TAKE CARE OF THINGS AT HOME, OR GET ALONG WITH OTHER PEOPLE: SOMEWHAT DIFFICULT
SUM OF ALL RESPONSES TO PHQ QUESTIONS 1-9: 15
1. LITTLE INTEREST OR PLEASURE IN DOING THINGS: MORE THAN HALF THE DAYS
SUM OF ALL RESPONSES TO PHQ QUESTIONS 1-9: 15
9. THOUGHTS THAT YOU WOULD BE BETTER OFF DEAD, OR OF HURTING YOURSELF: NOT AT ALL
6. FEELING BAD ABOUT YOURSELF - OR THAT YOU ARE A FAILURE OR HAVE LET YOURSELF OR YOUR FAMILY DOWN: SEVERAL DAYS
6. FEELING BAD ABOUT YOURSELF - OR THAT YOU ARE A FAILURE OR HAVE LET YOURSELF OR YOUR FAMILY DOWN: SEVERAL DAYS
5. POOR APPETITE OR OVEREATING: MORE THAN HALF THE DAYS
8. MOVING OR SPEAKING SO SLOWLY THAT OTHER PEOPLE COULD HAVE NOTICED. OR THE OPPOSITE, BEING SO FIGETY OR RESTLESS THAT YOU HAVE BEEN MOVING AROUND A LOT MORE THAN USUAL: MORE THAN HALF THE DAYS
7. TROUBLE CONCENTRATING ON THINGS, SUCH AS READING THE NEWSPAPER OR WATCHING TELEVISION: MORE THAN HALF THE DAYS
7. TROUBLE CONCENTRATING ON THINGS, SUCH AS READING THE NEWSPAPER OR WATCHING TELEVISION: MORE THAN HALF THE DAYS
10. IF YOU CHECKED OFF ANY PROBLEMS, HOW DIFFICULT HAVE THESE PROBLEMS MADE IT FOR YOU TO DO YOUR WORK, TAKE CARE OF THINGS AT HOME, OR GET ALONG WITH OTHER PEOPLE: SOMEWHAT DIFFICULT
3. TROUBLE FALLING OR STAYING ASLEEP: MORE THAN HALF THE DAYS
2. FEELING DOWN, DEPRESSED OR HOPELESS: MORE THAN HALF THE DAYS
SUM OF ALL RESPONSES TO PHQ QUESTIONS 1-9: 15

## 2025-06-15 NOTE — PROGRESS NOTES
[facility] [place] [facility]  [facility]  [address] [id number] [phone]    Progress Note    Irina Wadsworth, was evaluated through a synchronous (real-time) audio-video encounter. The patient (or guardian if applicable) is aware that this is a billable service, which includes applicable co-pays. This Virtual Visit was conducted with patient's (and/or legal guardian's) consent. Patient identification was verified, and a caregiver was present when appropriate.   The patient was located at Home: [id number] [place] [address] [place] [address]  Provider was located at Home (Appt Dept State): [place]  Confirm you are appropriately licensed, registered, or certified to deliver care in the state where the patient is located as indicated above. If you are not or unsure, please re-schedule the visit: Yes, I confirm.      Patient:  Irina Wadsworth  YOB: 1970  PCP:  Dary Nunez DO  Visit Date:  6/19/2025      Reason for Visit/CC: \"worry\"  Follow up for medication management for    Diagnosis Orders   1. MDD (major depressive disorder), recurrent episode, moderate (HCC)        2. DONAVON (generalized anxiety disorder)        3. Insomnia, persistent          Assessment & Plan  Clinical Impression: Mental health improving with better sleep, using hydroxyzine 25mg nightly. She reports improved concentration and focus, however fatigues easily with grieving and worry with dealing with the financial and legal aspects after her husbands death. She endorses getting 9 hours sleep and napping 1 hour daily. She endorses a positive response to medication regimen, decreased anxiety levels, no recent anxiety attacks. She endorses finding hydroxyzine 10mg for anxiety once per day and not causing fatigue. Continues to experience moments of sadness and grief, actively engaging in coping strategies and seeking support.    Therapeutic Intervention: 20 minutes in

## 2025-06-19 ENCOUNTER — TELEMEDICINE (OUTPATIENT)
Dept: PSYCHIATRY | Age: 55
End: 2025-06-19

## 2025-06-19 DIAGNOSIS — G47.00 INSOMNIA, PERSISTENT: ICD-10-CM

## 2025-06-19 DIAGNOSIS — F33.1 MDD (MAJOR DEPRESSIVE DISORDER), RECURRENT EPISODE, MODERATE (HCC): Primary | ICD-10-CM

## 2025-06-19 DIAGNOSIS — F41.1 GAD (GENERALIZED ANXIETY DISORDER): ICD-10-CM

## 2025-06-19 PROCEDURE — 99214 OFFICE O/P EST MOD 30 MIN: CPT

## 2025-06-19 PROCEDURE — 90833 PSYTX W PT W E/M 30 MIN: CPT

## 2025-06-19 ASSESSMENT — PATIENT HEALTH QUESTIONNAIRE - PHQ9
3. TROUBLE FALLING OR STAYING ASLEEP: SEVERAL DAYS
SUM OF ALL RESPONSES TO PHQ QUESTIONS 1-9: 9
9. THOUGHTS THAT YOU WOULD BE BETTER OFF DEAD, OR OF HURTING YOURSELF: NOT AT ALL
8. MOVING OR SPEAKING SO SLOWLY THAT OTHER PEOPLE COULD HAVE NOTICED. OR THE OPPOSITE - BEING SO FIDGETY OR RESTLESS THAT YOU HAVE BEEN MOVING AROUND A LOT MORE THAN USUAL: NOT AT ALL
SUM OF ALL RESPONSES TO PHQ QUESTIONS 1-9: 9
SUM OF ALL RESPONSES TO PHQ QUESTIONS 1-9: 9
5. POOR APPETITE OR OVEREATING: SEVERAL DAYS
4. FEELING TIRED OR HAVING LITTLE ENERGY: SEVERAL DAYS
2. FEELING DOWN, DEPRESSED OR HOPELESS: MORE THAN HALF THE DAYS
5. POOR APPETITE OR OVEREATING: SEVERAL DAYS
SUM OF ALL RESPONSES TO PHQ QUESTIONS 1-9: 9
10. IF YOU CHECKED OFF ANY PROBLEMS, HOW DIFFICULT HAVE THESE PROBLEMS MADE IT FOR YOU TO DO YOUR WORK, TAKE CARE OF THINGS AT HOME, OR GET ALONG WITH OTHER PEOPLE: SOMEWHAT DIFFICULT
1. LITTLE INTEREST OR PLEASURE IN DOING THINGS: MORE THAN HALF THE DAYS
6. FEELING BAD ABOUT YOURSELF - OR THAT YOU ARE A FAILURE OR HAVE LET YOURSELF OR YOUR FAMILY DOWN: SEVERAL DAYS
7. TROUBLE CONCENTRATING ON THINGS, SUCH AS READING THE NEWSPAPER OR WATCHING TELEVISION: SEVERAL DAYS
9. THOUGHTS THAT YOU WOULD BE BETTER OFF DEAD, OR OF HURTING YOURSELF: NOT AT ALL
4. FEELING TIRED OR HAVING LITTLE ENERGY: SEVERAL DAYS
3. TROUBLE FALLING OR STAYING ASLEEP: SEVERAL DAYS
7. TROUBLE CONCENTRATING ON THINGS, SUCH AS READING THE NEWSPAPER OR WATCHING TELEVISION: SEVERAL DAYS
8. MOVING OR SPEAKING SO SLOWLY THAT OTHER PEOPLE COULD HAVE NOTICED. OR THE OPPOSITE, BEING SO FIGETY OR RESTLESS THAT YOU HAVE BEEN MOVING AROUND A LOT MORE THAN USUAL: NOT AT ALL
SUM OF ALL RESPONSES TO PHQ QUESTIONS 1-9: 9
10. IF YOU CHECKED OFF ANY PROBLEMS, HOW DIFFICULT HAVE THESE PROBLEMS MADE IT FOR YOU TO DO YOUR WORK, TAKE CARE OF THINGS AT HOME, OR GET ALONG WITH OTHER PEOPLE: SOMEWHAT DIFFICULT
6. FEELING BAD ABOUT YOURSELF - OR THAT YOU ARE A FAILURE OR HAVE LET YOURSELF OR YOUR FAMILY DOWN: SEVERAL DAYS
2. FEELING DOWN, DEPRESSED OR HOPELESS: MORE THAN HALF THE DAYS
1. LITTLE INTEREST OR PLEASURE IN DOING THINGS: MORE THAN HALF THE DAYS

## 2025-06-19 ASSESSMENT — ANXIETY QUESTIONNAIRES
2. NOT BEING ABLE TO STOP OR CONTROL WORRYING: MORE THAN HALF THE DAYS
1. FEELING NERVOUS, ANXIOUS, OR ON EDGE: MORE THAN HALF THE DAYS
IF YOU CHECKED OFF ANY PROBLEMS ON THIS QUESTIONNAIRE, HOW DIFFICULT HAVE THESE PROBLEMS MADE IT FOR YOU TO DO YOUR WORK, TAKE CARE OF THINGS AT HOME, OR GET ALONG WITH OTHER PEOPLE: SOMEWHAT DIFFICULT
6. BECOMING EASILY ANNOYED OR IRRITABLE: SEVERAL DAYS
4. TROUBLE RELAXING: SEVERAL DAYS
7. FEELING AFRAID AS IF SOMETHING AWFUL MIGHT HAPPEN: SEVERAL DAYS
4. TROUBLE RELAXING: SEVERAL DAYS
3. WORRYING TOO MUCH ABOUT DIFFERENT THINGS: SEVERAL DAYS
1. FEELING NERVOUS, ANXIOUS, OR ON EDGE: MORE THAN HALF THE DAYS
IF YOU CHECKED OFF ANY PROBLEMS ON THIS QUESTIONNAIRE, HOW DIFFICULT HAVE THESE PROBLEMS MADE IT FOR YOU TO DO YOUR WORK, TAKE CARE OF THINGS AT HOME, OR GET ALONG WITH OTHER PEOPLE: SOMEWHAT DIFFICULT
5. BEING SO RESTLESS THAT IT IS HARD TO SIT STILL: SEVERAL DAYS
5. BEING SO RESTLESS THAT IT IS HARD TO SIT STILL: SEVERAL DAYS
6. BECOMING EASILY ANNOYED OR IRRITABLE: SEVERAL DAYS
3. WORRYING TOO MUCH ABOUT DIFFERENT THINGS: SEVERAL DAYS
2. NOT BEING ABLE TO STOP OR CONTROL WORRYING: MORE THAN HALF THE DAYS
GAD7 TOTAL SCORE: 9
7. FEELING AFRAID AS IF SOMETHING AWFUL MIGHT HAPPEN: SEVERAL DAYS

## 2025-07-04 DIAGNOSIS — G47.00 INSOMNIA, PERSISTENT: ICD-10-CM

## 2025-07-04 DIAGNOSIS — F33.1 MDD (MAJOR DEPRESSIVE DISORDER), RECURRENT EPISODE, MODERATE (HCC): ICD-10-CM

## 2025-07-04 DIAGNOSIS — F41.1 GAD (GENERALIZED ANXIETY DISORDER): ICD-10-CM

## 2025-07-07 RX ORDER — HYDROXYZINE HYDROCHLORIDE 10 MG/1
10 TABLET, FILM COATED ORAL 3 TIMES DAILY PRN
Qty: 270 TABLET | Refills: 1 | OUTPATIENT
Start: 2025-07-07

## 2025-07-07 RX ORDER — PAROXETINE 40 MG/1
40 TABLET, FILM COATED ORAL DAILY
Qty: 90 TABLET | Refills: 1 | OUTPATIENT
Start: 2025-07-07

## 2025-07-14 NOTE — PROGRESS NOTES
[facility] [place] [facility]  [facility]  [address] [facility] [address]  [phone]    Progress Note    Irina Wadsworth, was evaluated through a synchronous (real-time) audio-video encounter. The patient (or guardian if applicable) is aware that this is a billable service, which includes applicable co-pays. This Virtual Visit was conducted with patient's (and/or legal guardian's) consent. Patient identification was verified, and a caregiver was present when appropriate.   The patient was located at Home: [id number] [place] [address] [place] [address]  Provider was located at Home (Appt Dept State): [place]  Confirm you are appropriately licensed, registered, or certified to deliver care in the state where the patient is located as indicated above. If you are not or unsure, please re-schedule the visit: Yes, I confirm.      Patient:  Irina Wadsworth  YOB: 1970  PCP:  Dary Nunez DO  Visit Date:  7/17/2025      Reason for Visit/CC: \"had a bad day\"  Follow up for medication management for    Diagnosis Orders   1. MDD (major depressive disorder), recurrent episode, moderate (HCC)  PARoxetine (PAXIL) 40 MG tablet      2. DONAVON (generalized anxiety disorder)  PARoxetine (PAXIL) 40 MG tablet    hydrOXYzine HCl (ATARAX) 10 MG tablet      3. Insomnia, persistent  PARoxetine (PAXIL) 40 MG tablet    hydrOXYzine HCl (ATARAX) 10 MG tablet        Assessment & Plan  Clinical Impression: Ongoing grief with daily crying, mostly when talking with friends, and one day recently with intense despair, managing with support from family, friends, and Scientologist. However she endorses Paxil effective for anxiety and depression, hydroxyzine 10 mg at night helps sleep without next day grogginess, not using/needing for anxiety during the daytime. Strong support system, actively seeking help. Discussed medication management with patient. Patient agreed with plan to continue

## 2025-07-17 ENCOUNTER — TELEMEDICINE (OUTPATIENT)
Dept: PSYCHIATRY | Age: 55
End: 2025-07-17
Payer: COMMERCIAL

## 2025-07-17 DIAGNOSIS — G47.00 INSOMNIA, PERSISTENT: ICD-10-CM

## 2025-07-17 DIAGNOSIS — F41.1 GAD (GENERALIZED ANXIETY DISORDER): ICD-10-CM

## 2025-07-17 DIAGNOSIS — F33.1 MDD (MAJOR DEPRESSIVE DISORDER), RECURRENT EPISODE, MODERATE (HCC): Primary | ICD-10-CM

## 2025-07-17 PROCEDURE — 99214 OFFICE O/P EST MOD 30 MIN: CPT

## 2025-07-17 PROCEDURE — 90833 PSYTX W PT W E/M 30 MIN: CPT

## 2025-07-17 RX ORDER — HYDROXYZINE HYDROCHLORIDE 10 MG/1
10 TABLET, FILM COATED ORAL 3 TIMES DAILY PRN
Qty: 90 TABLET | Refills: 1 | Status: SHIPPED | OUTPATIENT
Start: 2025-07-17

## 2025-07-17 RX ORDER — PAROXETINE 40 MG/1
40 TABLET, FILM COATED ORAL DAILY
Qty: 30 TABLET | Refills: 2 | Status: SHIPPED | OUTPATIENT
Start: 2025-07-17

## 2025-07-17 ASSESSMENT — PATIENT HEALTH QUESTIONNAIRE - PHQ9
6. FEELING BAD ABOUT YOURSELF - OR THAT YOU ARE A FAILURE OR HAVE LET YOURSELF OR YOUR FAMILY DOWN: NOT AT ALL
9. THOUGHTS THAT YOU WOULD BE BETTER OFF DEAD, OR OF HURTING YOURSELF: NOT AT ALL
6. FEELING BAD ABOUT YOURSELF - OR THAT YOU ARE A FAILURE OR HAVE LET YOURSELF OR YOUR FAMILY DOWN: NOT AT ALL
4. FEELING TIRED OR HAVING LITTLE ENERGY: SEVERAL DAYS
7. TROUBLE CONCENTRATING ON THINGS, SUCH AS READING THE NEWSPAPER OR WATCHING TELEVISION: SEVERAL DAYS
1. LITTLE INTEREST OR PLEASURE IN DOING THINGS: SEVERAL DAYS
3. TROUBLE FALLING OR STAYING ASLEEP: SEVERAL DAYS
SUM OF ALL RESPONSES TO PHQ QUESTIONS 1-9: 6
10. IF YOU CHECKED OFF ANY PROBLEMS, HOW DIFFICULT HAVE THESE PROBLEMS MADE IT FOR YOU TO DO YOUR WORK, TAKE CARE OF THINGS AT HOME, OR GET ALONG WITH OTHER PEOPLE: SOMEWHAT DIFFICULT
3. TROUBLE FALLING OR STAYING ASLEEP: SEVERAL DAYS
SUM OF ALL RESPONSES TO PHQ QUESTIONS 1-9: 6
10. IF YOU CHECKED OFF ANY PROBLEMS, HOW DIFFICULT HAVE THESE PROBLEMS MADE IT FOR YOU TO DO YOUR WORK, TAKE CARE OF THINGS AT HOME, OR GET ALONG WITH OTHER PEOPLE: SOMEWHAT DIFFICULT
8. MOVING OR SPEAKING SO SLOWLY THAT OTHER PEOPLE COULD HAVE NOTICED. OR THE OPPOSITE, BEING SO FIGETY OR RESTLESS THAT YOU HAVE BEEN MOVING AROUND A LOT MORE THAN USUAL: NOT AT ALL
8. MOVING OR SPEAKING SO SLOWLY THAT OTHER PEOPLE COULD HAVE NOTICED. OR THE OPPOSITE - BEING SO FIDGETY OR RESTLESS THAT YOU HAVE BEEN MOVING AROUND A LOT MORE THAN USUAL: NOT AT ALL
5. POOR APPETITE OR OVEREATING: SEVERAL DAYS
7. TROUBLE CONCENTRATING ON THINGS, SUCH AS READING THE NEWSPAPER OR WATCHING TELEVISION: SEVERAL DAYS
SUM OF ALL RESPONSES TO PHQ QUESTIONS 1-9: 6
4. FEELING TIRED OR HAVING LITTLE ENERGY: SEVERAL DAYS
5. POOR APPETITE OR OVEREATING: SEVERAL DAYS
2. FEELING DOWN, DEPRESSED OR HOPELESS: SEVERAL DAYS
9. THOUGHTS THAT YOU WOULD BE BETTER OFF DEAD, OR OF HURTING YOURSELF: NOT AT ALL
1. LITTLE INTEREST OR PLEASURE IN DOING THINGS: SEVERAL DAYS
SUM OF ALL RESPONSES TO PHQ QUESTIONS 1-9: 6
2. FEELING DOWN, DEPRESSED OR HOPELESS: SEVERAL DAYS
SUM OF ALL RESPONSES TO PHQ QUESTIONS 1-9: 6

## 2025-07-17 ASSESSMENT — ANXIETY QUESTIONNAIRES
6. BECOMING EASILY ANNOYED OR IRRITABLE: MORE THAN HALF THE DAYS
5. BEING SO RESTLESS THAT IT IS HARD TO SIT STILL: NOT AT ALL
1. FEELING NERVOUS, ANXIOUS, OR ON EDGE: MORE THAN HALF THE DAYS
GAD7 TOTAL SCORE: 8
7. FEELING AFRAID AS IF SOMETHING AWFUL MIGHT HAPPEN: SEVERAL DAYS
3. WORRYING TOO MUCH ABOUT DIFFERENT THINGS: SEVERAL DAYS
4. TROUBLE RELAXING: SEVERAL DAYS
2. NOT BEING ABLE TO STOP OR CONTROL WORRYING: SEVERAL DAYS
2. NOT BEING ABLE TO STOP OR CONTROL WORRYING: SEVERAL DAYS
IF YOU CHECKED OFF ANY PROBLEMS ON THIS QUESTIONNAIRE, HOW DIFFICULT HAVE THESE PROBLEMS MADE IT FOR YOU TO DO YOUR WORK, TAKE CARE OF THINGS AT HOME, OR GET ALONG WITH OTHER PEOPLE: SOMEWHAT DIFFICULT
5. BEING SO RESTLESS THAT IT IS HARD TO SIT STILL: NOT AT ALL
7. FEELING AFRAID AS IF SOMETHING AWFUL MIGHT HAPPEN: SEVERAL DAYS
IF YOU CHECKED OFF ANY PROBLEMS ON THIS QUESTIONNAIRE, HOW DIFFICULT HAVE THESE PROBLEMS MADE IT FOR YOU TO DO YOUR WORK, TAKE CARE OF THINGS AT HOME, OR GET ALONG WITH OTHER PEOPLE: SOMEWHAT DIFFICULT
1. FEELING NERVOUS, ANXIOUS, OR ON EDGE: MORE THAN HALF THE DAYS
3. WORRYING TOO MUCH ABOUT DIFFERENT THINGS: SEVERAL DAYS
6. BECOMING EASILY ANNOYED OR IRRITABLE: MORE THAN HALF THE DAYS
4. TROUBLE RELAXING: SEVERAL DAYS

## 2025-08-21 ENCOUNTER — TELEMEDICINE (OUTPATIENT)
Dept: PSYCHIATRY | Age: 55
End: 2025-08-21
Payer: COMMERCIAL

## 2025-08-21 DIAGNOSIS — F41.1 GAD (GENERALIZED ANXIETY DISORDER): ICD-10-CM

## 2025-08-21 DIAGNOSIS — G47.00 INSOMNIA, PERSISTENT: ICD-10-CM

## 2025-08-21 DIAGNOSIS — F33.1 MDD (MAJOR DEPRESSIVE DISORDER), RECURRENT EPISODE, MODERATE (HCC): Primary | ICD-10-CM

## 2025-08-21 PROCEDURE — 99214 OFFICE O/P EST MOD 30 MIN: CPT

## 2025-08-21 PROCEDURE — 90833 PSYTX W PT W E/M 30 MIN: CPT

## 2025-08-21 RX ORDER — PAROXETINE 40 MG/1
40 TABLET, FILM COATED ORAL DAILY
Qty: 90 TABLET | Refills: 0 | Status: SHIPPED | OUTPATIENT
Start: 2025-08-21

## 2025-08-21 RX ORDER — HYDROXYZINE HYDROCHLORIDE 10 MG/1
10 TABLET, FILM COATED ORAL 3 TIMES DAILY PRN
Qty: 90 TABLET | Refills: 1 | Status: SHIPPED | OUTPATIENT
Start: 2025-08-21

## 2025-08-21 ASSESSMENT — PATIENT HEALTH QUESTIONNAIRE - PHQ9
4. FEELING TIRED OR HAVING LITTLE ENERGY: SEVERAL DAYS
SUM OF ALL RESPONSES TO PHQ QUESTIONS 1-9: 4
2. FEELING DOWN, DEPRESSED OR HOPELESS: SEVERAL DAYS
10. IF YOU CHECKED OFF ANY PROBLEMS, HOW DIFFICULT HAVE THESE PROBLEMS MADE IT FOR YOU TO DO YOUR WORK, TAKE CARE OF THINGS AT HOME, OR GET ALONG WITH OTHER PEOPLE: SOMEWHAT DIFFICULT
9. THOUGHTS THAT YOU WOULD BE BETTER OFF DEAD, OR OF HURTING YOURSELF: NOT AT ALL
8. MOVING OR SPEAKING SO SLOWLY THAT OTHER PEOPLE COULD HAVE NOTICED. OR THE OPPOSITE, BEING SO FIGETY OR RESTLESS THAT YOU HAVE BEEN MOVING AROUND A LOT MORE THAN USUAL: NOT AT ALL
SUM OF ALL RESPONSES TO PHQ QUESTIONS 1-9: 4
2. FEELING DOWN, DEPRESSED OR HOPELESS: SEVERAL DAYS
3. TROUBLE FALLING OR STAYING ASLEEP: SEVERAL DAYS
7. TROUBLE CONCENTRATING ON THINGS, SUCH AS READING THE NEWSPAPER OR WATCHING TELEVISION: NOT AT ALL
5. POOR APPETITE OR OVEREATING: NOT AT ALL
5. POOR APPETITE OR OVEREATING: NOT AT ALL
9. THOUGHTS THAT YOU WOULD BE BETTER OFF DEAD, OR OF HURTING YOURSELF: NOT AT ALL
SUM OF ALL RESPONSES TO PHQ QUESTIONS 1-9: 4
8. MOVING OR SPEAKING SO SLOWLY THAT OTHER PEOPLE COULD HAVE NOTICED. OR THE OPPOSITE - BEING SO FIDGETY OR RESTLESS THAT YOU HAVE BEEN MOVING AROUND A LOT MORE THAN USUAL: NOT AT ALL
3. TROUBLE FALLING OR STAYING ASLEEP: SEVERAL DAYS
7. TROUBLE CONCENTRATING ON THINGS, SUCH AS READING THE NEWSPAPER OR WATCHING TELEVISION: NOT AT ALL
6. FEELING BAD ABOUT YOURSELF - OR THAT YOU ARE A FAILURE OR HAVE LET YOURSELF OR YOUR FAMILY DOWN: NOT AT ALL
SUM OF ALL RESPONSES TO PHQ QUESTIONS 1-9: 4
SUM OF ALL RESPONSES TO PHQ QUESTIONS 1-9: 4
6. FEELING BAD ABOUT YOURSELF - OR THAT YOU ARE A FAILURE OR HAVE LET YOURSELF OR YOUR FAMILY DOWN: NOT AT ALL
1. LITTLE INTEREST OR PLEASURE IN DOING THINGS: SEVERAL DAYS
10. IF YOU CHECKED OFF ANY PROBLEMS, HOW DIFFICULT HAVE THESE PROBLEMS MADE IT FOR YOU TO DO YOUR WORK, TAKE CARE OF THINGS AT HOME, OR GET ALONG WITH OTHER PEOPLE: SOMEWHAT DIFFICULT
4. FEELING TIRED OR HAVING LITTLE ENERGY: SEVERAL DAYS
1. LITTLE INTEREST OR PLEASURE IN DOING THINGS: SEVERAL DAYS

## 2025-08-21 ASSESSMENT — ANXIETY QUESTIONNAIRES
3. WORRYING TOO MUCH ABOUT DIFFERENT THINGS: NOT AT ALL
1. FEELING NERVOUS, ANXIOUS, OR ON EDGE: SEVERAL DAYS
IF YOU CHECKED OFF ANY PROBLEMS ON THIS QUESTIONNAIRE, HOW DIFFICULT HAVE THESE PROBLEMS MADE IT FOR YOU TO DO YOUR WORK, TAKE CARE OF THINGS AT HOME, OR GET ALONG WITH OTHER PEOPLE: SOMEWHAT DIFFICULT
4. TROUBLE RELAXING: SEVERAL DAYS
GAD7 TOTAL SCORE: 3
5. BEING SO RESTLESS THAT IT IS HARD TO SIT STILL: NOT AT ALL
6. BECOMING EASILY ANNOYED OR IRRITABLE: SEVERAL DAYS
6. BECOMING EASILY ANNOYED OR IRRITABLE: SEVERAL DAYS
IF YOU CHECKED OFF ANY PROBLEMS ON THIS QUESTIONNAIRE, HOW DIFFICULT HAVE THESE PROBLEMS MADE IT FOR YOU TO DO YOUR WORK, TAKE CARE OF THINGS AT HOME, OR GET ALONG WITH OTHER PEOPLE: SOMEWHAT DIFFICULT
5. BEING SO RESTLESS THAT IT IS HARD TO SIT STILL: NOT AT ALL
7. FEELING AFRAID AS IF SOMETHING AWFUL MIGHT HAPPEN: NOT AT ALL
3. WORRYING TOO MUCH ABOUT DIFFERENT THINGS: NOT AT ALL
1. FEELING NERVOUS, ANXIOUS, OR ON EDGE: SEVERAL DAYS
4. TROUBLE RELAXING: SEVERAL DAYS
2. NOT BEING ABLE TO STOP OR CONTROL WORRYING: NOT AT ALL
2. NOT BEING ABLE TO STOP OR CONTROL WORRYING: NOT AT ALL
7. FEELING AFRAID AS IF SOMETHING AWFUL MIGHT HAPPEN: NOT AT ALL